# Patient Record
Sex: MALE | Race: WHITE | Employment: FULL TIME | URBAN - METROPOLITAN AREA
[De-identification: names, ages, dates, MRNs, and addresses within clinical notes are randomized per-mention and may not be internally consistent; named-entity substitution may affect disease eponyms.]

---

## 2017-01-03 ENCOUNTER — PATIENT MESSAGE (OUTPATIENT)
Dept: FAMILY MEDICINE CLINIC | Age: 52
End: 2017-01-03

## 2017-01-13 RX ORDER — LISINOPRIL 10 MG/1
TABLET ORAL
Qty: 30 TABLET | Refills: 5 | Status: SHIPPED | OUTPATIENT
Start: 2017-01-13 | End: 2017-02-13

## 2017-01-26 ENCOUNTER — PATIENT MESSAGE (OUTPATIENT)
Dept: FAMILY MEDICINE CLINIC | Age: 52
End: 2017-01-26

## 2017-02-13 ENCOUNTER — OFFICE VISIT (OUTPATIENT)
Dept: FAMILY MEDICINE CLINIC | Age: 52
End: 2017-02-13

## 2017-02-13 VITALS
BODY MASS INDEX: 33.28 KG/M2 | OXYGEN SATURATION: 98 % | RESPIRATION RATE: 16 BRPM | DIASTOLIC BLOOD PRESSURE: 80 MMHG | HEART RATE: 87 BPM | SYSTOLIC BLOOD PRESSURE: 124 MMHG | WEIGHT: 238.6 LBS

## 2017-02-13 DIAGNOSIS — T46.4X5A COUGH DUE TO ACE INHIBITOR: ICD-10-CM

## 2017-02-13 DIAGNOSIS — E11.9 TYPE 2 DIABETES MELLITUS WITHOUT COMPLICATION, WITHOUT LONG-TERM CURRENT USE OF INSULIN (HCC): Primary | ICD-10-CM

## 2017-02-13 DIAGNOSIS — R05.8 COUGH DUE TO ACE INHIBITOR: ICD-10-CM

## 2017-02-13 DIAGNOSIS — E78.1 HYPERTRIGLYCERIDEMIA: ICD-10-CM

## 2017-02-13 DIAGNOSIS — E66.9 OBESITY (BMI 30-39.9): ICD-10-CM

## 2017-02-13 LAB
CREATININE URINE POCT: 50
HBA1C MFR BLD: 9 %
MICROALBUMIN/CREAT 24H UR: 10 MG/G{CREAT}

## 2017-02-13 PROCEDURE — 82044 UR ALBUMIN SEMIQUANTITATIVE: CPT | Performed by: FAMILY MEDICINE

## 2017-02-13 PROCEDURE — 99214 OFFICE O/P EST MOD 30 MIN: CPT | Performed by: FAMILY MEDICINE

## 2017-02-13 PROCEDURE — 83036 HEMOGLOBIN GLYCOSYLATED A1C: CPT | Performed by: FAMILY MEDICINE

## 2017-02-13 RX ORDER — LOSARTAN POTASSIUM 25 MG/1
25 TABLET ORAL DAILY
Qty: 30 TABLET | Refills: 5 | Status: SHIPPED | OUTPATIENT
Start: 2017-02-13 | End: 2017-08-26 | Stop reason: SDUPTHER

## 2017-03-07 ENCOUNTER — TELEPHONE (OUTPATIENT)
Dept: FAMILY MEDICINE CLINIC | Age: 52
End: 2017-03-07

## 2017-04-21 ENCOUNTER — PATIENT MESSAGE (OUTPATIENT)
Dept: FAMILY MEDICINE CLINIC | Age: 52
End: 2017-04-21

## 2017-05-12 ENCOUNTER — OFFICE VISIT (OUTPATIENT)
Dept: FAMILY MEDICINE CLINIC | Age: 52
End: 2017-05-12

## 2017-05-12 VITALS
RESPIRATION RATE: 15 BRPM | WEIGHT: 240.6 LBS | BODY MASS INDEX: 33.68 KG/M2 | HEIGHT: 71 IN | HEART RATE: 71 BPM | DIASTOLIC BLOOD PRESSURE: 82 MMHG | SYSTOLIC BLOOD PRESSURE: 128 MMHG | OXYGEN SATURATION: 96 %

## 2017-05-12 DIAGNOSIS — E11.9 TYPE 2 DIABETES MELLITUS WITHOUT COMPLICATION, WITHOUT LONG-TERM CURRENT USE OF INSULIN (HCC): Primary | ICD-10-CM

## 2017-05-12 DIAGNOSIS — E66.9 OBESITY (BMI 30-39.9): ICD-10-CM

## 2017-05-12 DIAGNOSIS — E78.1 HYPERTRIGLYCERIDEMIA: ICD-10-CM

## 2017-05-12 DIAGNOSIS — I15.2 HYPERTENSION DUE TO ENDOCRINE DISORDER: ICD-10-CM

## 2017-05-12 LAB — HBA1C MFR BLD: 7.3 %

## 2017-05-12 PROCEDURE — 83036 HEMOGLOBIN GLYCOSYLATED A1C: CPT | Performed by: FAMILY MEDICINE

## 2017-05-12 PROCEDURE — 99214 OFFICE O/P EST MOD 30 MIN: CPT | Performed by: FAMILY MEDICINE

## 2017-05-12 RX ORDER — GLIPIZIDE 5 MG/1
5 TABLET, FILM COATED, EXTENDED RELEASE ORAL DAILY
Qty: 30 TABLET | Refills: 5 | Status: SHIPPED | OUTPATIENT
Start: 2017-05-12 | End: 2017-10-19 | Stop reason: SDUPTHER

## 2017-07-07 ENCOUNTER — PATIENT MESSAGE (OUTPATIENT)
Dept: FAMILY MEDICINE CLINIC | Age: 52
End: 2017-07-07

## 2017-07-07 DIAGNOSIS — E11.9 TYPE 2 DIABETES MELLITUS WITHOUT COMPLICATION, WITHOUT LONG-TERM CURRENT USE OF INSULIN (HCC): ICD-10-CM

## 2017-08-26 DIAGNOSIS — E11.9 TYPE 2 DIABETES MELLITUS WITHOUT COMPLICATION, WITHOUT LONG-TERM CURRENT USE OF INSULIN (HCC): ICD-10-CM

## 2017-08-26 RX ORDER — LOSARTAN POTASSIUM 25 MG/1
TABLET ORAL
Qty: 30 TABLET | Refills: 4 | Status: SHIPPED | OUTPATIENT
Start: 2017-08-26 | End: 2018-02-01 | Stop reason: SDUPTHER

## 2017-10-19 ENCOUNTER — OFFICE VISIT (OUTPATIENT)
Dept: FAMILY MEDICINE CLINIC | Age: 52
End: 2017-10-19

## 2017-10-19 ENCOUNTER — HOSPITAL ENCOUNTER (OUTPATIENT)
Dept: OTHER | Age: 52
Discharge: HOME | End: 2017-10-19
Attending: FAMILY MEDICINE | Admitting: FAMILY MEDICINE

## 2017-10-19 VITALS
RESPIRATION RATE: 14 BRPM | BODY MASS INDEX: 33.64 KG/M2 | OXYGEN SATURATION: 97 % | WEIGHT: 241.2 LBS | DIASTOLIC BLOOD PRESSURE: 78 MMHG | HEART RATE: 95 BPM | SYSTOLIC BLOOD PRESSURE: 128 MMHG

## 2017-10-19 DIAGNOSIS — E11.9 TYPE 2 DIABETES MELLITUS WITHOUT COMPLICATION, WITHOUT LONG-TERM CURRENT USE OF INSULIN (HCC): Primary | ICD-10-CM

## 2017-10-19 DIAGNOSIS — E11.9 TYPE 2 DIABETES MELLITUS WITHOUT COMPLICATION, WITHOUT LONG-TERM CURRENT USE OF INSULIN (HCC): ICD-10-CM

## 2017-10-19 DIAGNOSIS — I15.2 HYPERTENSION DUE TO ENDOCRINE DISORDER: ICD-10-CM

## 2017-10-19 DIAGNOSIS — E78.1 HYPERTRIGLYCERIDEMIA: ICD-10-CM

## 2017-10-19 DIAGNOSIS — E66.9 OBESITY (BMI 30-39.9): ICD-10-CM

## 2017-10-19 LAB
A/G RATIO: 1.7 (ref 1.1–2.2)
ALBUMIN SERPL-MCNC: 4.4 G/DL (ref 3.4–5)
ALP BLD-CCNC: 86 U/L (ref 40–129)
ALT SERPL-CCNC: 46 U/L (ref 10–40)
ANION GAP SERPL CALCULATED.3IONS-SCNC: 14 MMOL/L (ref 3–16)
AST SERPL-CCNC: 26 U/L (ref 15–37)
BASOPHILS ABSOLUTE: 0.1 K/UL (ref 0–0.2)
BASOPHILS RELATIVE PERCENT: 1.1 %
BILIRUB SERPL-MCNC: 0.6 MG/DL (ref 0–1)
BUN BLDV-MCNC: 12 MG/DL (ref 7–20)
CALCIUM SERPL-MCNC: 9.1 MG/DL (ref 8.3–10.6)
CHLORIDE BLD-SCNC: 100 MMOL/L (ref 99–110)
CHOLESTEROL, TOTAL: 187 MG/DL (ref 0–199)
CO2: 22 MMOL/L (ref 21–32)
CREAT SERPL-MCNC: 1 MG/DL (ref 0.9–1.3)
EOSINOPHILS ABSOLUTE: 0.2 K/UL (ref 0–0.6)
EOSINOPHILS RELATIVE PERCENT: 2.9 %
ESTIMATED AVERAGE GLUCOSE: 185.8 MG/DL
GFR AFRICAN AMERICAN: >60
GFR NON-AFRICAN AMERICAN: >60
GLOBULIN: 2.6 G/DL
GLUCOSE BLD-MCNC: 163 MG/DL (ref 70–99)
HBA1C MFR BLD: 8.1 %
HCT VFR BLD CALC: 43.3 % (ref 40.5–52.5)
HDLC SERPL-MCNC: 27 MG/DL (ref 40–60)
HEMOGLOBIN: 14.7 G/DL (ref 13.5–17.5)
LDL CHOLESTEROL CALCULATED: 114 MG/DL
LYMPHOCYTES ABSOLUTE: 1.4 K/UL (ref 1–5.1)
LYMPHOCYTES RELATIVE PERCENT: 24.4 %
MCH RBC QN AUTO: 29.3 PG (ref 26–34)
MCHC RBC AUTO-ENTMCNC: 34 G/DL (ref 31–36)
MCV RBC AUTO: 86.3 FL (ref 80–100)
MONOCYTES ABSOLUTE: 0.6 K/UL (ref 0–1.3)
MONOCYTES RELATIVE PERCENT: 11 %
NEUTROPHILS ABSOLUTE: 3.4 K/UL (ref 1.7–7.7)
NEUTROPHILS RELATIVE PERCENT: 60.6 %
PDW BLD-RTO: 13.4 % (ref 12.4–15.4)
PLATELET # BLD: 156 K/UL (ref 135–450)
PMV BLD AUTO: 10.1 FL (ref 5–10.5)
POTASSIUM SERPL-SCNC: 3.8 MMOL/L (ref 3.5–5.1)
RBC # BLD: 5.02 M/UL (ref 4.2–5.9)
SODIUM BLD-SCNC: 136 MMOL/L (ref 136–145)
TOTAL PROTEIN: 7 G/DL (ref 6.4–8.2)
TRIGL SERPL-MCNC: 228 MG/DL (ref 0–150)
VLDLC SERPL CALC-MCNC: 46 MG/DL
WBC # BLD: 5.6 K/UL (ref 4–11)

## 2017-10-19 PROCEDURE — 99214 OFFICE O/P EST MOD 30 MIN: CPT | Performed by: FAMILY MEDICINE

## 2017-10-19 RX ORDER — GLIPIZIDE 10 MG/1
10 TABLET, FILM COATED, EXTENDED RELEASE ORAL DAILY
Qty: 30 TABLET | Refills: 11 | Status: SHIPPED | OUTPATIENT
Start: 2017-10-19 | End: 2018-10-31 | Stop reason: SDUPTHER

## 2017-10-19 RX ORDER — SYRING-NEEDL,DISP,INSUL,0.3 ML 30 GX5/16"
1 SYRINGE, EMPTY DISPOSABLE MISCELLANEOUS DAILY
Qty: 1 DEVICE | Refills: 0 | Status: CANCELLED | OUTPATIENT
Start: 2017-10-19

## 2017-10-19 RX ORDER — SYRING-NEEDL,DISP,INSUL,0.3 ML 30 GX5/16"
1 SYRINGE, EMPTY DISPOSABLE MISCELLANEOUS ONCE
Qty: 100 DEVICE | Refills: 0 | Status: SHIPPED | OUTPATIENT
Start: 2017-10-19 | End: 2019-07-22 | Stop reason: ALTCHOICE

## 2017-10-19 ASSESSMENT — PATIENT HEALTH QUESTIONNAIRE - PHQ9
SUM OF ALL RESPONSES TO PHQ QUESTIONS 1-9: 0
1. LITTLE INTEREST OR PLEASURE IN DOING THINGS: 0
SUM OF ALL RESPONSES TO PHQ9 QUESTIONS 1 & 2: 0
2. FEELING DOWN, DEPRESSED OR HOPELESS: 0

## 2017-10-19 NOTE — PROGRESS NOTES
SILVER PO) Take  by mouth.  vitamin D (CHOLECALCIFEROL) 1000 UNIT TABS tablet Take 1,000 Units by mouth daily.  aspirin 81 MG tablet Take 81 mg by mouth daily. No current facility-administered medications for this visit. Health Maintenance   Topic Date Due    Diabetic retinal exam  12/08/2015    Flu vaccine (1) 02/14/2018 (Originally 9/1/2017)    Pneumococcal med risk (1 of 1 - PPSV23) 02/14/2018 (Originally [birth year]6/1983)    Colon cancer screen colonoscopy  01/23/2018    Diabetic microalbuminuria test  02/13/2018    Diabetic foot exam  05/12/2018    Diabetic hemoglobin A1C test  10/19/2018    Lipid screen  10/19/2018    DTaP/Tdap/Td vaccine (2 - Td) 12/17/2023    Hepatitis C screen  Completed    HIV screen  Excluded       I have reviewed the patient's medical history in detail and updated the computerized patient record as appropriate. ROS:  Gen:  Denies fever, chills, headaches. HEENT:  Denies cold symptoms, sore throat. CV:  Denies chest pain or tightness, palpitations. Pulm:  Denies shortness of breath, cough. Abd:  Denies abdominal pain, change in bowel habits. I have reviewed the patient's medical history in detail and updated the computerized patient record as appropriate. OBJECTIVE:  /78 (Site: Left Arm, Position: Sitting, Cuff Size: Large Adult)   Pulse 95   Resp 14   Wt 241 lb 3.2 oz (109.4 kg)   SpO2 97%   BMI 33.64 kg/m²   GEN:  WDWN, NAD, obese  HEENT:  NCAT, TM/OP nl, PERRL, EOMI. NECK:  Supple without adenopathy. CV:  Regular rate and rhythm, S1 and S2 normal, no murmurs, clicks, gallops or rubs. No edema. PULM:  Chest is clear, no wheezing or rales. Normal symmetric air entry throughout both lung fields. ABD: soft, Non-tender, non-distended, normal bowel sounds. No organomegaly     ASSESSMENT:  1. Type 2 diabetes mellitus without complication, without long-term current use of insulin ([facility] [address]    2. Hypertension due to endocrine disorder    3. Hypertriglyceridemia    4. Obesity (BMI 30-39. 9)        PLAN:  1.  DM control worse. Increase glipizide to 10mg daily. Continue janumet at current dose. Discussed diet/exercise/wt loss goals. Goal to lose 50lbs in next year. Pt handouts given  2. BP stable. On ARB. Renal fxn stable  3. Continue statin daily  4. Continue ASA 81mg daily  Health maintenance reviewed and updated today.

## 2017-10-30 DIAGNOSIS — E78.2 ELEVATED TRIGLYCERIDES WITH HIGH CHOLESTEROL: ICD-10-CM

## 2017-10-30 RX ORDER — FENOFIBRATE 160 MG/1
TABLET ORAL
Qty: 30 TABLET | Refills: 11 | Status: SHIPPED | OUTPATIENT
Start: 2017-10-30 | End: 2018-10-31 | Stop reason: SDUPTHER

## 2017-12-28 DIAGNOSIS — E11.9 TYPE 2 DIABETES MELLITUS WITHOUT COMPLICATION, WITHOUT LONG-TERM CURRENT USE OF INSULIN (HCC): ICD-10-CM

## 2017-12-28 NOTE — TELEPHONE ENCOUNTER
Last OV  10/19/2017 type 2 DM   Test strips  Last Refill 10/19/2017 #100 3 refills     One touch lancets misc  Last refill 10/19/2017 #100 3 refills

## 2017-12-28 NOTE — TELEPHONE ENCOUNTER
From: Steffanie Jacques  Sent: 12/28/2017 7:50 AM EST  Subject: Medication Renewal Request    Steffanie Georgie would like a refill of the following medications:  ONE TOUCH LANCETS MISC [Alicia Hein MD]  glucose blood VI test strips (ONE TOUCH ULTRA TEST) strip [Alicia Hein MD]    Preferred pharmacy: [address], [address] [place] [address] [phone] [facility] [phone]    Comment:

## 2018-02-01 DIAGNOSIS — E11.9 TYPE 2 DIABETES MELLITUS WITHOUT COMPLICATION, WITHOUT LONG-TERM CURRENT USE OF INSULIN (HCC): ICD-10-CM

## 2018-02-01 RX ORDER — LOSARTAN POTASSIUM 25 MG/1
TABLET ORAL
Qty: 30 TABLET | Refills: 8 | Status: SHIPPED | OUTPATIENT
Start: 2018-02-01 | End: 2018-12-06 | Stop reason: SDUPTHER

## 2018-02-01 NOTE — TELEPHONE ENCOUNTER
Medication and Quantity requested:    SITagliptin-MetFORMIN HCl ER (JANUMET XR) 100-1000 MG TB24 - qty 30      Last Visit  10/19/17 - Dr Garrett Brothers and phone number updated in EPIC:  yes

## 2018-02-01 NOTE — TELEPHONE ENCOUNTER
Last OV  10/19/2017 type 2 DM   Last Refill 08/26/2017 # 30 4 refills   Lab Results   Component Value Date     10/19/2017    K 3.8 10/19/2017     10/19/2017    CO2 22 10/19/2017    BUN 12 10/19/2017    CREATININE 1.0 10/19/2017    GLUCOSE 163 (H) 10/19/2017    CALCIUM 9.1 10/19/2017    PROT 7.0 10/19/2017    LABALBU 4.4 10/19/2017    BILITOT 0.6 10/19/2017    ALKPHOS 86 10/19/2017    AST 26 10/19/2017    ALT 46 (H) 10/19/2017    LABGLOM >60 10/19/2017    GFRAA >60 10/19/2017    AGRATIO 1.7 10/19/2017    GLOB 2.6 10/19/2017

## 2018-02-11 DIAGNOSIS — E11.9 CONTROLLED TYPE 2 DIABETES MELLITUS WITHOUT COMPLICATION, WITHOUT LONG-TERM CURRENT USE OF INSULIN (HCC): ICD-10-CM

## 2018-02-11 DIAGNOSIS — E78.1 HYPERTRIGLYCERIDEMIA: ICD-10-CM

## 2018-02-12 ENCOUNTER — OFFICE VISIT (OUTPATIENT)
Dept: FAMILY MEDICINE CLINIC | Age: 53
End: 2018-02-12

## 2018-02-12 VITALS
SYSTOLIC BLOOD PRESSURE: 130 MMHG | OXYGEN SATURATION: 98 % | DIASTOLIC BLOOD PRESSURE: 82 MMHG | RESPIRATION RATE: 16 BRPM | HEART RATE: 79 BPM | BODY MASS INDEX: 33.74 KG/M2 | HEIGHT: 71 IN | WEIGHT: 241 LBS

## 2018-02-12 DIAGNOSIS — E78.1 HYPERTRIGLYCERIDEMIA: ICD-10-CM

## 2018-02-12 DIAGNOSIS — Z12.11 SCREENING FOR COLON CANCER: ICD-10-CM

## 2018-02-12 DIAGNOSIS — E11.9 TYPE 2 DIABETES MELLITUS WITHOUT COMPLICATION, WITHOUT LONG-TERM CURRENT USE OF INSULIN (HCC): Primary | ICD-10-CM

## 2018-02-12 DIAGNOSIS — I15.2 HYPERTENSION DUE TO ENDOCRINE DISORDER: ICD-10-CM

## 2018-02-12 LAB — HBA1C MFR BLD: 7.6 %

## 2018-02-12 PROCEDURE — 99214 OFFICE O/P EST MOD 30 MIN: CPT | Performed by: FAMILY MEDICINE

## 2018-02-12 PROCEDURE — 83036 HEMOGLOBIN GLYCOSYLATED A1C: CPT | Performed by: FAMILY MEDICINE

## 2018-02-12 RX ORDER — ATORVASTATIN CALCIUM 20 MG/1
TABLET, FILM COATED ORAL
Qty: 30 TABLET | Refills: 9 | Status: SHIPPED | OUTPATIENT
Start: 2018-02-12 | End: 2019-01-11 | Stop reason: SDUPTHER

## 2018-02-12 NOTE — PROGRESS NOTES
Monitoring Suppl (ONE TOUCH ULTRA MINI) W/DEVICE KIT To test tid for uncontrolled type 2 diabetes 1 kit 0    Multiple Vitamins-Minerals (CENTRUM SILVER PO) Take  by mouth.  vitamin D (CHOLECALCIFEROL) 1000 UNIT TABS tablet Take 1,000 Units by mouth daily.  aspirin 81 MG tablet Take 81 mg by mouth daily.  Lancet Device MISC 1 Device by Does not apply route once for 1 dose 100 Device 0     No current facility-administered medications for this visit. Health Maintenance   Topic Date Due    Colon cancer screen colonoscopy  01/23/2018    Diabetic microalbuminuria test  02/13/2018    Flu vaccine (1) 02/14/2018 (Originally 9/1/2017)    Pneumococcal med risk (1 of 1 - PPSV23) 02/14/2018 (Originally 6/6[birth year]1983)    Diabetic foot exam  05/12/2018    Diabetic retinal exam  10/18/2018    A1C test (Diabetic or Prediabetic)  10/19/2018    Lipid screen  10/19/2018    Potassium monitoring  10/19/2018    Creatinine monitoring  10/19/2018    DTaP/Tdap/Td vaccine (2 - Td) 12/17/2023    Hepatitis C screen  Completed    HIV screen  Excluded       I have reviewed the patient's medical history in detail and updated the computerized patient record as appropriate. ROS:  Gen:  Denies fever, chills, headaches. HEENT:  Denies cold symptoms, sore throat. CV:  Denies chest pain or tightness, palpitations. Pulm:  Denies shortness of breath, cough. Abd:  Denies abdominal pain, change in bowel habits. I have reviewed the patient's medical history in detail and updated the computerized patient record as appropriate. OBJECTIVE:  /82 (Site: Left Arm, Position: Sitting, Cuff Size: Large Adult)   Pulse 79   Resp 16   Ht 5' 11\" (1.803 m)   Wt 241 lb (109.3 kg)   SpO2 98%   BMI 33.61 kg/m²   GEN:  WDWN, NAD, obese  HEENT:  NCAT, TM/OP nl, PERRL, EOMI. NECK:  Supple without adenopathy. CV:  Regular rate and rhythm, S1 and S2 normal, no murmurs, clicks, gallops or rubs. No edema.   PULM:  Chest is

## 2018-04-04 ENCOUNTER — TELEPHONE (OUTPATIENT)
Dept: FAMILY MEDICINE CLINIC | Age: 53
End: 2018-04-04

## 2018-04-04 DIAGNOSIS — E11.9 TYPE 2 DIABETES MELLITUS WITHOUT COMPLICATION, WITHOUT LONG-TERM CURRENT USE OF INSULIN (HCC): Primary | ICD-10-CM

## 2018-05-14 ENCOUNTER — OFFICE VISIT (OUTPATIENT)
Dept: FAMILY MEDICINE CLINIC | Age: 53
End: 2018-05-14

## 2018-05-14 VITALS
WEIGHT: 240 LBS | HEART RATE: 85 BPM | SYSTOLIC BLOOD PRESSURE: 130 MMHG | RESPIRATION RATE: 16 BRPM | DIASTOLIC BLOOD PRESSURE: 84 MMHG | BODY MASS INDEX: 33.47 KG/M2 | OXYGEN SATURATION: 97 %

## 2018-05-14 DIAGNOSIS — E78.1 HYPERTRIGLYCERIDEMIA: ICD-10-CM

## 2018-05-14 DIAGNOSIS — E11.9 TYPE 2 DIABETES MELLITUS WITHOUT COMPLICATION, WITHOUT LONG-TERM CURRENT USE OF INSULIN (HCC): Primary | ICD-10-CM

## 2018-05-14 DIAGNOSIS — Z82.49 FAM HX-ISCHEM HEART DISEASE: ICD-10-CM

## 2018-05-14 DIAGNOSIS — I15.2 HYPERTENSION DUE TO ENDOCRINE DISORDER: ICD-10-CM

## 2018-05-14 DIAGNOSIS — E66.9 OBESITY (BMI 30-39.9): ICD-10-CM

## 2018-05-14 LAB — HBA1C MFR BLD: 7 %

## 2018-05-14 PROCEDURE — 99214 OFFICE O/P EST MOD 30 MIN: CPT | Performed by: FAMILY MEDICINE

## 2018-05-14 PROCEDURE — 83036 HEMOGLOBIN GLYCOSYLATED A1C: CPT | Performed by: FAMILY MEDICINE

## 2018-08-20 RX ORDER — SITAGLIPTIN AND METFORMIN HYDROCHLORIDE 100; 1000 MG/1; MG/1
TABLET, FILM COATED, EXTENDED RELEASE ORAL
Qty: 30 TABLET | Refills: 8 | Status: SHIPPED | OUTPATIENT
Start: 2018-08-20 | End: 2019-07-08 | Stop reason: SDUPTHER

## 2018-10-31 DIAGNOSIS — E78.2 ELEVATED TRIGLYCERIDES WITH HIGH CHOLESTEROL: ICD-10-CM

## 2018-10-31 DIAGNOSIS — E11.9 TYPE 2 DIABETES MELLITUS WITHOUT COMPLICATION, WITHOUT LONG-TERM CURRENT USE OF INSULIN (HCC): ICD-10-CM

## 2018-10-31 RX ORDER — GLIPIZIDE 10 MG/1
TABLET, FILM COATED, EXTENDED RELEASE ORAL
Qty: 30 TABLET | Refills: 6 | Status: SHIPPED | OUTPATIENT
Start: 2018-10-31 | End: 2019-07-08 | Stop reason: SDUPTHER

## 2018-10-31 RX ORDER — FENOFIBRATE 160 MG/1
TABLET ORAL
Qty: 30 TABLET | Refills: 6 | Status: SHIPPED | OUTPATIENT
Start: 2018-10-31 | End: 2019-07-08 | Stop reason: SDUPTHER

## 2018-12-06 DIAGNOSIS — E11.9 TYPE 2 DIABETES MELLITUS WITHOUT COMPLICATION, WITHOUT LONG-TERM CURRENT USE OF INSULIN (HCC): ICD-10-CM

## 2018-12-06 RX ORDER — LOSARTAN POTASSIUM 25 MG/1
TABLET ORAL
Qty: 30 TABLET | Refills: 4 | Status: SHIPPED | OUTPATIENT
Start: 2018-12-06 | End: 2019-06-05 | Stop reason: SDUPTHER

## 2018-12-19 ENCOUNTER — OFFICE VISIT (OUTPATIENT)
Dept: FAMILY MEDICINE CLINIC | Age: 53
End: 2018-12-19
Payer: COMMERCIAL

## 2018-12-19 VITALS
SYSTOLIC BLOOD PRESSURE: 114 MMHG | OXYGEN SATURATION: 95 % | WEIGHT: 237 LBS | RESPIRATION RATE: 16 BRPM | HEART RATE: 96 BPM | DIASTOLIC BLOOD PRESSURE: 72 MMHG | BODY MASS INDEX: 33.05 KG/M2

## 2018-12-19 DIAGNOSIS — Z00.00 ANNUAL PHYSICAL EXAM: Primary | ICD-10-CM

## 2018-12-19 DIAGNOSIS — E55.9 VITAMIN D DEFICIENCY: ICD-10-CM

## 2018-12-19 DIAGNOSIS — Z12.5 SCREENING FOR PROSTATE CANCER: ICD-10-CM

## 2018-12-19 DIAGNOSIS — I15.2 HYPERTENSION DUE TO ENDOCRINE DISORDER: ICD-10-CM

## 2018-12-19 DIAGNOSIS — E78.1 HYPERTRIGLYCERIDEMIA: ICD-10-CM

## 2018-12-19 DIAGNOSIS — E11.9 TYPE 2 DIABETES MELLITUS WITHOUT COMPLICATION, WITHOUT LONG-TERM CURRENT USE OF INSULIN (HCC): ICD-10-CM

## 2018-12-19 LAB
CREATININE URINE POCT: 50
HBA1C MFR BLD: 6 %
MICROALBUMIN/CREAT 24H UR: 10 MG/G{CREAT}
MICROALBUMIN/CREAT UR-RTO: <30

## 2018-12-19 PROCEDURE — 99396 PREV VISIT EST AGE 40-64: CPT | Performed by: FAMILY MEDICINE

## 2018-12-19 PROCEDURE — 83036 HEMOGLOBIN GLYCOSYLATED A1C: CPT | Performed by: FAMILY MEDICINE

## 2018-12-19 PROCEDURE — 82044 UR ALBUMIN SEMIQUANTITATIVE: CPT | Performed by: FAMILY MEDICINE

## 2018-12-19 ASSESSMENT — PATIENT HEALTH QUESTIONNAIRE - PHQ9
SUM OF ALL RESPONSES TO PHQ QUESTIONS 1-9: 0
SUM OF ALL RESPONSES TO PHQ9 QUESTIONS 1 & 2: 0
2. FEELING DOWN, DEPRESSED OR HOPELESS: 0
SUM OF ALL RESPONSES TO PHQ QUESTIONS 1-9: 0
1. LITTLE INTEREST OR PLEASURE IN DOING THINGS: 0

## 2019-01-11 DIAGNOSIS — E78.1 HYPERTRIGLYCERIDEMIA: ICD-10-CM

## 2019-01-11 DIAGNOSIS — E11.9 CONTROLLED TYPE 2 DIABETES MELLITUS WITHOUT COMPLICATION, WITHOUT LONG-TERM CURRENT USE OF INSULIN (HCC): ICD-10-CM

## 2019-01-11 RX ORDER — ATORVASTATIN CALCIUM 20 MG/1
TABLET, FILM COATED ORAL
Qty: 30 TABLET | Refills: 8 | Status: SHIPPED | OUTPATIENT
Start: 2019-01-11 | End: 2020-01-03

## 2019-02-07 ENCOUNTER — HOSPITAL ENCOUNTER (OUTPATIENT)
Age: 54
Discharge: HOME | End: 2019-02-07
Payer: COMMERCIAL

## 2019-02-07 DIAGNOSIS — I15.2 HYPERTENSION DUE TO ENDOCRINE DISORDER: ICD-10-CM

## 2019-02-07 DIAGNOSIS — E11.9 TYPE 2 DIABETES MELLITUS WITHOUT COMPLICATION, WITHOUT LONG-TERM CURRENT USE OF INSULIN (HCC): ICD-10-CM

## 2019-02-07 DIAGNOSIS — E55.9 VITAMIN D DEFICIENCY: ICD-10-CM

## 2019-02-07 DIAGNOSIS — Z12.5 SCREENING FOR PROSTATE CANCER: ICD-10-CM

## 2019-02-07 DIAGNOSIS — E78.1 HYPERTRIGLYCERIDEMIA: ICD-10-CM

## 2019-02-07 LAB
A/G RATIO: 1.9 (ref 1.1–2.2)
ALBUMIN SERPL-MCNC: 4.7 G/DL (ref 3.4–5)
ALP BLD-CCNC: 74 U/L (ref 40–129)
ALT SERPL-CCNC: 36 U/L (ref 10–40)
ANION GAP SERPL CALCULATED.3IONS-SCNC: 12 MMOL/L (ref 3–16)
AST SERPL-CCNC: 21 U/L (ref 15–37)
BASOPHILS ABSOLUTE: 0.1 K/UL (ref 0–0.2)
BASOPHILS RELATIVE PERCENT: 1 %
BILIRUB SERPL-MCNC: 0.5 MG/DL (ref 0–1)
BUN BLDV-MCNC: 17 MG/DL (ref 7–20)
CALCIUM SERPL-MCNC: 10.3 MG/DL (ref 8.3–10.6)
CHLORIDE BLD-SCNC: 105 MMOL/L (ref 99–110)
CHOLESTEROL, TOTAL: 140 MG/DL (ref 0–199)
CO2: 24 MMOL/L (ref 21–32)
CREAT SERPL-MCNC: 1.2 MG/DL (ref 0.9–1.3)
EOSINOPHILS ABSOLUTE: 0.1 K/UL (ref 0–0.6)
EOSINOPHILS RELATIVE PERCENT: 1.6 %
GFR AFRICAN AMERICAN: >60
GFR NON-AFRICAN AMERICAN: >60
GLOBULIN: 2.5 G/DL
GLUCOSE BLD-MCNC: 135 MG/DL (ref 70–99)
HCT VFR BLD CALC: 45.8 % (ref 40.5–52.5)
HDLC SERPL-MCNC: 36 MG/DL (ref 40–60)
HEMOGLOBIN: 15.4 G/DL (ref 13.5–17.5)
LDL CHOLESTEROL CALCULATED: 52 MG/DL
LYMPHOCYTES ABSOLUTE: 1.2 K/UL (ref 1–5.1)
LYMPHOCYTES RELATIVE PERCENT: 22.9 %
MCH RBC QN AUTO: 28.9 PG (ref 26–34)
MCHC RBC AUTO-ENTMCNC: 33.7 G/DL (ref 31–36)
MCV RBC AUTO: 85.8 FL (ref 80–100)
MONOCYTES ABSOLUTE: 0.5 K/UL (ref 0–1.3)
MONOCYTES RELATIVE PERCENT: 10.5 %
NEUTROPHILS ABSOLUTE: 3.2 K/UL (ref 1.7–7.7)
NEUTROPHILS RELATIVE PERCENT: 64 %
PDW BLD-RTO: 13.5 % (ref 12.4–15.4)
PLATELET # BLD: 191 K/UL (ref 135–450)
PMV BLD AUTO: 9.6 FL (ref 5–10.5)
POTASSIUM SERPL-SCNC: 4.6 MMOL/L (ref 3.5–5.1)
PROSTATE SPECIFIC ANTIGEN: 0.61 NG/ML (ref 0–4)
RBC # BLD: 5.34 M/UL (ref 4.2–5.9)
SODIUM BLD-SCNC: 141 MMOL/L (ref 136–145)
TOTAL PROTEIN: 7.2 G/DL (ref 6.4–8.2)
TRIGL SERPL-MCNC: 258 MG/DL (ref 0–150)
VITAMIN D 25-HYDROXY: 42.5 NG/ML
VLDLC SERPL CALC-MCNC: 52 MG/DL
WBC # BLD: 5.1 K/UL (ref 4–11)

## 2019-02-07 PROCEDURE — 80053 COMPREHEN METABOLIC PANEL: CPT

## 2019-02-07 PROCEDURE — 36415 COLL VENOUS BLD VENIPUNCTURE: CPT

## 2019-02-07 PROCEDURE — 84153 ASSAY OF PSA TOTAL: CPT

## 2019-02-07 PROCEDURE — 85025 COMPLETE CBC W/AUTO DIFF WBC: CPT

## 2019-02-07 PROCEDURE — 82306 VITAMIN D 25 HYDROXY: CPT

## 2019-02-07 PROCEDURE — 80061 LIPID PANEL: CPT

## 2019-02-16 DIAGNOSIS — E11.9 TYPE 2 DIABETES MELLITUS WITHOUT COMPLICATION, WITHOUT LONG-TERM CURRENT USE OF INSULIN (HCC): ICD-10-CM

## 2019-02-18 RX ORDER — DAPAGLIFLOZIN 10 MG/1
TABLET, FILM COATED ORAL
Qty: 30 TABLET | Refills: 10 | Status: SHIPPED | OUTPATIENT
Start: 2019-02-18 | End: 2020-03-10

## 2019-04-16 ENCOUNTER — TELEPHONE (OUTPATIENT)
Dept: FAMILY MEDICINE CLINIC | Age: 54
End: 2019-04-16

## 2019-04-16 NOTE — TELEPHONE ENCOUNTER
Received by mail from [employer] regarding recall of Losartan.     Scanned into media to Dr. Sinan Bravo

## 2019-04-17 NOTE — TELEPHONE ENCOUNTER
Called and lvm for pt to inform him that his losartan has been recalled. He should go to return bottle to pharmacy to switch for unaffected lot number. This was stated on patients vm in detail. If he has any questions or concerns to give the office a call.

## 2019-05-21 NOTE — TELEPHONE ENCOUNTER
Called pt to advise him to call pharmacy to see if his lot # was affected. No answer. Left VM informing him the recall has been extended to other lot #s and to call his pharmacy. Advised pt to call back if he needs a med change.

## 2019-06-05 DIAGNOSIS — E11.9 TYPE 2 DIABETES MELLITUS WITHOUT COMPLICATION, WITHOUT LONG-TERM CURRENT USE OF INSULIN (HCC): ICD-10-CM

## 2019-06-05 RX ORDER — LOSARTAN POTASSIUM 25 MG/1
TABLET ORAL
Qty: 30 TABLET | Refills: 3 | Status: SHIPPED | OUTPATIENT
Start: 2019-06-05 | End: 2019-11-29 | Stop reason: SDUPTHER

## 2019-07-08 DIAGNOSIS — E11.9 TYPE 2 DIABETES MELLITUS WITHOUT COMPLICATION, WITHOUT LONG-TERM CURRENT USE OF INSULIN (HCC): ICD-10-CM

## 2019-07-08 DIAGNOSIS — E78.2 ELEVATED TRIGLYCERIDES WITH HIGH CHOLESTEROL: ICD-10-CM

## 2019-07-08 RX ORDER — FENOFIBRATE 160 MG/1
TABLET ORAL
Qty: 30 TABLET | Refills: 5 | Status: SHIPPED | OUTPATIENT
Start: 2019-07-08 | End: 2020-03-10

## 2019-07-08 RX ORDER — GLIPIZIDE 10 MG/1
TABLET, FILM COATED, EXTENDED RELEASE ORAL
Qty: 30 TABLET | Refills: 5 | Status: SHIPPED | OUTPATIENT
Start: 2019-07-08 | End: 2020-03-10

## 2019-07-08 RX ORDER — SITAGLIPTIN AND METFORMIN HYDROCHLORIDE 100; 1000 MG/1; MG/1
TABLET, FILM COATED, EXTENDED RELEASE ORAL
Qty: 30 TABLET | Refills: 5 | Status: SHIPPED | OUTPATIENT
Start: 2019-07-08 | End: 2020-03-10

## 2019-07-22 ENCOUNTER — OFFICE VISIT (OUTPATIENT)
Dept: FAMILY MEDICINE CLINIC | Age: 54
End: 2019-07-22
Payer: COMMERCIAL

## 2019-07-22 VITALS
RESPIRATION RATE: 15 BRPM | DIASTOLIC BLOOD PRESSURE: 82 MMHG | BODY MASS INDEX: 32.42 KG/M2 | OXYGEN SATURATION: 96 % | SYSTOLIC BLOOD PRESSURE: 134 MMHG | HEIGHT: 71 IN | WEIGHT: 231.6 LBS | HEART RATE: 78 BPM

## 2019-07-22 DIAGNOSIS — E11.9 TYPE 2 DIABETES MELLITUS WITHOUT COMPLICATION, WITHOUT LONG-TERM CURRENT USE OF INSULIN (HCC): Primary | ICD-10-CM

## 2019-07-22 DIAGNOSIS — I15.2 HYPERTENSION DUE TO ENDOCRINE DISORDER: ICD-10-CM

## 2019-07-22 DIAGNOSIS — Z12.11 COLON CANCER SCREENING: ICD-10-CM

## 2019-07-22 DIAGNOSIS — E78.1 HYPERTRIGLYCERIDEMIA: ICD-10-CM

## 2019-07-22 LAB — HBA1C MFR BLD: 5.8 %

## 2019-07-22 PROCEDURE — 83036 HEMOGLOBIN GLYCOSYLATED A1C: CPT | Performed by: FAMILY MEDICINE

## 2019-07-22 PROCEDURE — 99214 OFFICE O/P EST MOD 30 MIN: CPT | Performed by: FAMILY MEDICINE

## 2019-07-22 RX ORDER — LANCETS
1 EACH MISCELLANEOUS 3 TIMES DAILY
Qty: 100 EACH | Refills: 3 | Status: SHIPPED | OUTPATIENT
Start: 2019-07-22

## 2019-07-22 NOTE — PROGRESS NOTES
Yogesh Esteban is a 53 y.o. male. HPI:  Diabetes Mellitus Type II, Follow-up--   Lab Results   Component Value Date    LABA1C 5.8 07/22/2019      Checks sugars at home:Yes. postprandial range: . Last Eye Exam was over a year ago. Pt is on ACEI or ARB. Pt denies hypoglycemia , paresthesia of the feet, polydipsia, polyuria and visual disturbances. pt does adhere to a low carb diet. HTN--Pt seen here for follow up regarding HTN. BP checks at home:No    Pt denies blurred vision, chest pain, palpitations and peripheral edema. Pt complains of blurred vision, chest pain, palpitations and peripheral edema. Tolerating medications: Yes. Pt does not exercise regularly and does adhere to a low salt diet. Hyperlipidemia:    Lab Results   Component Value Date    LDLCALC 52 02/07/2019   . He does not have myalgias from medication. Pt is  following Lifestyle modification including Low fat/low cholesterol diet, low carbohydrate diet, and does not exercise regularly. Has been trying to walk more often. Current Outpatient Medications   Medication Sig Dispense Refill    JANUMET -1000 MG TB24 TAKE ONE TABLET BY MOUTH DAILY WITH BREAKFAST 30 tablet 5    glipiZIDE (GLUCOTROL XL) 10 MG extended release tablet TAKE ONE TABLET BY MOUTH DAILY 30 tablet 5    fenofibrate 160 MG tablet TAKE ONE TABLET BY MOUTH DAILY 30 tablet 5    losartan (COZAAR) 25 MG tablet TAKE ONE TABLET BY MOUTH DAILY 30 tablet 3    FARXIGA 10 MG tablet TAKE ONE TABLET BY MOUTH EVERY MORNING 30 tablet 10    atorvastatin (LIPITOR) 20 MG tablet TAKE ONE TABLET BY MOUTH ONCE NIGHTLY 30 tablet 8    Multiple Vitamins-Minerals (CENTRUM SILVER PO) Take  by mouth.  vitamin D (CHOLECALCIFEROL) 1000 UNIT TABS tablet Take 1,000 Units by mouth daily.  aspirin 81 MG tablet Take 81 mg by mouth daily. No current facility-administered medications for this visit.         Health Maintenance   Topic Date Due    Pneumococcal 0-64

## 2019-07-22 NOTE — PROGRESS NOTES
Visual inspection:  Deformity/amputation: absent  Skin lesions/pre-ulcerative calluses: absent  Edema: right- negative, left- negative    Sensory exam:  Monofilament sensation: normal  (minimum of 5 random plantar locations tested, avoiding callused areas - > 1 area with absence of sensation is + for neuropathy)    Pulses: normal,

## 2019-09-27 ENCOUNTER — TELEPHONE (OUTPATIENT)
Dept: FAMILY MEDICINE CLINIC | Age: 54
End: 2019-09-27

## 2019-11-29 DIAGNOSIS — E11.9 TYPE 2 DIABETES MELLITUS WITHOUT COMPLICATION, WITHOUT LONG-TERM CURRENT USE OF INSULIN (HCC): ICD-10-CM

## 2019-11-29 RX ORDER — LOSARTAN POTASSIUM 25 MG/1
TABLET ORAL
Qty: 30 TABLET | Refills: 2 | Status: SHIPPED | OUTPATIENT
Start: 2019-11-29 | End: 2020-03-10

## 2020-01-03 RX ORDER — ATORVASTATIN CALCIUM 20 MG/1
TABLET, FILM COATED ORAL
Qty: 90 TABLET | Refills: 1 | Status: SHIPPED | OUTPATIENT
Start: 2020-01-03 | End: 2020-08-18

## 2020-01-03 NOTE — TELEPHONE ENCOUNTER
Medication:   Requested Prescriptions     Pending Prescriptions Disp Refills    atorvastatin (LIPITOR) 20 MG tablet [Pharmacy Med Name: ATORVASTATIN 20 MG TABLET] 30 tablet 7     Sig: TAKE ONE TABLET BY MOUTH ONCE NIGHTLY       Last Filled:  1/11/19 #30, 8 RF     Patient Phone Number: [phone] (home) [phone] (work)    Last appt: 7/22/2019 DM   Next appt: 2/20/2020    Last Lipid:   Lab Results   Component Value Date    CHOL 140 02/07/2019    TRIG 258 02/07/2019    HDL 36 02/07/2019    HDL 38 02/21/2011    LDLCALC 52 02/07/2019

## 2020-02-17 NOTE — TELEPHONE ENCOUNTER
Patient is requesting an Rx for tamiflu be sent to the below pharmacy because his wife expose him to the flu:  [address] [place], [address]

## 2020-02-17 NOTE — TELEPHONE ENCOUNTER
Medication:   Requested Prescriptions     Pending Prescriptions Disp Refills    oseltamivir (TAMIFLU) 75 MG capsule 10 capsule 0     Sig: Take 1 capsule by mouth 2 times daily for 5 days            Patient Phone Number: [phone] (home) [phone] (work)    Last appt: 7/22/2019   Next appt: 2/20/2020    Last OARRS: No flowsheet data found.

## 2020-02-18 ENCOUNTER — TELEPHONE (OUTPATIENT)
Dept: FAMILY MEDICINE CLINIC | Age: 55
End: 2020-02-18

## 2020-02-18 RX ORDER — OSELTAMIVIR PHOSPHATE 75 MG/1
75 CAPSULE ORAL DAILY
Qty: 14 CAPSULE | Refills: 0 | Status: SHIPPED | OUTPATIENT
Start: 2020-02-18 | End: 2020-02-20 | Stop reason: ALTCHOICE

## 2020-02-18 NOTE — TELEPHONE ENCOUNTER
[address] [facility] [address] [facility] [address] They will fill as #14. Unable to confirm if patient has had the flu shot.

## 2020-02-18 NOTE — TELEPHONE ENCOUNTER
[address] [facility] They will fill as #14. Unable to confirm if patient has had the flu shot.

## 2020-02-18 NOTE — TELEPHONE ENCOUNTER
[address] [facility] called and stated that they are questioning the Tamiflu that was sent in today. Pharmacist stated that the Tamiflu is usually only a 10 day regimen.      Please call the pharmacy back once this message is addressed,  [phone]

## 2020-02-20 ENCOUNTER — OFFICE VISIT (OUTPATIENT)
Dept: FAMILY MEDICINE CLINIC | Age: 55
End: 2020-02-20
Payer: COMMERCIAL

## 2020-02-20 VITALS
OXYGEN SATURATION: 99 % | SYSTOLIC BLOOD PRESSURE: 130 MMHG | WEIGHT: 228.8 LBS | HEART RATE: 96 BPM | BODY MASS INDEX: 31.91 KG/M2 | DIASTOLIC BLOOD PRESSURE: 80 MMHG

## 2020-02-20 LAB
CREATININE URINE POCT: 50
HBA1C MFR BLD: 5.9 %
MICROALBUMIN/CREAT 24H UR: 10 MG/G{CREAT}
MICROALBUMIN/CREAT UR-RTO: 30

## 2020-02-20 PROCEDURE — 82044 UR ALBUMIN SEMIQUANTITATIVE: CPT | Performed by: FAMILY MEDICINE

## 2020-02-20 PROCEDURE — 83036 HEMOGLOBIN GLYCOSYLATED A1C: CPT | Performed by: FAMILY MEDICINE

## 2020-02-20 PROCEDURE — 99214 OFFICE O/P EST MOD 30 MIN: CPT | Performed by: FAMILY MEDICINE

## 2020-02-20 ASSESSMENT — PATIENT HEALTH QUESTIONNAIRE - PHQ9
SUM OF ALL RESPONSES TO PHQ QUESTIONS 1-9: 0
SUM OF ALL RESPONSES TO PHQ9 QUESTIONS 1 & 2: 0
1. LITTLE INTEREST OR PLEASURE IN DOING THINGS: 0
2. FEELING DOWN, DEPRESSED OR HOPELESS: 0
SUM OF ALL RESPONSES TO PHQ QUESTIONS 1-9: 0

## 2020-02-20 NOTE — PROGRESS NOTES
Angela Tenorio is a 53 y.o. male. HPI:  Diabetes Mellitus Type II, Follow-up--   Lab Results   Component Value Date    LABA1C 5.9 02/20/2020      Checks sugars at home:Yes. fasting range: . Last Eye Exam was over a year ago. Pt is on ACEI or ARB. Pt denies hypoglycemia , paresthesia of the feet, polydipsia, polyuria and visual disturbances. pt does adhere to a low carb diet. HTN--Pt seen here for follow up regarding HTN. BP checks at home:No   Pt denies blurred vision, chest pain, palpitations and peripheral edema. Pt complains of none. Tolerating medications: Yes. Pt does not exercise regularly and does adhere to a low salt diet. Hyperlipidemia:    Lab Results   Component Value Date    LDLCALC 52 02/07/2019   . He does not have myalgias from medication. Pt is  following Lifestyle modification including Low fat/low cholesterol diet, low carbohydrate diet, and does not exercise regularly. Current Outpatient Medications   Medication Sig Dispense Refill    atorvastatin (LIPITOR) 20 MG tablet TAKE ONE TABLET BY MOUTH ONCE NIGHTLY 90 tablet 1    losartan (COZAAR) 25 MG tablet TAKE ONE TABLET BY MOUTH DAILY 30 tablet 2    KROGER LANCETS MISC 1 each by Does not apply route 3 times daily 100 each 3    blood glucose test strips (KR[employer]ER BLOOD GLUCOSE TEST) strip 1 each by In Vitro route 3 times daily As needed. 100 each 3    JANUMET -1000 MG TB24 TAKE ONE TABLET BY MOUTH DAILY WITH BREAKFAST 30 tablet 5    glipiZIDE (GLUCOTROL XL) 10 MG extended release tablet TAKE ONE TABLET BY MOUTH DAILY 30 tablet 5    fenofibrate 160 MG tablet TAKE ONE TABLET BY MOUTH DAILY 30 tablet 5    FARXIGA 10 MG tablet TAKE ONE TABLET BY MOUTH EVERY MORNING 30 tablet 10    Multiple Vitamins-Minerals (CENTRUM SILVER PO) Take  by mouth.  vitamin D (CHOLECALCIFEROL) 1000 UNIT TABS tablet Take 1,000 Units by mouth daily.  aspirin 81 MG tablet Take 81 mg by mouth daily.        No current facility-administered medications for this visit. Health Maintenance   Topic Date Due    Diabetic retinal exam  10/18/2018    Diabetic microalbuminuria test  12/19/2019    Potassium monitoring  02/07/2020    Creatinine monitoring  02/07/2020    Lipid screen  02/07/2020    Flu vaccine (1) 06/30/2020 (Originally 9/1/2019)    Hepatitis B vaccine (1 of 3 - Risk 3-dose series) 07/22/2020 (Originally 6/6/1983)    Shingles Vaccine (1 of 2) 07/22/2020 (Originally 6/6/2014)    Pneumococcal 0-64 years Vaccine (1 of 1 - PPSV23) 07/24/2020 (Originally [birth year]/6[birth year]1970)    Diabetic foot exam  07/22/2020    A1C test (Diabetic or Prediabetic)  07/22/2020    Colon cancer screen colonoscopy  01/29/2023    DTaP/Tdap/Td vaccine (2 - Td) 12/17/2023    Hepatitis C screen  Completed    Hepatitis A vaccine  Aged Out    Hib vaccine  Aged Out    Meningococcal (ACWY) vaccine  Aged Out    HIV screen  Discontinued       I have reviewed the patient's medical/surgical/family/social in detail and updated the computerized patient record as appropriate.     Past Medical History:   Diagnosis Date    HTN (hypertension)     Lumbar disc herniation 2004, 2007    open worker's comp claim    Other and unspecified hyperlipidemia     Type II or unspecified type diabetes mellitus without mention of complication, not stated as uncontrolled 11/13     Past Surgical History:   Procedure Laterality Date    LUMBAR [place] [address] [place] SURGERY  08/2004, 02/2007    L4-L5 (twice), [place], hardware in place     Family History   Problem Relation Age of Onset    Heart Attack Father 54    Diabetes Brother      Social History     Socioeconomic History    Marital status:      Spouse name: Not on file    Number of children: Not on file    Years of education: Not on file    Highest education level: Not on file   Occupational History    Not on file   Social Needs    Financial resource strain: Not on file    Food insecurity:     Worry: Not on file Inability: Not on file    Transportation needs:     Medical: Not on file     Non-medical: Not on file   Tobacco Use    Smoking status: Never Smoker    Smokeless tobacco: Never Used   Substance and Sexual Activity    Alcohol use: Yes     Alcohol/week: 6.0 standard drinks     Types: 6 Standard drinks or equivalent per week    Drug use: No    Sexual activity: Yes     Partners: Female   Lifestyle    Physical activity:     Days per week: Not on file     Minutes per session: Not on file    Stress: Not on file   Relationships    Social connections:     Talks on phone: Not on file     Gets together: Not on file     Attends [employer] service: Not on file     Active member of club or organization: Not on file     Attends meetings of clubs or organizations: Not on file     Relationship status: Not on file    Intimate partner violence:     Fear of current or ex partner: Not on file     Emotionally abused: Not on file     Physically abused: Not on file     Forced sexual activity: Not on file   Other Topics Concern    Not on file   Social History Narrative    Not on file         ROS:  Gen:  Denies fever, chills, headaches. HEENT:  Denies cold symptoms, sore throat. CV:  Denies chest pain or tightness, palpitations. Pulm:  Denies shortness of breath, cough. Abd:  Denies abdominal pain, change in bowel habits. I have reviewed the patient's medical history in detail and updated the computerized patient record as appropriate. OBJECTIVE:  /80 (Site: Left Upper Arm, Position: Sitting, Cuff Size: Large Adult)   Pulse 96   Wt 228 lb 12.8 oz (103.8 kg)   SpO2 99%   BMI 31.91 kg/m²   GEN:  WDWN, NAD  HEENT:  NCAT, TM/OP nl, PERRL, EOMI. NECK:  Supple without adenopathy. CV:  Regular rate and rhythm, S1 and S2 normal, no murmurs, clicks, gallops or rubs. No edema. PULM:  Chest is clear, no wheezing or rales. Normal symmetric air entry throughout both lung fields.   ABD: soft, Non-tender, non-distended, normal bowel sounds. No organomegaly     ASSESSMENT/PLAN:  1. Type 2 diabetes mellitus without complication, without long-term current use of insulin (HCC)  Stable  Will d/c either [place] [facility] [place] or [place] if remains under control since not likely getting much benefit from being on both. - POCT glycosylated hemoglobin (Hb A1C)  - POCT microalbumin    2. Hypertension due to endocrine disorder  stable  - CBC Auto Differential; Future  - Comprehensive Metabolic Panel; Future  - TSH with Reflex; Future    3. Hypertriglyceridemia  Check lipids. Continue statin and ASA  - Lipid Panel; Future    4. Prostate cancer screening  - PSA, Prostatic Specific Antigen; Future      Discussed the importance of a low carb diet with regular cardiovascular exercise.

## 2020-03-10 RX ORDER — SITAGLIPTIN AND METFORMIN HYDROCHLORIDE 100; 1000 MG/1; MG/1
TABLET, FILM COATED, EXTENDED RELEASE ORAL
Qty: 30 TABLET | Refills: 5 | Status: SHIPPED | OUTPATIENT
Start: 2020-03-10 | End: 2020-11-16

## 2020-03-10 RX ORDER — LOSARTAN POTASSIUM 25 MG/1
TABLET ORAL
Qty: 30 TABLET | Refills: 5 | Status: SHIPPED | OUTPATIENT
Start: 2020-03-10 | End: 2020-11-16

## 2020-03-10 RX ORDER — DAPAGLIFLOZIN 10 MG/1
TABLET, FILM COATED ORAL
Qty: 30 TABLET | Refills: 5 | Status: SHIPPED | OUTPATIENT
Start: 2020-03-10 | End: 2020-11-16

## 2020-03-10 RX ORDER — GLIPIZIDE 10 MG/1
TABLET, FILM COATED, EXTENDED RELEASE ORAL
Qty: 30 TABLET | Refills: 5 | Status: SHIPPED | OUTPATIENT
Start: 2020-03-10 | End: 2020-11-16

## 2020-03-10 RX ORDER — FENOFIBRATE 160 MG/1
TABLET ORAL
Qty: 30 TABLET | Refills: 5 | Status: SHIPPED | OUTPATIENT
Start: 2020-03-10 | End: 2020-11-16

## 2020-03-10 NOTE — TELEPHONE ENCOUNTER
Medication:   Requested Prescriptions     Pending Prescriptions Disp Refills    losartan (COZAAR) 25 MG tablet [Pharmacy Med Name: LOSARTAN POTASSIUM 25 MG TAB] 30 tablet 1     Sig: TAKE ONE TABLET BY MOUTH DAILY    FARXIGA 10 MG tablet [Pharmacy Med Name: Param Nordmann 10 MG TABLET] 30 tablet 9     Sig: TAKE ONE TABLET BY MOUTH EVERY MORNING    fenofibrate (TRIGLIDE) 160 MG tablet [Pharmacy Med Name: FENOFIBRATE 160 MG TABLET] 30 tablet 4     Sig: TAKE ONE TABLET BY MOUTH DAILY    glipiZIDE (GLUCOTROL XL) 10 MG extended release tablet [Pharmacy Med Name: glipiZIDE XL 10 MG TABLET] 30 tablet 4     Sig: TAKE ONE TABLET BY MOUTH DAILY    JANUMET -1000 MG TB24 [Pharmacy Med Name: JANUMET -1,000 MG TABLET] 30 tablet 4     Sig: TAKE ONE TABLET BY MOUTH DAILY WITH BREAKFAST     Patient Phone Number: [phone] (home) [phone] (work)    Last appt: 2/20/2020   Next appt: 9/3/2020    Last Labs DM:   Lab Results   Component Value Date    LABA1C 5.9 02/20/2020

## 2020-08-18 RX ORDER — ATORVASTATIN CALCIUM 20 MG/1
TABLET, FILM COATED ORAL
Qty: 30 TABLET | Refills: 0 | Status: SHIPPED | OUTPATIENT
Start: 2020-08-18 | End: 2020-10-13

## 2020-10-13 RX ORDER — ATORVASTATIN CALCIUM 20 MG/1
TABLET, FILM COATED ORAL
Qty: 30 TABLET | Refills: 0 | Status: SHIPPED | OUTPATIENT
Start: 2020-10-13 | End: 2020-11-16

## 2020-11-16 RX ORDER — ATORVASTATIN CALCIUM 20 MG/1
TABLET, FILM COATED ORAL
Qty: 30 TABLET | Refills: 0 | Status: SHIPPED | OUTPATIENT
Start: 2020-11-16 | End: 2020-12-28

## 2020-11-16 RX ORDER — SITAGLIPTIN AND METFORMIN HYDROCHLORIDE 100; 1000 MG/1; MG/1
TABLET, FILM COATED, EXTENDED RELEASE ORAL
Qty: 30 TABLET | Refills: 4 | Status: SHIPPED | OUTPATIENT
Start: 2020-11-16 | End: 2021-06-09

## 2020-11-16 RX ORDER — FENOFIBRATE 160 MG/1
TABLET ORAL
Qty: 30 TABLET | Refills: 4 | Status: SHIPPED | OUTPATIENT
Start: 2020-11-16 | End: 2021-06-09

## 2020-11-16 RX ORDER — GLIPIZIDE 10 MG/1
TABLET, FILM COATED, EXTENDED RELEASE ORAL
Qty: 30 TABLET | Refills: 4 | Status: SHIPPED | OUTPATIENT
Start: 2020-11-16 | End: 2021-06-09

## 2020-11-16 RX ORDER — DAPAGLIFLOZIN 10 MG/1
TABLET, FILM COATED ORAL
Qty: 30 TABLET | Refills: 4 | Status: SHIPPED | OUTPATIENT
Start: 2020-11-16 | End: 2021-06-09

## 2020-11-16 RX ORDER — LOSARTAN POTASSIUM 25 MG/1
TABLET ORAL
Qty: 30 TABLET | Refills: 4 | Status: SHIPPED | OUTPATIENT
Start: 2020-11-16 | End: 2021-06-09

## 2020-12-07 ENCOUNTER — OFFICE VISIT (OUTPATIENT)
Dept: FAMILY MEDICINE CLINIC | Age: 55
End: 2020-12-07
Payer: COMMERCIAL

## 2020-12-07 VITALS
HEIGHT: 71 IN | DIASTOLIC BLOOD PRESSURE: 80 MMHG | HEART RATE: 97 BPM | OXYGEN SATURATION: 99 % | SYSTOLIC BLOOD PRESSURE: 126 MMHG | BODY MASS INDEX: 32.06 KG/M2 | WEIGHT: 229 LBS

## 2020-12-07 PROCEDURE — 99214 OFFICE O/P EST MOD 30 MIN: CPT | Performed by: FAMILY MEDICINE

## 2020-12-07 NOTE — PROGRESS NOTES
Hipolito Tidwell is a 63 y.o. male. HPI:  Diabetes Mellitus Type II, Follow-up--   Lab Results   Component Value Date    LABA1C 5.9 02/20/2020      Checks sugars at home:Yes. fasting range: . Last Eye Exam was over a year ago. Pt is on ACEI or ARB. Pt denies foot ulcerations, paresthesia of the feet and visual disturbances. pt does not adhere to a low carb diet. HTN--Pt seen here for follow up regarding HTN. BP checks at home:No    Pt denies blurred vision, chest pain, palpitations and peripheral edema. Pt complains of none. Tolerating medications: Yes. Pt does not exercise regularly and does not adhere to a low salt diet. Hyperlipidemia:    Lab Results   Component Value Date    LDLCALC 52 02/07/2019   . He does not have myalgias from medication. Pt is not following Lifestyle modification including Low fat/low cholesterol diet, low carbohydrate diet, and does not exercise regularly. Current Outpatient Medications   Medication Sig Dispense Refill    FARXIGA 10 MG tablet TAKE ONE TABLET BY MOUTH EVERY MORNING 30 tablet 4    losartan (COZAAR) 25 MG tablet TAKE ONE TABLET BY MOUTH DAILY 30 tablet 4    fenofibrate (TRIGLIDE) 160 MG tablet TAKE ONE TABLET BY MOUTH DAILY 30 tablet 4    glipiZIDE (GLUCOTROL XL) 10 MG extended release tablet TAKE ONE TABLET BY MOUTH DAILY 30 tablet 4    atorvastatin (LIPITOR) 20 MG tablet TAKE ONE TABLET BY MOUTH ONCE NIGHTLY 30 tablet 0    JANUMET -1000 MG TB24 TAKE ONE TABLET BY MOUTH DAILY WITH BREAKFAST 30 tablet 4    KROGER LANCETS MISC 1 each by Does not apply route 3 times daily 100 each 3    blood glucose test strips (KROGER BLOOD GLUCOSE TEST) strip 1 each by In Vitro route 3 times daily As needed. 100 each 3    Multiple Vitamins-Minerals (CENTRUM SILVER PO) Take  by mouth.  vitamin D (CHOLECALCIFEROL) 1000 UNIT TABS tablet Take 1,000 Units by mouth daily.  aspirin 81 MG tablet Take 81 mg by mouth daily.        No current without complication, without long-term current use of insulin (HCC)  - Hemoglobin A1C; Future  -  DIABETES FOOT EXAM    2. Hypertension due to endocrine disorder  Stable  - CBC Auto Differential; Future  - Comprehensive Metabolic Panel; Future    3. Hypertriglyceridemia  Check lipids  Continue statin  - Lipid Panel; Future    4. Prostate cancer screening  - PSA, Prostatic Specific Antigen; Future      Discussed the importance of a low carb diet with regular cardiovascular exercise.

## 2020-12-28 RX ORDER — ATORVASTATIN CALCIUM 20 MG/1
TABLET, FILM COATED ORAL
Qty: 30 TABLET | Refills: 0 | Status: SHIPPED | OUTPATIENT
Start: 2020-12-28 | End: 2021-01-25

## 2020-12-28 NOTE — TELEPHONE ENCOUNTER
Medication:   Requested Prescriptions     Pending Prescriptions Disp Refills    atorvastatin (LIPITOR) 20 MG tablet [Pharmacy Med Name: ATORVASTATIN 20 MG TABLET] 30 tablet 0     Sig: TAKE ONE TABLET BY MOUTH ONCE NIGHTLY        Last Filled:  11/16/2020 #30 Refills 0    Patient Phone Number: [phone] (home) [phone] (work)    Last appt: 12/7/2020    Return in about 6 months (around 6/7/2021) for DM 30 min. Next appt: 6/7/2021    Last OARRS: No flowsheet data found.

## 2021-01-25 DIAGNOSIS — E11.9 CONTROLLED TYPE 2 DIABETES MELLITUS WITHOUT COMPLICATION, WITHOUT LONG-TERM CURRENT USE OF INSULIN (HCC): ICD-10-CM

## 2021-01-25 DIAGNOSIS — E78.1 HYPERTRIGLYCERIDEMIA: ICD-10-CM

## 2021-01-25 RX ORDER — ATORVASTATIN CALCIUM 20 MG/1
TABLET, FILM COATED ORAL
Qty: 30 TABLET | Refills: 0 | Status: SHIPPED | OUTPATIENT
Start: 2021-01-25 | End: 2021-03-18

## 2021-01-25 NOTE — TELEPHONE ENCOUNTER
Medication:   Requested Prescriptions     Pending Prescriptions Disp Refills    atorvastatin (LIPITOR) 20 MG tablet [Pharmacy Med Name: ATORVASTATIN 20 MG TABLET] 30 tablet 0     Sig: TAKE ONE TABLET BY MOUTH ONCE NIGHTLY       Last Filled:      Patient Phone Number: [phone] (home) [phone] (work)    Last appt: 12/7/2020   Next appt: 6/7/2021    Last Lipid:   Lab Results   Component Value Date    CHOL 140 02/07/2019    TRIG 258 02/07/2019    HDL 36 02/07/2019    HDL 38 02/21/2011    LDLCALC 52 02/07/2019

## 2021-02-23 ENCOUNTER — PATIENT MESSAGE (OUTPATIENT)
Dept: FAMILY MEDICINE CLINIC | Age: 56
End: 2021-02-23

## 2021-02-23 DIAGNOSIS — Z12.11 COLON CANCER SCREENING: Primary | ICD-10-CM

## 2021-02-24 NOTE — TELEPHONE ENCOUNTER
From: Yessica Trinidad  To: Luana Corrales MD  Sent: 2/23/2021 4:58 PM EST  Subject: Non-Urgent Medical Question    I had a colonoscopy on 1/29/20 and received a bill from [address] [place] [address], [address] [facility]. The bill said that I needed a referral and I don't have one. Can you email me a referral so I can sent it to them?     Thanks    Yessica Trinidad

## 2021-03-18 DIAGNOSIS — E11.9 CONTROLLED TYPE 2 DIABETES MELLITUS WITHOUT COMPLICATION, WITHOUT LONG-TERM CURRENT USE OF INSULIN (HCC): ICD-10-CM

## 2021-03-18 DIAGNOSIS — E78.1 HYPERTRIGLYCERIDEMIA: ICD-10-CM

## 2021-03-18 RX ORDER — ATORVASTATIN CALCIUM 20 MG/1
TABLET, FILM COATED ORAL
Qty: 30 TABLET | Refills: 0 | Status: SHIPPED | OUTPATIENT
Start: 2021-03-18 | End: 2021-07-13

## 2021-03-18 NOTE — TELEPHONE ENCOUNTER
Medication:   Requested Prescriptions     Pending Prescriptions Disp Refills    atorvastatin (LIPITOR) 20 MG tablet [Pharmacy Med Name: ATORVASTATIN 20 MG TABLET] 30 tablet 0     Sig: TAKE ONE TABLET BY MOUTH ONCE NIGHTLY        Last Filled:  1/25/2021 30 tabs 0 refills     Patient Phone Number: [phone] (home) [phone] (work)    Last appt: 12/7/2020   Next appt: 6/7/2021    Last OARRS: No flowsheet data found.

## 2021-06-09 DIAGNOSIS — E11.9 TYPE 2 DIABETES MELLITUS WITHOUT COMPLICATION, WITHOUT LONG-TERM CURRENT USE OF INSULIN (HCC): ICD-10-CM

## 2021-06-09 DIAGNOSIS — E78.2 ELEVATED TRIGLYCERIDES WITH HIGH CHOLESTEROL: ICD-10-CM

## 2021-06-09 RX ORDER — FENOFIBRATE 160 MG/1
TABLET ORAL
Qty: 30 TABLET | Refills: 3 | Status: SHIPPED | OUTPATIENT
Start: 2021-06-09 | End: 2021-11-08

## 2021-06-09 RX ORDER — SITAGLIPTIN AND METFORMIN HYDROCHLORIDE 100; 1000 MG/1; MG/1
TABLET, FILM COATED, EXTENDED RELEASE ORAL
Qty: 30 TABLET | Refills: 3 | Status: SHIPPED | OUTPATIENT
Start: 2021-06-09 | End: 2021-11-08

## 2021-06-09 RX ORDER — LOSARTAN POTASSIUM 25 MG/1
TABLET ORAL
Qty: 30 TABLET | Refills: 3 | Status: SHIPPED | OUTPATIENT
Start: 2021-06-09 | End: 2021-11-08

## 2021-06-09 RX ORDER — GLIPIZIDE 10 MG/1
TABLET, FILM COATED, EXTENDED RELEASE ORAL
Qty: 30 TABLET | Refills: 3 | Status: SHIPPED | OUTPATIENT
Start: 2021-06-09 | End: 2021-11-08

## 2021-06-09 RX ORDER — DAPAGLIFLOZIN 10 MG/1
TABLET, FILM COATED ORAL
Qty: 30 TABLET | Refills: 3 | Status: SHIPPED | OUTPATIENT
Start: 2021-06-09 | End: 2021-11-08

## 2021-07-13 DIAGNOSIS — E78.1 HYPERTRIGLYCERIDEMIA: ICD-10-CM

## 2021-07-13 DIAGNOSIS — E11.9 CONTROLLED TYPE 2 DIABETES MELLITUS WITHOUT COMPLICATION, WITHOUT LONG-TERM CURRENT USE OF INSULIN (HCC): ICD-10-CM

## 2021-07-13 RX ORDER — ATORVASTATIN CALCIUM 20 MG/1
TABLET, FILM COATED ORAL
Qty: 30 TABLET | Refills: 0 | Status: SHIPPED | OUTPATIENT
Start: 2021-07-13 | End: 2021-08-12

## 2021-07-13 NOTE — TELEPHONE ENCOUNTER
Medication:   Requested Prescriptions     Pending Prescriptions Disp Refills    atorvastatin (LIPITOR) 20 MG tablet [Pharmacy Med Name: ATORVASTATIN 20 MG TABLET] 30 tablet 0     Sig: TAKE ONE TABLET BY MOUTH ONCE NIGHTLY        Last Filled:  3/18/21 30 tabs 0 rf    Patient Phone Number: [phone] (home) [phone] (work)    Last appt: 12/7/2020   Next appt: 8/11/2021    Last OARRS: No flowsheet data found.

## 2021-08-11 ENCOUNTER — OFFICE VISIT (OUTPATIENT)
Dept: FAMILY MEDICINE CLINIC | Age: 56
End: 2021-08-11
Payer: COMMERCIAL

## 2021-08-11 VITALS
SYSTOLIC BLOOD PRESSURE: 120 MMHG | HEART RATE: 73 BPM | OXYGEN SATURATION: 98 % | DIASTOLIC BLOOD PRESSURE: 80 MMHG | WEIGHT: 225.8 LBS | BODY MASS INDEX: 31.49 KG/M2

## 2021-08-11 DIAGNOSIS — E11.9 TYPE 2 DIABETES MELLITUS WITHOUT COMPLICATION, WITHOUT LONG-TERM CURRENT USE OF INSULIN (HCC): Primary | ICD-10-CM

## 2021-08-11 DIAGNOSIS — E78.1 HYPERTRIGLYCERIDEMIA: ICD-10-CM

## 2021-08-11 DIAGNOSIS — I15.2 HYPERTENSION DUE TO ENDOCRINE DISORDER: ICD-10-CM

## 2021-08-11 DIAGNOSIS — R51.9 ACUTE NONINTRACTABLE HEADACHE, UNSPECIFIED HEADACHE TYPE: ICD-10-CM

## 2021-08-11 PROCEDURE — 99214 OFFICE O/P EST MOD 30 MIN: CPT | Performed by: FAMILY MEDICINE

## 2021-08-11 SDOH — ECONOMIC STABILITY: FOOD INSECURITY: WITHIN THE PAST 12 MONTHS, THE FOOD YOU BOUGHT JUST DIDN'T LAST AND YOU DIDN'T HAVE MONEY TO GET MORE.: NEVER TRUE

## 2021-08-11 SDOH — ECONOMIC STABILITY: FOOD INSECURITY: WITHIN THE PAST 12 MONTHS, YOU WORRIED THAT YOUR FOOD WOULD RUN OUT BEFORE YOU GOT MONEY TO BUY MORE.: NEVER TRUE

## 2021-08-11 ASSESSMENT — SOCIAL DETERMINANTS OF HEALTH (SDOH): HOW HARD IS IT FOR YOU TO PAY FOR THE VERY BASICS LIKE FOOD, HOUSING, MEDICAL CARE, AND HEATING?: NOT HARD AT ALL

## 2021-08-11 ASSESSMENT — PATIENT HEALTH QUESTIONNAIRE - PHQ9
2. FEELING DOWN, DEPRESSED OR HOPELESS: 0
SUM OF ALL RESPONSES TO PHQ9 QUESTIONS 1 & 2: 0
SUM OF ALL RESPONSES TO PHQ QUESTIONS 1-9: 0
1. LITTLE INTEREST OR PLEASURE IN DOING THINGS: 0
SUM OF ALL RESPONSES TO PHQ QUESTIONS 1-9: 0
SUM OF ALL RESPONSES TO PHQ QUESTIONS 1-9: 0

## 2021-08-11 NOTE — PROGRESS NOTES
Caitlyn Bone is a 61 y.o. male. HPI:  Diabetes Mellitus Type II, Follow-up--   Lab Results   Component Value Date    LABA1C 6.4 08/11/2021      Checks sugars at home:Yes. trend: stable. Last Eye Exam was in the past year. Pt is on ACEI or ARB. Pt denies paresthesia of the feet and visual disturbances. pt does adhere to a low carb diet. HTN--Pt seen here for follow up regarding HTN. Pt denies blurred vision, chest pain, palpitations and peripheral edema. Pt complains of none. Tolerating medications: Yes. Pt does not exercise regularly and does adhere to a low salt diet. Hyperlipidemia:    Lab Results   Component Value Date    LDLCALC 104 (H) 08/11/2021   . He does not have myalgias from medication. Pt is  following Lifestyle modification including Low fat/low cholesterol diet, low carbohydrate diet, and does not exercise regularly.       had severe headache that came on last night across forehead. Doesn't usually get headaches. Is 5/10 since last night. Has taken ibuprofen today, not helping much but is helping some. Has only gotten a few hours of sleep the past few nights. Outside in heat putting up fence the past few days. Trying to drink a lot of water.      Current Outpatient Medications   Medication Sig Dispense Refill    atorvastatin (LIPITOR) 20 MG tablet TAKE ONE TABLET BY MOUTH ONCE NIGHTLY 30 tablet 0    JANUMET -1000 MG TB24 TAKE ONE TABLET BY MOUTH DAILY WITH BREAKFAST 30 tablet 3    losartan (COZAAR) 25 MG tablet TAKE ONE TABLET BY MOUTH DAILY 30 tablet 3    FARXIGA 10 MG tablet TAKE ONE TABLET BY MOUTH EVERY MORNING 30 tablet 3    fenofibrate (TRIGLIDE) 160 MG tablet TAKE ONE TABLET BY MOUTH DAILY 30 tablet 3    glipiZIDE (GLUCOTROL XL) 10 MG extended release tablet TAKE ONE TABLET BY MOUTH DAILY 30 tablet 3    KROGER LANCETS MISC 1 each by Does not apply route 3 times daily 100 each 3    blood glucose test strips (KROGER BLOOD GLUCOSE TEST) strip 1 each by In Vitro route 3 times daily As needed. 100 each 3    Multiple Vitamins-Minerals (CENTRUM SILVER PO) Take  by mouth.  vitamin D (CHOLECALCIFEROL) 1000 UNIT TABS tablet Take 1,000 Units by mouth daily.  aspirin 81 MG tablet Take 81 mg by mouth daily. No current facility-administered medications for this visit. Health Maintenance   Topic Date Due    Pneumococcal 0-64 years Vaccine (1 of 2 - PPSV23) Never done    COVID-19 Vaccine (1) Never done    Diabetic retinal exam  10/18/2018    Diabetic microalbuminuria test  02/20/2021    Hepatitis B vaccine (1 of 3 - Risk 3-dose series) 12/07/2021 (Originally 6/6/1984)    Shingles Vaccine (1 of 2) 12/07/2021 (Originally 6/6/2014)    Flu vaccine (1) 09/01/2021    Diabetic foot exam  12/07/2021    A1C test (Diabetic or Prediabetic)  08/11/2022    Lipid screen  08/11/2022    Potassium monitoring  08/11/2022    Creatinine monitoring  08/11/2022    Colon cancer screen colonoscopy  01/29/2023    DTaP/Tdap/Td vaccine (2 - Td or Tdap) 12/17/2023    Hepatitis C screen  Completed    Hepatitis A vaccine  Aged Out    Hib vaccine  Aged Out    Meningococcal (ACWY) vaccine  Aged Out    HIV screen  Discontinued       I have reviewed the patient's medical/surgical/family/social in detail and updated the computerized patient record as appropriate.     Past Medical History:   Diagnosis Date    HTN (hypertension)     Lumbar disc herniation 2004, 2007    open worker's comp claim    Other and unspecified hyperlipidemia     Type II or unspecified type diabetes mellitus without mention of complication, not stated as uncontrolled 11/13     Past Surgical History:   Procedure Laterality Date    LUMBAR One [address] SURGERY  08/2004, 02/2007    L4-L5 (twice), [place], hardware in place     Family History   Problem Relation Age of Onset    Heart Attack Father 54    Diabetes Brother      Social History     Socioeconomic History    Marital status:      Spouse name: Not on file    Number of children: Not on file    Years of education: Not on file    Highest education level: Not on file   Occupational History    Not on file   Tobacco Use    Smoking status: Never Smoker    Smokeless tobacco: Never Used   Substance and Sexual Activity    Alcohol use: Yes     Alcohol/week: 6.0 standard drinks     Types: 6 Standard drinks or equivalent per week    Drug use: No    Sexual activity: Yes     Partners: Female   Other Topics Concern    Not on file   Social History Narrative    Not on file     Social Determinants of Health     Financial Resource Strain: Low Risk     Difficulty of Paying Living Expenses: Not hard at all   Food Insecurity: No Food Insecurity    Worried About [address] in the Last Year: Never true    [address] [employer] in the Last Year: Never true   Transportation Needs:     Lack of Transportation (Medical):  Lack of Transportation (Non-Medical):    Physical Activity:     Days of Exercise per Week:     Minutes of Exercise per Session:    Stress:     Feeling of Stress :    Social Connections:     Frequency of Communication with Friends and Family:     Frequency of Social Gatherings with Friends and Family:     Attends Spiritism Services:     Active Member of Clubs or Organizations:     Attends Club or Organization Meetings:     Marital Status:    Intimate Partner Violence:     Fear of Current or Ex-Partner:     Emotionally Abused:     Physically Abused:     Sexually Abused:          ROS:  Gen:  Denies fever, chills, headaches. HEENT:  Denies cold symptoms, sore throat. CV:  Denies chest pain or tightness, palpitations. Pulm:  Denies shortness of breath, cough. Abd:  Denies abdominal pain, change in bowel habits. I have reviewed the patient's medical history in detail and updated the computerized patient record as appropriate.      OBJECTIVE:  /80   Pulse 73   Wt 225 lb 12.8 oz (102.4 kg)   SpO2 98%   BMI 31.49 kg/m²   GEN: WDWN, NAD  HEENT:  NCAT, TM/OP nl, PERRL, EOMI. Fundi normal  NECK:  Supple without adenopathy. CV:  Regular rate and rhythm, S1 and S2 normal, no murmurs, clicks, gallops or rubs. No edema. PULM:  Chest is clear, no wheezing or rales. Normal symmetric air entry throughout both lung fields. ABD: soft, Non-tender, non-distended, normal bowel sounds. No organomegaly   NEURO: A&O x 3. Normal speech. Normal gait. MS 5/5 in all extremities    ASSESSMENT/PLAN:  1. Type 2 diabetes mellitus without complication, without long-term current use of insulin (HCC)  Stable  Diet/exercise discussed  F/u in 6m    2. Hypertension due to endocrine disorder  stable    3. Hypertriglyceridemia  stable    4. Acute nonintractable headache, unspecified headache type  Supportive care discussed  Normal neuro exam today  F/u if persists. To ER if worsens acutely      Discussed the importance of a low carb diet with regular cardiovascular exercise. Patient was given educational handouts regarding proper low carb/low calorie diet.

## 2021-08-12 DIAGNOSIS — E78.1 HYPERTRIGLYCERIDEMIA: ICD-10-CM

## 2021-08-12 DIAGNOSIS — E11.9 CONTROLLED TYPE 2 DIABETES MELLITUS WITHOUT COMPLICATION, WITHOUT LONG-TERM CURRENT USE OF INSULIN (HCC): ICD-10-CM

## 2021-08-12 RX ORDER — ATORVASTATIN CALCIUM 20 MG/1
TABLET, FILM COATED ORAL
Qty: 30 TABLET | Refills: 0 | Status: SHIPPED | OUTPATIENT
Start: 2021-08-12 | End: 2022-01-24 | Stop reason: SDUPTHER

## 2021-08-12 NOTE — TELEPHONE ENCOUNTER
Medication:   Requested Prescriptions     Pending Prescriptions Disp Refills    atorvastatin (LIPITOR) 20 MG tablet [Pharmacy Med Name: ATORVASTATIN 20 MG TABLET] 30 tablet 0     Sig: TAKE ONE TABLET BY MOUTH ONCE NIGHTLY        Last Filled:  7/13/21 #30 R0    Patient Phone Number: [phone] (home) [phone] (work)    Last appt: 8/11/2021   Next appt: Visit date not found    Last OARRS: No flowsheet data found.

## 2021-09-22 ENCOUNTER — OFFICE VISIT (OUTPATIENT)
Dept: FAMILY MEDICINE CLINIC | Age: 56
End: 2021-09-22
Payer: COMMERCIAL

## 2021-09-22 VITALS
BODY MASS INDEX: 31.36 KG/M2 | HEART RATE: 80 BPM | OXYGEN SATURATION: 97 % | WEIGHT: 224 LBS | HEIGHT: 71 IN | DIASTOLIC BLOOD PRESSURE: 78 MMHG | SYSTOLIC BLOOD PRESSURE: 122 MMHG

## 2021-09-22 DIAGNOSIS — R10.13 EPIGASTRIC PAIN: ICD-10-CM

## 2021-09-22 DIAGNOSIS — G44.52 NEW DAILY PERSISTENT HEADACHE: Primary | ICD-10-CM

## 2021-09-22 PROCEDURE — 99214 OFFICE O/P EST MOD 30 MIN: CPT | Performed by: FAMILY MEDICINE

## 2021-09-22 RX ORDER — OMEPRAZOLE 40 MG/1
40 CAPSULE, DELAYED RELEASE ORAL
Qty: 30 CAPSULE | Refills: 0 | Status: SHIPPED | OUTPATIENT
Start: 2021-09-22 | End: 2021-10-18

## 2021-09-22 NOTE — PROGRESS NOTES
Wyatt Saavedra is a 61 y.o. male. HPI:  Had severe headache about 5 weeks ago, went away after a few days with aspirin and rest.   Now having constant headache in forehead x 2.5 weeks. Was using  on moldy wood at home. Was wearing 2 masks but still feels he inhaled the mold which caused his headaches. Feels he can still smell the mold everywhere he goes. Pain described as pulsating. No visual changes, weakness, numbness noted. Having intermittent \"aggravating\" abdominal pain. Comes on a few times per day when stomach is empty. Eating helps resolve the pain. Belching resolved pain also. No nausea/vomiting or change in bowels. Appetite has been decreased. ROS:  Gen:  Denies fever, chills, headaches. HEENT:  Denies cold symptoms, sore throat. CV:  Denies chest pain or tightness, palpitations. Pulm:  Denies shortness of breath, cough. I have reviewed the patient's medical/surgical/family/social in detail and updated the computerized patient record as appropriate. Current Outpatient Medications   Medication Sig Dispense Refill    atorvastatin (LIPITOR) 20 MG tablet TAKE ONE TABLET BY MOUTH ONCE NIGHTLY 30 tablet 0    JANUMET -1000 MG TB24 TAKE ONE TABLET BY MOUTH DAILY WITH BREAKFAST 30 tablet 3    losartan (COZAAR) 25 MG tablet TAKE ONE TABLET BY MOUTH DAILY 30 tablet 3    FARXIGA 10 MG tablet TAKE ONE TABLET BY MOUTH EVERY MORNING 30 tablet 3    fenofibrate (TRIGLIDE) 160 MG tablet TAKE ONE TABLET BY MOUTH DAILY 30 tablet 3    glipiZIDE (GLUCOTROL XL) 10 MG extended release tablet TAKE ONE TABLET BY MOUTH DAILY 30 tablet 3    KROGER LANCETS MISC 1 each by Does not apply route 3 times daily 100 each 3    blood glucose test strips (KROGER BLOOD GLUCOSE TEST) strip 1 each by In Vitro route 3 times daily As needed. 100 each 3    Multiple Vitamins-Minerals (CENTRUM SILVER PO) Take  by mouth.       vitamin D (CHOLECALCIFEROL) 1000 UNIT TABS tablet Take 1,000 Units by mouth daily.  aspirin 81 MG tablet Take 81 mg by mouth daily. No current facility-administered medications for this visit. Past Medical History:   Diagnosis Date    HTN (hypertension)     Lumbar disc herniation 2004, 2007    open worker's comp claim    Other and unspecified hyperlipidemia     Type II or unspecified type diabetes mellitus without mention of complication, not stated as uncontrolled 11/13     Past Surgical History:   Procedure Laterality Date    LUMBAR One [address] SURGERY  08/2004, 02/2007    L4-L5 (twice), [place], hardware in place     Family History   Problem Relation Age of Onset    Heart Attack Father 54    Diabetes Brother      Social History     Socioeconomic History    Marital status:      Spouse name: Not on file    Number of children: Not on file    Years of education: Not on file    Highest education level: Not on file   Occupational History    Not on file   Tobacco Use    Smoking status: Never Smoker    Smokeless tobacco: Never Used   Substance and Sexual Activity    Alcohol use: Yes     Alcohol/week: 6.0 standard drinks     Types: 6 Standard drinks or equivalent per week    Drug use: No    Sexual activity: Yes     Partners: Female   Other Topics Concern    Not on file   Social History Narrative    Not on file     Social Determinants of Health     Financial Resource Strain: Low Risk     Difficulty of Paying Living Expenses: Not hard at all   Food Insecurity: No Food Insecurity    Worried About [address] [employer] in the Last Year: Never true    [address] [facility] in the Last Year: Never true   Transportation Needs:     Lack of Transportation (Medical):      Lack of Transportation (Non-Medical):    Physical Activity:     Days of Exercise per Week:     Minutes of Exercise per Session:    Stress:     Feeling of Stress :    Social Connections:     Frequency of Communication with Friends and Family:     Frequency of Social Gatherings with Friends and Family:     Attends [employer] Services:     Active Member of Clubs or Organizations:     Attends Club or Organization Meetings:     Marital Status:    Intimate Partner Violence:     Fear of Current or Ex-Partner:     Emotionally Abused:     Physically Abused:     Sexually Abused:          OBJECTIVE:  /78 (Site: Right Upper Arm, Position: Sitting, Cuff Size: Medium Adult)   Pulse 80   Ht 5' 11\" (1.803 m)   Wt 224 lb (101.6 kg)   SpO2 97%   BMI 31.24 kg/m²   GEN:  WDWN, NAD  HEENT:  NCAT, PERRL, EOMI. CV:  Regular rate and rhythm, S1 and S2 normal, no murmurs, clicks, gallops or rubs. No edema. PULM:  Chest is clear, no wheezing or rales. Normal symmetric air entry throughout both lung fields. ABD: soft, non-tender, non-distended. Normal bowel sounds. No organomegaly   PSYCH: normal mood and affect. Intact judgement and insight  NEURO: A&O x 3. CN II-XII intact. MS 5/5. Normal gait    ASSESSMENT/PLAN:  1. New daily persistent headache  Will get MRI  - MRI BRAIN W WO CONTRAST; Future    2. Epigastric pain  Trial PPI x 14d  F/u if persists, will get EGD  - omeprazole (PRILOSEC) 40 MG delayed release capsule; Take 1 capsule by mouth every morning (before breakfast)  Dispense: 30 capsule;  Refill: 0

## 2021-10-04 ENCOUNTER — PATIENT MESSAGE (OUTPATIENT)
Dept: FAMILY MEDICINE CLINIC | Age: 56
End: 2021-10-04

## 2021-10-04 DIAGNOSIS — R10.13 EPIGASTRIC PAIN: Primary | ICD-10-CM

## 2021-10-04 NOTE — TELEPHONE ENCOUNTER
From: Frankey Charleston Meinking  To: Jean Johansen MD  Sent: 10/4/2021 1:22 PM EDT  Subject: Prescription Question    Dr. Guevara Norman,  The medicine you gave me for my stomach is not working.

## 2021-10-05 ENCOUNTER — HOSPITAL ENCOUNTER (OUTPATIENT)
Dept: MRI IMAGING | Age: 56
Discharge: HOME | End: 2021-10-05
Payer: COMMERCIAL

## 2021-10-05 DIAGNOSIS — G44.52 NEW DAILY PERSISTENT HEADACHE: ICD-10-CM

## 2021-10-05 PROCEDURE — A9579 GAD-BASE MR CONTRAST NOS,1ML: HCPCS | Performed by: FAMILY MEDICINE

## 2021-10-05 PROCEDURE — 6360000004 HC RX CONTRAST MEDICATION: Performed by: FAMILY MEDICINE

## 2021-10-05 PROCEDURE — 70553 MRI BRAIN STEM W/O & W/DYE: CPT

## 2021-10-05 RX ADMIN — GADOTERIDOL 19 ML: 279.3 INJECTION, SOLUTION INTRAVENOUS at 13:35

## 2021-10-06 ENCOUNTER — TELEPHONE (OUTPATIENT)
Dept: FAMILY MEDICINE CLINIC | Age: 56
End: 2021-10-06

## 2021-10-06 DIAGNOSIS — D18.00 CAVERNOMA: Primary | ICD-10-CM

## 2021-10-06 DIAGNOSIS — G44.52 NEW DAILY PERSISTENT HEADACHE: ICD-10-CM

## 2021-10-06 DIAGNOSIS — E23.6 RATHKE'S CLEFT CYST (HCC): ICD-10-CM

## 2021-10-06 NOTE — TELEPHONE ENCOUNTER
Called pt to discuss MRI results   All questions answered, pt to see neurosurgeon for further evaluation

## 2021-10-06 NOTE — TELEPHONE ENCOUNTER
Message read to patient along with referral information    Asked if this is life threatening and then asked if Dr. Raimundo William would call him.

## 2021-10-11 ENCOUNTER — PATIENT MESSAGE (OUTPATIENT)
Dept: FAMILY MEDICINE CLINIC | Age: 56
End: 2021-10-11

## 2021-10-11 DIAGNOSIS — R10.13 EPIGASTRIC PAIN: Primary | ICD-10-CM

## 2021-10-18 DIAGNOSIS — R10.13 EPIGASTRIC PAIN: ICD-10-CM

## 2021-10-18 RX ORDER — OMEPRAZOLE 40 MG/1
CAPSULE, DELAYED RELEASE ORAL
Qty: 30 CAPSULE | Refills: 0 | Status: SHIPPED | OUTPATIENT
Start: 2021-10-18 | End: 2022-02-14

## 2021-10-18 NOTE — TELEPHONE ENCOUNTER
Medication:   Requested Prescriptions     Pending Prescriptions Disp Refills    omeprazole (PRILOSEC) 40 MG delayed release capsule [Pharmacy Med Name: OMEPRAZOLE DR 40 MG CAPSULE] 30 capsule 0     Sig: TAKE ONE CAPSULE BY MOUTH EVERY MORNING BEFORE BREAKFAST       Last appt: 9/22/2021   Next appt: 2/14/2022    Last OARRS: No flowsheet data found.

## 2021-11-05 DIAGNOSIS — E11.9 TYPE 2 DIABETES MELLITUS WITHOUT COMPLICATION, WITHOUT LONG-TERM CURRENT USE OF INSULIN (HCC): ICD-10-CM

## 2021-11-05 DIAGNOSIS — E78.2 ELEVATED TRIGLYCERIDES WITH HIGH CHOLESTEROL: ICD-10-CM

## 2021-11-05 RX ORDER — BUTALBITAL/ACETAMINOPHEN 50MG-325MG
TABLET ORAL
COMMUNITY
Start: 2021-10-15 | End: 2022-02-14

## 2021-11-05 NOTE — TELEPHONE ENCOUNTER
Medication:   Requested Prescriptions     Pending Prescriptions Disp Refills    JANUMET -1000 MG TB24 [Pharmacy Med Name: JANUMET -1,000 MG TABLET] 30 tablet 3     Sig: TAKE ONE TABLET BY MOUTH DAILY WITH BREAKFAST    glipiZIDE (GLUCOTROL XL) 10 MG extended release tablet [Pharmacy Med Name: glipiZIDE XL 10 MG TABLET] 30 tablet 3     Sig: TAKE ONE TABLET BY MOUTH DAILY    FARXIGA 10 MG tablet [Pharmacy Med Name: Martine Craw 10 MG TABLET] 30 tablet 3     Sig: TAKE ONE TABLET BY MOUTH EVERY MORNING    losartan (COZAAR) 25 MG tablet [Pharmacy Med Name: LOSARTAN POTASSIUM 25 MG TAB] 30 tablet 3     Sig: TAKE ONE TABLET BY MOUTH DAILY    fenofibrate (TRIGLIDE) 160 MG tablet [Pharmacy Med Name: FENOFIBRATE 160 MG TABLET] 30 tablet 3     Sig: TAKE ONE TABLET BY MOUTH DAILY        Last Filled:  6/9/2021 30 tabs 3 refills     Patient Phone Number: [phone] (home) [phone] (work)    Last appt: 9/22/2021   Next appt: 2/14/2022    Last OARRS: No flowsheet data found.

## 2021-11-08 DIAGNOSIS — E11.9 TYPE 2 DIABETES MELLITUS WITHOUT COMPLICATION, WITHOUT LONG-TERM CURRENT USE OF INSULIN (HCC): ICD-10-CM

## 2021-11-08 RX ORDER — DAPAGLIFLOZIN 10 MG/1
TABLET, FILM COATED ORAL
Qty: 30 TABLET | Refills: 3 | Status: SHIPPED | OUTPATIENT
Start: 2021-11-08 | End: 2022-07-29

## 2021-11-08 RX ORDER — LOSARTAN POTASSIUM 25 MG/1
TABLET ORAL
Qty: 30 TABLET | Refills: 3 | Status: SHIPPED | OUTPATIENT
Start: 2021-11-08 | End: 2021-11-08

## 2021-11-08 RX ORDER — DAPAGLIFLOZIN 10 MG/1
TABLET, FILM COATED ORAL
Qty: 30 TABLET | Refills: 3 | Status: SHIPPED | OUTPATIENT
Start: 2021-11-08 | End: 2021-11-08

## 2021-11-08 RX ORDER — FENOFIBRATE 160 MG/1
TABLET ORAL
Qty: 30 TABLET | Refills: 3 | Status: SHIPPED | OUTPATIENT
Start: 2021-11-08 | End: 2022-03-18

## 2021-11-08 RX ORDER — GLIPIZIDE 10 MG/1
TABLET, FILM COATED, EXTENDED RELEASE ORAL
Qty: 30 TABLET | Refills: 3 | Status: SHIPPED | OUTPATIENT
Start: 2021-11-08 | End: 2022-07-29

## 2021-11-08 RX ORDER — SITAGLIPTIN AND METFORMIN HYDROCHLORIDE 100; 1000 MG/1; MG/1
TABLET, FILM COATED, EXTENDED RELEASE ORAL
Qty: 30 TABLET | Refills: 3 | Status: SHIPPED | OUTPATIENT
Start: 2021-11-08 | End: 2022-03-18

## 2021-11-08 RX ORDER — LOSARTAN POTASSIUM 25 MG/1
TABLET ORAL
Qty: 30 TABLET | Refills: 3 | Status: SHIPPED | OUTPATIENT
Start: 2021-11-08 | End: 2022-07-29

## 2021-11-08 RX ORDER — GLIPIZIDE 10 MG/1
TABLET, FILM COATED, EXTENDED RELEASE ORAL
Qty: 30 TABLET | Refills: 3 | Status: SHIPPED | OUTPATIENT
Start: 2021-11-08 | End: 2021-11-08

## 2021-11-08 NOTE — TELEPHONE ENCOUNTER
Medication:   Requested Prescriptions     Pending Prescriptions Disp Refills    glipiZIDE (GLUCOTROL XL) 10 MG extended release tablet [Pharmacy Med Name: glipiZIDE XL 10 MG TABLET] 30 tablet 3     Sig: TAKE ONE TABLET BY MOUTH DAILY    FARXIGA 10 MG tablet [Pharmacy Med Name: Guillermo Dom 10 MG TABLET] 30 tablet 3     Sig: TAKE ONE TABLET BY MOUTH EVERY MORNING    losartan (COZAAR) 25 MG tablet [Pharmacy Med Name: LOSARTAN POTASSIUM 25 MG TAB] 30 tablet 3     Sig: TAKE ONE TABLET BY MOUTH DAILY        Last Filled:     Glipizide 11/08/2021 #30 w/3 RF   Farxiga 11/08/2021 #30 w/3 RF   Losartan 11/08/2021 #30 w/3 RF    Patient Phone Number: [phone] (home) [phone] (work)    Last appt: 9/22/2021   Next appt: 2/14/2022    Last OARRS: No flowsheet data found.

## 2021-11-13 ENCOUNTER — PATIENT MESSAGE (OUTPATIENT)
Dept: FAMILY MEDICINE CLINIC | Age: 56
End: 2021-11-13

## 2021-11-13 DIAGNOSIS — E11.9 TYPE 2 DIABETES MELLITUS WITHOUT COMPLICATION, WITHOUT LONG-TERM CURRENT USE OF INSULIN (HCC): Primary | ICD-10-CM

## 2021-11-22 ENCOUNTER — TELEPHONE (OUTPATIENT)
Dept: FAMILY MEDICINE CLINIC | Age: 56
End: 2021-11-22

## 2022-01-24 DIAGNOSIS — E78.1 HYPERTRIGLYCERIDEMIA: ICD-10-CM

## 2022-01-24 DIAGNOSIS — E11.9 CONTROLLED TYPE 2 DIABETES MELLITUS WITHOUT COMPLICATION, WITHOUT LONG-TERM CURRENT USE OF INSULIN (HCC): ICD-10-CM

## 2022-01-24 RX ORDER — ATORVASTATIN CALCIUM 20 MG/1
TABLET, FILM COATED ORAL
Qty: 30 TABLET | Refills: 0 | Status: SHIPPED | OUTPATIENT
Start: 2022-01-24 | End: 2022-03-11

## 2022-01-24 NOTE — TELEPHONE ENCOUNTER
Medication:   Requested Prescriptions     Pending Prescriptions Disp Refills    atorvastatin (LIPITOR) 20 MG tablet 30 tablet 0     Sig: TAKE ONE TABLET BY MOUTH ONCE NIGHTLY        Last Filled:  8/12/2021 #30 Refills 0    Patient Phone Number: [phone] (home) [phone] (work)    Last appt: 9/22/2021   Next appt: 2/14/2022    Last OARRS: No flowsheet data found.

## 2022-02-14 ENCOUNTER — OFFICE VISIT (OUTPATIENT)
Dept: FAMILY MEDICINE CLINIC | Age: 57
End: 2022-02-14
Payer: COMMERCIAL

## 2022-02-14 VITALS
HEIGHT: 71 IN | SYSTOLIC BLOOD PRESSURE: 120 MMHG | OXYGEN SATURATION: 98 % | DIASTOLIC BLOOD PRESSURE: 78 MMHG | HEART RATE: 77 BPM | WEIGHT: 219 LBS | BODY MASS INDEX: 30.66 KG/M2

## 2022-02-14 DIAGNOSIS — E78.1 HYPERTRIGLYCERIDEMIA: ICD-10-CM

## 2022-02-14 DIAGNOSIS — R35.1 NOCTURIA: ICD-10-CM

## 2022-02-14 DIAGNOSIS — G89.29 CHRONIC INTRACTABLE HEADACHE, UNSPECIFIED HEADACHE TYPE: ICD-10-CM

## 2022-02-14 DIAGNOSIS — E23.6 RATHKE'S CLEFT CYST (HCC): ICD-10-CM

## 2022-02-14 DIAGNOSIS — E11.9 CONTROLLED TYPE 2 DIABETES MELLITUS WITHOUT COMPLICATION, WITHOUT LONG-TERM CURRENT USE OF INSULIN (HCC): Primary | ICD-10-CM

## 2022-02-14 DIAGNOSIS — I15.2 HYPERTENSION DUE TO ENDOCRINE DISORDER: ICD-10-CM

## 2022-02-14 DIAGNOSIS — I67.1 ANEURYSM OF INTRACRANIAL PORTION OF RIGHT INTERNAL CAROTID ARTERY: ICD-10-CM

## 2022-02-14 DIAGNOSIS — R51.9 CHRONIC INTRACTABLE HEADACHE, UNSPECIFIED HEADACHE TYPE: ICD-10-CM

## 2022-02-14 DIAGNOSIS — Q28.3 CEREBRAL CAVERNOMA: ICD-10-CM

## 2022-02-14 LAB — HBA1C MFR BLD: 5.2 %

## 2022-02-14 PROCEDURE — 83036 HEMOGLOBIN GLYCOSYLATED A1C: CPT | Performed by: FAMILY MEDICINE

## 2022-02-14 PROCEDURE — 99214 OFFICE O/P EST MOD 30 MIN: CPT | Performed by: FAMILY MEDICINE

## 2022-02-14 RX ORDER — GABAPENTIN 300 MG/1
CAPSULE ORAL
COMMUNITY
Start: 2022-01-07 | End: 2022-02-14

## 2022-02-14 RX ORDER — TOPIRAMATE 50 MG/1
50 TABLET, FILM COATED ORAL 2 TIMES DAILY
COMMUNITY
Start: 2022-01-20

## 2022-02-14 NOTE — PROGRESS NOTES
Valentín Sauer is a 61 y.o. male. HPI:  Diabetes Mellitus Type II, Follow-up--   Lab Results   Component Value Date    LABA1C 5.2 02/14/2022      Checks sugars at home:Yes. fasting range: 100-110s. Last Eye Exam was over a year ago. Pt is on ACEI or ARB. Pt denies hyperglycemia and hypoglycemia . pt does adhere to a low carb diet. HTN--Pt seen here for follow up regarding HTN. BP checks at home:No  Pt denies blurred vision, chest pain and palpitations. Pt complains of none. Tolerating medications: Yes. Pt does not exercise regularly and does adhere to a low salt diet. Hyperlipidemia:    Lab Results   Component Value Date    LDLCALC 104 (H) 08/11/2021   . He does not have myalgias from medication. Pt is  following Lifestyle modification including Low fat/low cholesterol diet, low carbohydrate diet, and does not exercise regularly. 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 Neurosurgery did not feel these were causing his headaches. Seeing neurology at [address] [place] [address] [id number], tried him on gabapentin but was not helping so this was stopped. Is now on topamax 50mg BID, helping some with frequent headaches. Follows up with them in a few weeks. C/o nocturia- drinks a lot of water in evenings before bed, usually has to get up to urinate 2-3x at night. Sleeps 5-6 hrs per night, is tired a lot. Starting new job next week, will be able to sleep in later in morning.      Current Outpatient Medications   Medication Sig Dispense Refill    topiramate (TOPAMAX) 50 MG tablet       atorvastatin (LIPITOR) 20 MG tablet TAKE ONE TABLET BY MOUTH ONCE NIGHTLY 30 tablet 0    JANUMET -1000 MG TB24 TAKE ONE TABLET BY MOUTH DAILY WITH BREAKFAST 30 tablet 3    fenofibrate (TRIGLIDE) 160 MG tablet TAKE ONE TABLET BY MOUTH DAILY 30 tablet 3    glipiZIDE (GLUCOTROL XL) 10 MG extended release tablet TAKE ONE TABLET BY MOUTH DAILY 30 tablet 3    FARXIGA 10 MG tablet TAKE ONE TABLET BY MOUTH EVERY MORNING 30 tablet 3    losartan (COZAAR) 25 MG tablet TAKE ONE TABLET BY MOUTH DAILY 30 tablet 3    Multiple Vitamins-Minerals (CENTRUM SILVER PO) Take  by mouth.  vitamin D (CHOLECALCIFEROL) 1000 UNIT TABS tablet Take 1,000 Units by mouth daily.  aspirin 81 MG tablet Take 81 mg by mouth daily.  gabapentin (NEURONTIN) 300 MG capsule  (Patient not taking: Reported on 2/14/2022)      ACETAMINOPHEN-BUTALBITAL  MG TABS  (Patient not taking: Reported on 2/14/2022)      omeprazole (PRILOSEC) 40 MG delayed release capsule TAKE ONE CAPSULE BY MOUTH EVERY MORNING BEFORE BREAKFAST (Patient not taking: Reported on 2/14/2022) 30 capsule 0    [employer] LANCETS MISC 1 each by Does not apply route 3 times daily 100 each 3    blood glucose test strips ([employer] BLOOD GLUCOSE TEST) strip 1 each by In Vitro route 3 times daily As needed. 100 each 3     No current facility-administered medications for this visit.        Health Maintenance   Topic Date Due    COVID-19 Vaccine (1) Never done    Pneumococcal 0-64 years Vaccine (1 of 2 - PPSV23) Never done    Hepatitis B vaccine (1 of 3 - Risk 3-dose series) Never done    Shingles Vaccine (1 of 2) Never done    Diabetic retinal exam  10/18/2018    Diabetic microalbuminuria test  02/20/2021    Diabetic foot exam  12/07/2021    Flu vaccine (1) 09/22/2022 (Originally 9/1/2021)    Lipid screen  08/11/2022    Depression Screen  08/11/2022    Potassium monitoring  08/11/2022    Creatinine monitoring  08/11/2022    Colon cancer screen colonoscopy  01/29/2023    A1C test (Diabetic or Prediabetic)  02/14/2023    DTaP/Tdap/Td vaccine (2 - Td or Tdap) 12/17/2023    Hepatitis C screen  Completed    Hepatitis A vaccine  Aged Out    Hib vaccine  Aged Out    Meningococcal (ACWY) vaccine  Aged Out    HIV screen  Discontinued       I have reviewed the patient's medical/surgical/family/social in detail and updated the computerized patient record as appropriate. Past Medical History:   Diagnosis Date    HTN (hypertension)     Lumbar disc herniation 2004, 2007    open worker's comp claim    Other and unspecified hyperlipidemia     Type II or unspecified type diabetes mellitus without mention of complication, not stated as uncontrolled 11/13     Past Surgical History:   Procedure Laterality Date    LUMBAR One [address] SURGERY  08/2004, 02/2007    L4-L5 (twice), [place], hardware in place     Family History   Problem Relation Age of Onset    Heart Attack Father 54    Diabetes Brother      Social History     Socioeconomic History    Marital status:      Spouse name: Not on file    Number of children: Not on file    Years of education: Not on file    Highest education level: Not on file   Occupational History    Not on file   Tobacco Use    Smoking status: Never Smoker    Smokeless tobacco: Never Used   Substance and Sexual Activity    Alcohol use: Yes     Alcohol/week: 6.0 standard drinks     Types: 6 Standard drinks or equivalent per week    Drug use: No    Sexual activity: Yes     Partners: Female   Other Topics Concern    Not on file   Social History Narrative    Not on file     Social Determinants of Health     Financial Resource Strain: Low Risk     Difficulty of Paying Living Expenses: Not hard at all   Food Insecurity: No Food Insecurity    Worried About [facility] in the Last Year: Never true    [facility] in the Last Year: Never true   Transportation Needs:     Lack of Transportation (Medical): Not on file    Lack of Transportation (Non-Medical):  Not on file   Physical Activity:     Days of Exercise per Week: Not on file    Minutes of Exercise per Session: Not on file   Stress:     Feeling of Stress : Not on file   Social Connections:     Frequency of Communication with Friends and Family: Not on file    Frequency of Social Gatherings with Friends and Family: Not on file   Tiara Mcclain Attends Yazidism Services: Not on file    Active Member of Clubs or Organizations: Not on file    Attends Club or Organization Meetings: Not on file    Marital Status: Not on file   Intimate Partner Violence:     Fear of Current or Ex-Partner: Not on file    Emotionally Abused: Not on file    Physically Abused: Not on file    Sexually Abused: Not on file   Housing Stability:     Unable to Pay for Housing in the Last Year: Not on file    Number of [place] in the Last Year: Not on file    Unstable Housing in the Last Year: Not on file         ROS:  Gen:  Denies fever, chills, headaches. HEENT:  Denies cold symptoms, sore throat. CV:  Denies chest pain or tightness, palpitations. Pulm:  Denies shortness of breath, cough. Abd:  Denies abdominal pain, change in bowel habits. I have reviewed the patient's medical history in detail and updated the computerized patient record as appropriate. OBJECTIVE:  /78   Pulse 77   Ht 5' 11\" (1.803 m)   Wt 219 lb (99.3 kg)   SpO2 98%   BMI 30.54 kg/m²   GEN:  WDWN, NAD  HEENT:  NCAT, PERRL, EOMI. NECK:  Supple without adenopathy. CV:  Regular rate and rhythm, S1 and S2 normal, no murmurs, clicks, gallops or rubs. No edema. PULM:  Chest is clear, no wheezing or rales. Normal symmetric air entry throughout both lung fields. ABD: soft, Non-tender, non-distended, normal bowel sounds. No organomegaly     ASSESSMENT/PLAN:  1. Controlled type 2 diabetes mellitus without complication, without long-term current use of insulin ([address]  Well controlled  Continue current medications. Follow up if hypoglycemic events occur  Follow up in 6m  - POCT glycosylated hemoglobin (Hb A1C)  - Hemoglobin A1C; Future  - MICROALBUMIN / CREATININE URINE RATIO; Future    2. Hypertension due to endocrine disorder  Stable. Continue current medications. Labs in 6m  - CBC Auto Differential; Future  - Comprehensive Metabolic Panel; Future  - TSH with Reflex; Future    3. Hypertriglyceridemia  Continue current medications. Check lipids   - Lipid Panel; Future    4. Chronic intractable headache, unspecified headache type  Continue topamax, follow up with neurology as scheduled    5. Cerebral cavernoma  Follow up with neurosurgery as scheduled    6. Rathke's cleft cyst ([address]  Follow up with neurosurgery as scheduled    7. 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 Nocturia  Decrease water intake at night  Check PSA  Follow up if persists   - PSA, Prostatic Specific Antigen; Future      Discussed the importance of a low carb diet with regular cardiovascular exercise. Patient was given educational handouts regarding proper low carb/low calorie diet.

## 2022-02-15 ENCOUNTER — OFFICE VISIT (OUTPATIENT)
Dept: ENDOCRINOLOGY | Age: 57
End: 2022-02-15
Payer: COMMERCIAL

## 2022-02-15 VITALS
DIASTOLIC BLOOD PRESSURE: 67 MMHG | HEART RATE: 88 BPM | WEIGHT: 220.8 LBS | BODY MASS INDEX: 30.91 KG/M2 | SYSTOLIC BLOOD PRESSURE: 104 MMHG | HEIGHT: 71 IN | OXYGEN SATURATION: 99 %

## 2022-02-15 DIAGNOSIS — N46.9 INFERTILITY MALE: ICD-10-CM

## 2022-02-15 DIAGNOSIS — R79.89 LOW TESTOSTERONE IN MALE: Primary | ICD-10-CM

## 2022-02-15 DIAGNOSIS — E23.6 RATHKE'S CLEFT CYST (HCC): Primary | ICD-10-CM

## 2022-02-15 DIAGNOSIS — E29.1 HYPOGONADISM MALE: ICD-10-CM

## 2022-02-15 PROCEDURE — 99204 OFFICE O/P NEW MOD 45 MIN: CPT | Performed by: INTERNAL MEDICINE

## 2022-02-15 NOTE — PROGRESS NOTES
Patient ID:   Marquez Sherwood is a 61 y.o. male    Chief Complaint:   Marquez Sherwood is seen for initial evaluation of hypogonadism. Referred by Dr. Bea Funes MD   Subjective:   Marquez Sherwood had MRI brain showing pituitary tumor in Oct 2021. There is a nonenhancing cystic lesion within the sella extending into the suprasellar cistern measuring approximately 11 x 9 x 14 mm (AP x TRANS x CC). This appears to contact the optic chiasm without definitive elevation. 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     MRI also showed somewhat lobulated T2 hyperintense lesion within the left parietal lobe with associated susceptibility. This is most compatible with a cavernoma. There is no surrounding edema. He saw Dr. Ashlee Tovar. He is been told to have small aneurysm. VF normal - Dec 2021     TT [facility], [facility] low normal - Oct 2021   IGF slightly low at 44, ACTH, prolactin, Am cortisol, TSH, T4, Free T3 - Oct 2021     He has headaches started Sep 2021.   Libido 3/10, perfomance is low due to ED, Energy levels are low   Denies morning erections  No issues in concentration    Sleeping well   Shaves once or twice a week   Denies muscle weakness   Denies depression or anxiety   Denies nipple discharge or gynecomastia     Denies head traumas   Fathered two children, doing well   Does not smoke     Denies drug abuse, opioids, steroids     DM, HTN and metabolic diseases as per pcp     The following portions of the patient's history were reviewed and updated as appropriate:        Family History   Problem Relation Age of Onset    Heart Attack Father 54    Diabetes Brother     Cancer Paternal Grandmother          Social History     Socioeconomic History    Marital status:      Spouse name: Not on file    Number of children: Not on file    Years of education: Not on file    Highest education level: Not on file   Occupational History    Not on file   Tobacco Use    Smoking status: Never Smoker    Smokeless tobacco: Never Used   Vaping Use    Vaping Use: Never used   Substance and Sexual Activity    Alcohol use: Not Currently     Alcohol/week: 6.0 standard drinks     Types: 6 Standard drinks or equivalent per week    Drug use: No    Sexual activity: Yes     Partners: Female   Other Topics Concern    Not on file   Social History Narrative    Not on file     Social Determinants of Health     Financial Resource Strain: Low Risk     Difficulty of Paying Living Expenses: Not hard at all   Food Insecurity: No Food Insecurity    Worried About Running Out of Food in the Last Year: Never true    Joseph of Food in the Last Year: Never true   Transportation Needs:     Lack of Transportation (Medical): Not on file    Lack of Transportation (Non-Medical):  Not on file   Physical Activity:     Days of Exercise per Week: Not on file    Minutes of Exercise per Session: Not on file   Stress:     Feeling of Stress : Not on file   Social Connections:     Frequency of Communication with Friends and Family: Not on file    Frequency of Social Gatherings with Friends and Family: Not on file    Attends Zoroastrianism Services: Not on file    Active Member of [address] [place] [address] or Organizations: Not on file    Attends Club or Organization Meetings: Not on file    Marital Status: Not on file   Intimate Partner Violence:     Fear of Current or Ex-Partner: Not on file    Emotionally Abused: Not on file    Physically Abused: Not on file    Sexually Abused: Not on file   Housing Stability:     Unable to Pay for Housing in the Last Year: Not on file    Number of [place] in the Last Year: Not on file    Unstable Housing in the Last Year: Not on file         Past Medical History:   Diagnosis Date    HTN (hypertension)     Lumbar disc herniation 2004, 2007    open worker's comp claim    Other and unspecified hyperlipidemia     Type II or unspecified type diabetes mellitus without mention of complication, not stated as uncontrolled 11/13         Past Surgical History:   Procedure Laterality Date    LUMBAR One [address] SURGERY  08/2004, 02/2007    L4-L5 (twice), [place], hardware in place         Allergies   Allergen Reactions    Lisinopril      Dry cough         Current Outpatient Medications:     clomiPHENE (CLOMID) 50 MG tablet, 25 mg three days (Monday, Wednesday and Friday), Disp: 12 tablet, Rfl: 3    topiramate (TOPAMAX) 50 MG tablet, Take 50 mg by mouth 2 times daily , Disp: , Rfl:     atorvastatin (LIPITOR) 20 MG tablet, TAKE ONE TABLET BY MOUTH ONCE NIGHTLY, Disp: 30 tablet, Rfl: 0    JANUMET -1000 MG TB24, TAKE ONE TABLET BY MOUTH DAILY WITH BREAKFAST, Disp: 30 tablet, Rfl: 3    fenofibrate (TRIGLIDE) 160 MG tablet, TAKE ONE TABLET BY MOUTH DAILY, Disp: 30 tablet, Rfl: 3    glipiZIDE (GLUCOTROL XL) 10 MG extended release tablet, TAKE ONE TABLET BY MOUTH DAILY, Disp: 30 tablet, Rfl: 3    FARXIGA 10 MG tablet, TAKE ONE TABLET BY MOUTH EVERY MORNING, Disp: 30 tablet, Rfl: 3    losartan (COZAAR) 25 MG tablet, TAKE ONE TABLET BY MOUTH DAILY, Disp: 30 tablet, Rfl: 3    KROGER LANCETS MISC, 1 each by Does not apply route 3 times daily, Disp: 100 each, Rfl: 3    blood glucose test strips ([employer]ER BLOOD GLUCOSE TEST) strip, 1 each by In Vitro route 3 times daily As needed. , Disp: 100 each, Rfl: 3    Multiple Vitamins-Minerals (CENTRUM SILVER PO), Take  by mouth., Disp: , Rfl:     vitamin D (CHOLECALCIFEROL) 1000 UNIT TABS tablet, Take 1,000 Units by mouth daily. , Disp: , Rfl:     aspirin 81 MG tablet, Take 81 mg by mouth daily. , Disp: , Rfl:       Review of Systems:    Constitutional: Negative for fever, chills, and unexpected weight change. HENT: Negative for congestion, ear pain, rhinorrhea,  sore throat and trouble swallowing. Eyes: Negative for photophobia, redness, itching. Respiratory: Negative for cough, shortness of breath and sputum.    Cardiovascular: Negative for chest pain, palpitations and leg swelling. Gastrointestinal: Negative for nausea, vomiting, abdominal pain, diarrhea, constipation. Endocrine: Negative for cold intolerance, heat intolerance, polydipsia, polyphagia and polyuria. Genitourinary: Negative for dysuria, urgency, frequency, hematuria and flank pain. Musculoskeletal: Negative for myalgias, back pain, arthralgias and neck pain. Skin/Nail: Negative for rash, itching. Normal nails. Neurological: Negative for seizures, weakness, light-headedness, numbness and headaches. Hematological/ Lymph nodes: Negative for adenopathy. Does not bruise/bleed easily. Psychiatric/Behavioral: Negative for suicidal ideas. Objective:   Physical Exam:    /67 (Site: Left Upper Arm, Position: Sitting, Cuff Size: Large Adult)   Pulse 88   Ht 5' 11\" (1.803 m)   Wt 220 lb 12.8 oz (100.2 kg)   SpO2 99%   BMI 30.80 kg/m²       Constitutional: Patient is oriented to person, place, and time. Patient appears well-developed and well-nourished. HENT:    Head: Normocephalic and atraumatic. Eyes: Conjunctivae and EOM are normal.     Neck: Normal range of motion. Thyroid normal.   Cardiovascular: Normal rate, regular rhythm and normal heart sounds. Pulmonary/Chest: Effort normal and breath sounds normal.   Musculoskeletal: Normal range of motion. Neurological: Patient is alert and oriented to person, place, and time. Patient has normal reflexes. Skin: Skin is warm and dry. Psychiatric: Patient has a normal mood and affect.  Patient behavior is normal.     Lab Review:      Office Visit on 02/14/2022   Component Date Value Ref Range Status    Hemoglobin A1C 02/14/2022 5.2  % Final   Orders Only on 10/19/2021   Component Date Value Ref Range Status    Growth Hormone 10/19/2021 0.16  0.05 - 3.00 ng/mL Final   Orders Only on 10/19/2021   Component Date Value Ref Range Status    IGF-1 (INSULIN-LIKE GROWTH I) 10/19/2021 44* 58 - 204 ng/mL Final    Insulin-Like GF-1 Z-Score 10/19/2021 -3.2   Final   Orders Only on 10/19/2021   Component Date Value Ref Range Status    ACTH 10/19/2021 16  7 - 69 pg/mL Final   Orders Only on 10/19/2021   Component Date Value Ref Range Status    Testosterone 10/19/2021 21* 220 - 1,000 ng/dL Final   Orders Only on 10/19/2021   Component Date Value Ref Range Status    Prolactin 10/19/2021 9.7  ng/mL Final   Orders Only on 10/19/2021   Component Date Value Ref Range Status    LH 10/19/2021 1.2  mIU/mL Final   Orders Only on 10/19/2021   Component Date Value Ref Range Status    [facility] 10/19/2021 3.3  mIU/mL Final   Orders Only on 10/19/2021   Component Date Value Ref Range Status    Cortisol - AM 10/19/2021 13.5  4.3 - 22.4 ug/dL Final   Orders Only on 10/19/2021   Component Date Value Ref Range Status    TSH 10/19/2021 1.44  0.27 - 4.20 uIU/mL Final   There may be more visits with results that are not included. Controlled substances monitoring: possible medication side effects, risk of tolerance and/or dependence, and alternative treatments discussed and no signs of potential drug abuse or diversion identified. Assessment and Plan       Pawan Rodriguez was seen today for consultation and hypogonadism. Diagnoses and all orders for this visit:    Rathke's cleft cyst ([facility]  -     clomiPHENE (CLOMID) 50 MG tablet; 25 mg three days (Monday, Wednesday and Friday)  -     Testosterone, free, total; Future  -     Miscellaneous Sendout 1; Future  -     Prolactin; Future    Hypogonadism male  -     clomiPHENE (CLOMID) 50 MG tablet; 25 mg three days (Monday, Wednesday and Friday)  -     Testosterone, free, total; Future  -     Miscellaneous Sendout 1; Future  -     Prolactin; Future    Infertility male  -     clomiPHENE (CLOMID) 50 MG tablet; 25 mg three days (Monday, Wednesday and Friday)  -     Testosterone, free, total; Future  -     Miscellaneous Sendout 1; Future  -     Prolactin;  Future            1: 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 Rx sent   Patient is aware, clomid may not work     The FDA is updating the labels for all prescription testosterone products to warn of their potential for abuse and dependence. Adverse effects linked to testosterone abuse include myocardial infarction, heart failure, stroke, male infertility, depression,  Aggression,  blood clots, prostate hyperplasia/cancer, sleep apnea, acne, and breast enlargement. Men who use a testosterone gel should wash their hands thoroughly after applying a dose and make sure that no one else touches the spots where they medicate. If a woman or child comes into contact with testosterone gels, it can cause side effects in them, including hair growth and premature puberty. Testosterone abuse often occurs with other anabolic androgenic steroids and at higher doses than what is prescribed. Withdrawal symptoms have included fatigue, irritability, loss of appetite, reduced libido, and insomnia.     4: ED   If it continues after nomral T levels then he will need a Urology evaluation     RTC in 3 months       Electronically signed by Holly Marquez MD on 2/15/2022 at 2:30 PM

## 2022-03-11 DIAGNOSIS — E78.1 HYPERTRIGLYCERIDEMIA: ICD-10-CM

## 2022-03-11 DIAGNOSIS — E11.9 CONTROLLED TYPE 2 DIABETES MELLITUS WITHOUT COMPLICATION, WITHOUT LONG-TERM CURRENT USE OF INSULIN (HCC): ICD-10-CM

## 2022-03-11 RX ORDER — ATORVASTATIN CALCIUM 20 MG/1
TABLET, FILM COATED ORAL
Qty: 90 TABLET | Refills: 3 | Status: SHIPPED | OUTPATIENT
Start: 2022-03-11

## 2022-03-11 NOTE — TELEPHONE ENCOUNTER
Medication:   Requested Prescriptions     Pending Prescriptions Disp Refills    atorvastatin (LIPITOR) 20 MG tablet [Pharmacy Med Name: ATORVASTATIN 20 MG TABLET] 30 tablet 0     Sig: TAKE ONE TABLET BY MOUTH ONCE NIGHTLY        Last Filled:  1/24/2022 30 tabs 0 refills     Patient Phone Number: [phone] (home) [phone] (work)    Last appt: 2/14/2022   Next appt: 8/15/2022    Last OARRS: No flowsheet data found.

## 2022-03-18 DIAGNOSIS — E78.2 ELEVATED TRIGLYCERIDES WITH HIGH CHOLESTEROL: ICD-10-CM

## 2022-03-18 RX ORDER — FENOFIBRATE 160 MG/1
TABLET ORAL
Qty: 90 TABLET | Refills: 3 | Status: SHIPPED | OUTPATIENT
Start: 2022-03-18

## 2022-03-18 RX ORDER — SITAGLIPTIN AND METFORMIN HYDROCHLORIDE 100; 1000 MG/1; MG/1
TABLET, FILM COATED, EXTENDED RELEASE ORAL
Qty: 90 TABLET | Refills: 3 | Status: SHIPPED
Start: 2022-03-18 | End: 2022-08-15 | Stop reason: SINTOL

## 2022-03-18 NOTE — TELEPHONE ENCOUNTER
Last OV 2/14/2022   Next OV 8/15/2022    Requested Prescriptions     Pending Prescriptions Disp Refills    JANUMET -1000 MG TB24 [Pharmacy Med Name: JANUMET -1,000 MG TABLET] 30 tablet 3     Sig: TAKE ONE TABLET BY MOUTH DAILY WITH BREAKFAST    fenofibrate (TRIGLIDE) 160 MG tablet [Pharmacy Med Name: FENOFIBRATE 160 MG TABLET] 30 tablet 3     Sig: TAKE ONE TABLET BY MOUTH DAILY    last fill 11/8/21  pended

## 2022-05-02 ENCOUNTER — HOSPITAL ENCOUNTER (OUTPATIENT)
Age: 57
Discharge: HOME | End: 2022-05-02
Payer: COMMERCIAL

## 2022-05-02 LAB
BUN BLDV-MCNC: 20 MG/DL (ref 7–20)
CREAT SERPL-MCNC: 1.3 MG/DL (ref 0.9–1.3)
GFR AFRICAN AMERICAN: >60
GFR NON-AFRICAN AMERICAN: 57

## 2022-05-02 PROCEDURE — 82565 ASSAY OF CREATININE: CPT

## 2022-05-02 PROCEDURE — 84520 ASSAY OF UREA NITROGEN: CPT

## 2022-05-02 PROCEDURE — 36415 COLL VENOUS BLD VENIPUNCTURE: CPT

## 2022-05-19 ENCOUNTER — OFFICE VISIT (OUTPATIENT)
Dept: ENDOCRINOLOGY | Age: 57
End: 2022-05-19
Payer: COMMERCIAL

## 2022-05-19 VITALS
OXYGEN SATURATION: 98 % | DIASTOLIC BLOOD PRESSURE: 79 MMHG | HEIGHT: 71 IN | SYSTOLIC BLOOD PRESSURE: 120 MMHG | BODY MASS INDEX: 30.57 KG/M2 | HEART RATE: 76 BPM | WEIGHT: 218.4 LBS

## 2022-05-19 DIAGNOSIS — E29.1 HYPOGONADISM MALE: Primary | ICD-10-CM

## 2022-05-19 DIAGNOSIS — N46.9 INFERTILITY MALE: ICD-10-CM

## 2022-05-19 DIAGNOSIS — E23.6 RATHKE'S CLEFT CYST (HCC): ICD-10-CM

## 2022-05-19 PROCEDURE — 99214 OFFICE O/P EST MOD 30 MIN: CPT | Performed by: INTERNAL MEDICINE

## 2022-05-19 NOTE — PROGRESS NOTES
Patient ID:   Brooklynn Galaviz is a 61 y.o. male    Chief Complaint:   Brooklynn Galaviz is seen for an evaluation of  hypogonadism. Subjective:   Janntsowmya Dubonlow Anabelleted had MRI brain showing pituitary tumor in Oct 2021. There is a nonenhancing cystic lesion within the sella extending into the suprasellar cistern measuring approximately 11 x 9 x 14 mm (AP x TRANS x CC).  This appears to contact the optic chiasm without definitive elevation. 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      MRI also showed somewhat lobulated T2 hyperintense lesion within the left parietal lobe with associated susceptibility.  This is most compatible with a cavernoma.  There is no surrounding edema.      He saw Dr. Serenity Hurd. He is been told to have small aneurysm. VF normal - Dec 2021      TT 21, LH and FSH low normal - Oct 2021   IGF slightly low at 44, ACTH, prolactin, Am cortisol, TSH, T4, Free T3 - Oct 2021     Denies head traumas   Fathered two children, doing well   Does not smoke     Denies drug abuse, opioids, steroids      DM, HTN and metabolic diseases as per pcp     Interval history:      Clomid 25 mg three days (Monday, Wednesday and Friday). Ran out 2-3 weeks ago    He has headaches started Sep 2021. They are slightly better   Libido 3/10, perfomance is low due to ED. He thinks erections are okay. Wife thinks he has issues.    Energy levels are low   Denies morning erections  No issues in concentration    Sleeping well   Shaves once or twice a week   Denies muscle weakness   Denies depression or anxiety   Denies nipple discharge or gynecomastia      The following portions of the patient's history were reviewed and updated as appropriate:      Family History   Problem Relation Age of Onset    Heart Attack Father 54    Diabetes Brother     Cancer Paternal Grandmother          Social History     Socioeconomic History    Marital status:      Spouse name: Not on file    Number of children: Not on file    Years of education: Not on file    Highest education level: Not on file   Occupational History    Not on file   Tobacco Use    Smoking status: Never Smoker    Smokeless tobacco: Never Used   Vaping Use    Vaping Use: Never used   Substance and Sexual Activity    Alcohol use: Not Currently     Alcohol/week: 6.0 standard drinks     Types: 6 Standard drinks or equivalent per week    Drug use: No    Sexual activity: Yes     Partners: Female   Other Topics Concern    Not on file   Social History Narrative    Not on file     Social Determinants of Health     Financial Resource Strain: Low Risk     Difficulty of Paying Living Expenses: Not hard at all   Food Insecurity: No Food Insecurity    Worried About Running Out of Food in the Last Year: Never true    Joseph of Food in the Last Year: Never true   Transportation Needs:     Lack of Transportation (Medical): Not on file    Lack of Transportation (Non-Medical):  Not on file   Physical Activity:     Days of Exercise per Week: Not on file    Minutes of Exercise per Session: Not on file   Stress:     Feeling of Stress : Not on file   Social Connections:     Frequency of Communication with Friends and Family: Not on file    Frequency of Social Gatherings with Friends and Family: Not on file    Attends [employer] Services: Not on file    Active Member of [address] [place] [address] [employer] or Organizations: Not on file    Attends Club or Organization Meetings: Not on file    Marital Status: Not on file   Intimate Partner Violence:     Fear of Current or Ex-Partner: Not on file    Emotionally Abused: Not on file    Physically Abused: Not on file    Sexually Abused: Not on file   Housing Stability:     Unable to Pay for Housing in the Last Year: Not on file    Number of [place] in the Last Year: Not on file    Unstable Housing in the Last Year: Not on file         Past Medical History:   Diagnosis Date    HTN (hypertension)     Lumbar disc herniation 2004, 2007    open worker's comp claim    Other and unspecified hyperlipidemia     Type II or unspecified type diabetes mellitus without mention of complication, not stated as uncontrolled 11/13         Past Surgical History:   Procedure Laterality Date    LUMBAR [address] SURGERY  08/2004, 02/2007    L4-L5 (twice), [place], hardware in place         Allergies   Allergen Reactions    Lisinopril      Dry cough         Current Outpatient Medications:     clomiPHENE (CLOMID) 50 MG tablet, 50 mg three days a week (Monday, Wednesday and Friday), Disp: 12 tablet, Rfl: 1    JANUMET -1000 MG TB24, TAKE ONE TABLET BY MOUTH DAILY WITH BREAKFAST, Disp: 90 tablet, Rfl: 3    fenofibrate (TRIGLIDE) 160 MG tablet, TAKE ONE TABLET BY MOUTH DAILY, Disp: 90 tablet, Rfl: 3    atorvastatin (LIPITOR) 20 MG tablet, TAKE ONE TABLET BY MOUTH ONCE NIGHTLY, Disp: 90 tablet, Rfl: 3    topiramate (TOPAMAX) 50 MG tablet, Take 50 mg by mouth 2 times daily , Disp: , Rfl:     glipiZIDE (GLUCOTROL XL) 10 MG extended release tablet, TAKE ONE TABLET BY MOUTH DAILY, Disp: 30 tablet, Rfl: 3    FARXIGA 10 MG tablet, TAKE ONE TABLET BY MOUTH EVERY MORNING, Disp: 30 tablet, Rfl: 3    losartan (COZAAR) 25 MG tablet, TAKE ONE TABLET BY MOUTH DAILY, Disp: 30 tablet, Rfl: 3    KROGER LANCETS MISC, 1 each by Does not apply route 3 times daily, Disp: 100 each, Rfl: 3    blood glucose test strips (KROGER BLOOD GLUCOSE TEST) strip, 1 each by In Vitro route 3 times daily As needed. , Disp: 100 each, Rfl: 3    Multiple Vitamins-Minerals (CENTRUM SILVER PO), Take  by mouth., Disp: , Rfl:     vitamin D (CHOLECALCIFEROL) 1000 UNIT TABS tablet, Take 1,000 Units by mouth daily. , Disp: , Rfl:     aspirin 81 MG tablet, Take 81 mg by mouth daily. , Disp: , Rfl:     Review of Systems:    Constitutional: Negative for fever, chills, and unexpected weight change. HENT: Negative for congestion, ear pain, rhinorrhea,  sore throat and trouble swallowing.    Eyes: Negative for photophobia, redness, itching. Respiratory: Negative for cough, shortness of breath and sputum. Cardiovascular: Negative for chest pain, palpitations and leg swelling. Gastrointestinal: Negative for nausea, vomiting, abdominal pain, diarrhea, constipation. Endocrine: Negative for cold intolerance, heat intolerance, polydipsia, polyphagia and polyuria. Genitourinary: Negative for dysuria, urgency, frequency, hematuria and flank pain. Musculoskeletal: Negative for myalgias, back pain, arthralgias and neck pain. Skin/Nail: Negative for rash, itching. Normal nails. Neurological: Negative for seizures, weakness, light-headedness, numbness and headaches. Hematological/ Lymph nodes: Negative for adenopathy. Does not bruise/bleed easily. Psychiatric/Behavioral: Negative for suicidal ideas, depression, anxiety, sleep disturbance and decreased concentration. Objective:   Physical Exam:    /79 (Site: Right Upper Arm, Position: Sitting, Cuff Size: Large Adult)   Pulse 76   Ht 5' 11\" (1.803 m)   Wt 218 lb 6.4 oz (99.1 kg)   SpO2 98%   BMI 30.46 kg/m²       Constitutional: Patient is oriented to person, place, and time. Patient appears well-developed and well-nourished. HENT:    Head: Normocephalic and atraumatic. Eyes: Conjunctivae and EOM are normal. Pupils are equal, round, and reactive to light. Neck: Normal range of motion. Cardiovascular: Normal rate, regular rhythm and normal heart sounds. Pulmonary/Chest: Effort normal and breath sounds normal.   Abdominal: Soft. Bowel sounds are normal.   Musculoskeletal: Normal range of motion. Neurological: Patient is alert and oriented to person, place, and time. Patient has normal reflexes. Skin: Skin is warm and dry. Psychiatric: Patient has a normal mood and affect.  Patient behavior is normal.     Lab Review:      Hospital Outpatient Visit on 05/02/2022   Component Date Value Ref Range Status    BUN 05/02/2022 20  7 - 20 mg/dL Final    CREATININE 05/02/2022 1.3  0.9 - 1.3 mg/dL Final    GFR Non- 05/02/2022 57* >60 Final    GFR  05/02/2022 >60  >60 Final   Office Visit on 02/14/2022   Component Date Value Ref Range Status    Hemoglobin A1C 02/14/2022 5.2  % Final   Orders Only on 10/19/2021   Component Date Value Ref Range Status    Growth Hormone 10/19/2021 0.16  0.05 - 3.00 ng/mL Final   Orders Only on 10/19/2021   Component Date Value Ref Range Status    IGF-1 (INSULIN-LIKE GROWTH I) 10/19/2021 44* 58 - 204 ng/mL Final    Insulin-Like GF-1 Z-Score 10/19/2021 -3.2   Final   Orders Only on 10/19/2021   Component Date Value Ref Range Status    ACTH 10/19/2021 16  7 - 69 pg/mL Final   Orders Only on 10/19/2021   Component Date Value Ref Range Status    Testosterone 10/19/2021 21* 220 - 1,000 ng/dL Final   Orders Only on 10/19/2021   Component Date Value Ref Range Status    Prolactin 10/19/2021 9.7  ng/mL Final   Orders Only on 10/19/2021   Component Date Value Ref Range Status    LH 10/19/2021 1.2  mIU/mL Final   Orders Only on 10/19/2021   Component Date Value Ref Range Status    [address] 10/19/2021 3.3  mIU/mL Final   Orders Only on 10/19/2021   Component Date Value Ref Range Status    Cortisol - AM 10/19/2021 13.5  4.3 - 22.4 ug/dL Final   There may be more visits with results that are not included. Assessment and Plan       Irais Knight was seen today for hypogonadism.     Diagnoses and all orders for this visit:    Hypogonadism male  -     clomiPHENE (CLOMID) 50 MG tablet; 50 mg three days a week (Monday, Wednesday and Friday)  -     CBC with Auto Differential; Standing  -     Comprehensive Metabolic Panel; Standing  -     Testosterone, free, total; Standing    Infertility male  -     clomiPHENE (CLOMID) 50 MG tablet; 50 mg three days a week (Monday, Wednesday and Friday)  -     CBC with Auto Differential; Standing  -     Comprehensive Metabolic Panel; Standing  -     Testosterone, free, total; Standing    Rathke's cleft cyst (HCC)  -     clomiPHENE (CLOMID) 50 MG tablet; 50 mg three days a week (Monday, Wednesday and Friday)  -     CBC with Auto Differential; Standing  -     Comprehensive Metabolic Panel; Standing  -     Testosterone, free, total; Standing        1: 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 Johnathan Narayan     He had MRI done two days ago , it will be read on June 6th 2022     2: Male hypogonadism      LH and FSH are low normal, clomid may work Or other option is to do Testosterone replacement      He was suppose to do blood work in Feb 2022     Due for blood work      Change Clomid to 50 mg three days (Monday, Wednesday and Friday). Rx sent   Patient is aware, clomid may not work      The FDA is updating the labels for all prescription testosterone products to warn of their potential for abuse and dependence. Adverse effects linked to testosterone abuse include myocardial infarction, heart failure, stroke, male infertility, depression,  Aggression,  blood clots, prostate hyperplasia/cancer, sleep apnea, acne, and breast enlargement. Men who use a testosterone gel should wash their hands thoroughly after applying a dose and make sure that no one else touches the spots where they medicate. If a woman or child comes into contact with testosterone gels, it can cause side effects in them, including hair growth and premature puberty.     Testosterone abuse often occurs with other anabolic androgenic steroids and at higher doses than what is prescribed.  Withdrawal symptoms have included fatigue, irritability, loss of appetite, reduced libido, and insomnia.     4: ED   If it continues after nomral T levels then he will need a Urology evaluation      CBC, CMP, TT tomorrow (off clomid for 2 weeks)     RTC in 2 months with CBC, CMP, TT       Electronically signed by Gregg Becerril MD on 5/19/2022 at 4:56 PM

## 2022-05-23 ENCOUNTER — HOSPITAL ENCOUNTER (OUTPATIENT)
Age: 57
Discharge: HOME | End: 2022-05-23
Payer: COMMERCIAL

## 2022-05-23 DIAGNOSIS — N46.9 INFERTILITY MALE: ICD-10-CM

## 2022-05-23 DIAGNOSIS — E23.6 RATHKE'S CLEFT CYST (HCC): ICD-10-CM

## 2022-05-23 DIAGNOSIS — E29.1 HYPOGONADISM MALE: ICD-10-CM

## 2022-05-23 LAB
A/G RATIO: 2.3 (ref 1.1–2.2)
ALBUMIN SERPL-MCNC: 4.8 G/DL (ref 3.4–5)
ALP BLD-CCNC: 55 U/L (ref 40–129)
ALT SERPL-CCNC: 30 U/L (ref 10–40)
ANION GAP SERPL CALCULATED.3IONS-SCNC: 14 MMOL/L (ref 3–16)
AST SERPL-CCNC: 29 U/L (ref 15–37)
BASOPHILS ABSOLUTE: 0 K/UL (ref 0–0.2)
BASOPHILS RELATIVE PERCENT: 1.1 %
BILIRUB SERPL-MCNC: 0.5 MG/DL (ref 0–1)
BUN BLDV-MCNC: 16 MG/DL (ref 7–20)
CALCIUM SERPL-MCNC: 9.6 MG/DL (ref 8.3–10.6)
CHLORIDE BLD-SCNC: 109 MMOL/L (ref 99–110)
CO2: 18 MMOL/L (ref 21–32)
CREAT SERPL-MCNC: 1.1 MG/DL (ref 0.9–1.3)
EOSINOPHILS ABSOLUTE: 0.2 K/UL (ref 0–0.6)
EOSINOPHILS RELATIVE PERCENT: 4.2 %
GFR AFRICAN AMERICAN: >60
GFR NON-AFRICAN AMERICAN: >60
GLUCOSE BLD-MCNC: 94 MG/DL (ref 70–99)
HCT VFR BLD CALC: 40.4 % (ref 40.5–52.5)
HEMOGLOBIN: 13.6 G/DL (ref 13.5–17.5)
LYMPHOCYTES ABSOLUTE: 1.3 K/UL (ref 1–5.1)
LYMPHOCYTES RELATIVE PERCENT: 28.3 %
MCH RBC QN AUTO: 30.1 PG (ref 26–34)
MCHC RBC AUTO-ENTMCNC: 33.7 G/DL (ref 31–36)
MCV RBC AUTO: 89.3 FL (ref 80–100)
MONOCYTES ABSOLUTE: 0.5 K/UL (ref 0–1.3)
MONOCYTES RELATIVE PERCENT: 10.1 %
NEUTROPHILS ABSOLUTE: 2.6 K/UL (ref 1.7–7.7)
NEUTROPHILS RELATIVE PERCENT: 56.3 %
PDW BLD-RTO: 14.2 % (ref 12.4–15.4)
PLATELET # BLD: 147 K/UL (ref 135–450)
PMV BLD AUTO: 8.9 FL (ref 5–10.5)
POTASSIUM SERPL-SCNC: 3.8 MMOL/L (ref 3.5–5.1)
RBC # BLD: 4.52 M/UL (ref 4.2–5.9)
SODIUM BLD-SCNC: 141 MMOL/L (ref 136–145)
TOTAL PROTEIN: 6.9 G/DL (ref 6.4–8.2)
WBC # BLD: 4.6 K/UL (ref 4–11)

## 2022-05-23 PROCEDURE — 84270 ASSAY OF SEX HORMONE GLOBUL: CPT

## 2022-05-23 PROCEDURE — 84403 ASSAY OF TOTAL TESTOSTERONE: CPT

## 2022-05-23 PROCEDURE — 80053 COMPREHEN METABOLIC PANEL: CPT

## 2022-05-23 PROCEDURE — 85025 COMPLETE CBC W/AUTO DIFF WBC: CPT

## 2022-05-23 PROCEDURE — 36415 COLL VENOUS BLD VENIPUNCTURE: CPT

## 2022-05-26 LAB
SEX HORMONE BINDING GLOBULIN: 168 NMOL/L (ref 11–80)
TESTOSTERONE FREE-NONMALE: 13.8 PG/ML (ref 47–244)
TESTOSTERONE TOTAL: 254 NG/DL (ref 220–1000)

## 2022-07-26 ENCOUNTER — HOSPITAL ENCOUNTER (OUTPATIENT)
Age: 57
Discharge: HOME | End: 2022-07-26
Payer: COMMERCIAL

## 2022-07-26 DIAGNOSIS — N46.9 INFERTILITY MALE: ICD-10-CM

## 2022-07-26 DIAGNOSIS — E29.1 HYPOGONADISM MALE: ICD-10-CM

## 2022-07-26 DIAGNOSIS — E23.6 RATHKE'S CLEFT CYST (HCC): ICD-10-CM

## 2022-07-26 LAB
A/G RATIO: 2 (ref 1.1–2.2)
ALBUMIN SERPL-MCNC: 4.2 G/DL (ref 3.4–5)
ALP BLD-CCNC: 54 U/L (ref 40–129)
ALT SERPL-CCNC: 22 U/L (ref 10–40)
ANION GAP SERPL CALCULATED.3IONS-SCNC: 11 MMOL/L (ref 3–16)
AST SERPL-CCNC: 24 U/L (ref 15–37)
BASOPHILS ABSOLUTE: 0 K/UL (ref 0–0.2)
BASOPHILS RELATIVE PERCENT: 0.7 %
BILIRUB SERPL-MCNC: 0.4 MG/DL (ref 0–1)
BUN BLDV-MCNC: 19 MG/DL (ref 7–20)
CALCIUM SERPL-MCNC: 9.4 MG/DL (ref 8.3–10.6)
CHLORIDE BLD-SCNC: 111 MMOL/L (ref 99–110)
CO2: 20 MMOL/L (ref 21–32)
CREAT SERPL-MCNC: 1.3 MG/DL (ref 0.9–1.3)
EOSINOPHILS ABSOLUTE: 0.2 K/UL (ref 0–0.6)
EOSINOPHILS RELATIVE PERCENT: 3.5 %
GFR AFRICAN AMERICAN: >60
GFR NON-AFRICAN AMERICAN: 57
GLUCOSE BLD-MCNC: 110 MG/DL (ref 70–99)
HCT VFR BLD CALC: 40.1 % (ref 40.5–52.5)
HEMOGLOBIN: 13.6 G/DL (ref 13.5–17.5)
LYMPHOCYTES ABSOLUTE: 1.5 K/UL (ref 1–5.1)
LYMPHOCYTES RELATIVE PERCENT: 30.4 %
MCH RBC QN AUTO: 30.4 PG (ref 26–34)
MCHC RBC AUTO-ENTMCNC: 33.9 G/DL (ref 31–36)
MCV RBC AUTO: 89.7 FL (ref 80–100)
MONOCYTES ABSOLUTE: 0.4 K/UL (ref 0–1.3)
MONOCYTES RELATIVE PERCENT: 8.9 %
NEUTROPHILS ABSOLUTE: 2.8 K/UL (ref 1.7–7.7)
NEUTROPHILS RELATIVE PERCENT: 56.5 %
PDW BLD-RTO: 13.3 % (ref 12.4–15.4)
PLATELET # BLD: 126 K/UL (ref 135–450)
PMV BLD AUTO: 9.4 FL (ref 5–10.5)
POTASSIUM SERPL-SCNC: 4.8 MMOL/L (ref 3.5–5.1)
RBC # BLD: 4.47 M/UL (ref 4.2–5.9)
SODIUM BLD-SCNC: 142 MMOL/L (ref 136–145)
TOTAL PROTEIN: 6.3 G/DL (ref 6.4–8.2)
WBC # BLD: 4.9 K/UL (ref 4–11)

## 2022-07-26 PROCEDURE — 84270 ASSAY OF SEX HORMONE GLOBUL: CPT

## 2022-07-26 PROCEDURE — 85025 COMPLETE CBC W/AUTO DIFF WBC: CPT

## 2022-07-26 PROCEDURE — 84403 ASSAY OF TOTAL TESTOSTERONE: CPT

## 2022-07-26 PROCEDURE — 36415 COLL VENOUS BLD VENIPUNCTURE: CPT

## 2022-07-26 PROCEDURE — 80053 COMPREHEN METABOLIC PANEL: CPT

## 2022-07-27 DIAGNOSIS — N46.9 INFERTILITY MALE: ICD-10-CM

## 2022-07-27 DIAGNOSIS — E29.1 HYPOGONADISM MALE: ICD-10-CM

## 2022-07-27 DIAGNOSIS — E23.6 RATHKE'S CLEFT CYST (HCC): ICD-10-CM

## 2022-07-27 PROBLEM — D33.4 BENIGN NEOPLASM OF SPINAL CORD (HCC): Status: ACTIVE | Noted: 2021-10-15

## 2022-07-27 PROBLEM — E66.3 OVERWEIGHT: Status: ACTIVE | Noted: 2021-10-15

## 2022-07-27 PROBLEM — D35.2 PITUITARY ADENOMA (HCC): Status: ACTIVE | Noted: 2021-10-15

## 2022-07-28 ENCOUNTER — OFFICE VISIT (OUTPATIENT)
Dept: ENDOCRINOLOGY | Age: 57
End: 2022-07-28
Payer: COMMERCIAL

## 2022-07-28 VITALS
SYSTOLIC BLOOD PRESSURE: 122 MMHG | HEIGHT: 71 IN | HEART RATE: 76 BPM | OXYGEN SATURATION: 98 % | BODY MASS INDEX: 30.35 KG/M2 | WEIGHT: 216.8 LBS | DIASTOLIC BLOOD PRESSURE: 74 MMHG

## 2022-07-28 DIAGNOSIS — E29.1 HYPOGONADISM MALE: Primary | ICD-10-CM

## 2022-07-28 DIAGNOSIS — N46.9 INFERTILITY MALE: ICD-10-CM

## 2022-07-28 DIAGNOSIS — E23.6 RATHKE'S CLEFT CYST (HCC): ICD-10-CM

## 2022-07-28 LAB
SEX HORMONE BINDING GLOBULIN: 202 NMOL/L (ref 11–80)
TESTOSTERONE FREE-NONMALE: ABNORMAL PG/ML (ref 47–244)
TESTOSTERONE TOTAL: 245 NG/DL (ref 220–1000)

## 2022-07-28 PROCEDURE — 99214 OFFICE O/P EST MOD 30 MIN: CPT | Performed by: INTERNAL MEDICINE

## 2022-07-28 RX ORDER — TESTOSTERONE 16.2 MG/G
2 GEL TRANSDERMAL DAILY
Qty: 75 G | Refills: 2 | Status: SHIPPED | OUTPATIENT
Start: 2022-07-28 | End: 2022-11-03 | Stop reason: SDUPTHER

## 2022-07-28 NOTE — PROGRESS NOTES
Patient ID:   Leslie Quiñonez is a 61 y.o. male    Chief Complaint:   Leslie Quiñonez is seen for an evaluation of  hypogonadism. Subjective:   Ward Valdes had MRI brain showing pituitary tumor in Oct 2021. There is a nonenhancing cystic lesion within the sella extending into the suprasellar cistern measuring approximately 11 x 9 x 14 mm (AP x TRANS x CC). This appears to contact the optic chiasm without definitive elevation. 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      MRI also showed somewhat lobulated T2 hyperintense lesion within the left parietal lobe with associated susceptibility. This is most compatible with a cavernoma. There is no surrounding edema. He saw Dr. Fara Mayorga. He is been told to have small aneurysm. VF normal - Dec 2021      TT 21, [facility] and [address] [place] [address] low normal - Oct 2021   IGF slightly low at 44, ACTH, prolactin, Am cortisol, TSH, T4, Free T3 - Oct 2021     Denies head traumas   Fathered two children, doing well   Does not smoke     Denies drug abuse, opioids, steroids      DM, HTN and metabolic diseases as per pcp     Interval history:      Clomid 50 mg three days (Monday, Wednesday and Friday). He has headaches started Sep 2021. They are slightly better   Libido 3/10, perfomance is low due to ED. He thinks erections are okay. Wife thinks he has issues.    Energy levels are low   Denies morning erections  No issues in concentration    Sleeping well   Shaves once or twice a week   Denies muscle weakness   Denies depression or anxiety   Denies nipple discharge or gynecomastia      The following portions of the patient's history were reviewed and updated as appropriate:      Family History   Problem Relation Age of Onset    Heart Attack Father 54    Diabetes Brother     Cancer Paternal Grandmother          Social History     Socioeconomic History    Marital status:      Spouse name: Not on file    Number of children: Not on file    Years of education: Not on file Highest education level: Not on file   Occupational History    Not on file   Tobacco Use    Smoking status: Never Smoker    Smokeless tobacco: Never Used   Vaping Use    Vaping Use: Never used   Substance and Sexual Activity    Alcohol use: Not Currently     Alcohol/week: 6.0 standard drinks     Types: 6 Standard drinks or equivalent per week    Drug use: No    Sexual activity: Yes     Partners: Female   Other Topics Concern    Not on file   Social History Narrative    Not on file     Social Determinants of Health     Financial Resource Strain: Low Risk     Difficulty of Paying Living Expenses: Not hard at all   Food Insecurity: No Food Insecurity    Worried About [address] in the Last Year: Never true    [address] in the Last Year: Never true   Transportation Needs:     Lack of Transportation (Medical): Not on file    Lack of Transportation (Non-Medical):  Not on file   Physical Activity:     Days of Exercise per Week: Not on file    Minutes of Exercise per Session: Not on file   Stress:     Feeling of Stress : Not on file   Social Connections:     Frequency of Communication with Friends and Family: Not on file    Frequency of Social Gatherings with Friends and Family: Not on file    Attends [employer] Services: Not on file    Active Member of Clubs or Organizations: Not on file    Attends Club or Organization Meetings: Not on file    Marital Status: Not on file   Intimate Partner Violence:     Fear of Current or Ex-Partner: Not on file    Emotionally Abused: Not on file    Physically Abused: Not on file    Sexually Abused: Not on file   Housing Stability:     Unable to Pay for Housing in the Last Year: Not on file    Number of [place] in the Last Year: Not on file    Unstable Housing in the Last Year: Not on file         Past Medical History:   Diagnosis Date    HTN (hypertension)     Lumbar disc herniation 2004, 2007    open worker's comp claim    Other and unspecified hyperlipidemia     Type II or unspecified type diabetes mellitus without mention of complication, not stated as uncontrolled 11/13         Past Surgical History:   Procedure Laterality Date    LUMBAR DISC SURGERY  08/2004, 02/2007    L4-L5 (twice), [place], hardware in place         Allergies   Allergen Reactions    Lisinopril      Dry cough         Current Outpatient Medications:     clomiPHENE (CLOMID) 50 MG tablet, 50 mg three days a week (Monday, Wednesday and Friday), Disp: 12 tablet, Rfl: 1    JANUMET -1000 MG TB24, TAKE ONE TABLET BY MOUTH DAILY WITH BREAKFAST, Disp: 90 tablet, Rfl: 3    fenofibrate (TRIGLIDE) 160 MG tablet, TAKE ONE TABLET BY MOUTH DAILY, Disp: 90 tablet, Rfl: 3    atorvastatin (LIPITOR) 20 MG tablet, TAKE ONE TABLET BY MOUTH ONCE NIGHTLY, Disp: 90 tablet, Rfl: 3    topiramate (TOPAMAX) 50 MG tablet, Take 50 mg by mouth 2 times daily , Disp: , Rfl:     glipiZIDE (GLUCOTROL XL) 10 MG extended release tablet, TAKE ONE TABLET BY MOUTH DAILY, Disp: 30 tablet, Rfl: 3    FARXIGA 10 MG tablet, TAKE ONE TABLET BY MOUTH EVERY MORNING, Disp: 30 tablet, Rfl: 3    losartan (COZAAR) 25 MG tablet, TAKE ONE TABLET BY MOUTH DAILY, Disp: 30 tablet, Rfl: 3    [employer]ER LANCETS MISC, 1 each by Does not apply route 3 times daily, Disp: 100 each, Rfl: 3    blood glucose test strips ([employer] BLOOD GLUCOSE TEST) strip, 1 each by In Vitro route 3 times daily As needed. , Disp: 100 each, Rfl: 3    Multiple Vitamins-Minerals (CENTRUM SILVER PO), Take  by mouth., Disp: , Rfl:     vitamin D (CHOLECALCIFEROL) 1000 UNIT TABS tablet, Take 1,000 Units by mouth daily. , Disp: , Rfl:     aspirin 81 MG tablet, Take 81 mg by mouth daily. , Disp: , Rfl:     Review of Systems:    Constitutional: Negative for fever, chills, and unexpected weight change. HENT: Negative for congestion, ear pain, rhinorrhea,  sore throat and trouble swallowing. Eyes: Negative for photophobia, redness, itching.    Respiratory: Negative for cough, shortness of breath and sputum. Cardiovascular: Negative for chest pain, palpitations and leg swelling. Gastrointestinal: Negative for nausea, vomiting, abdominal pain, diarrhea, constipation. Endocrine: Negative for cold intolerance, heat intolerance, polydipsia, polyphagia and polyuria. Genitourinary: Negative for dysuria, urgency, frequency, hematuria and flank pain. Musculoskeletal: Negative for myalgias, back pain, arthralgias and neck pain. Skin/Nail: Negative for rash, itching. Normal nails. Neurological: Negative for seizures, weakness, light-headedness, numbness and headaches. Hematological/ Lymph nodes: Negative for adenopathy. Does not bruise/bleed easily. Psychiatric/Behavioral: Negative for suicidal ideas, depression, anxiety, sleep disturbance and decreased concentration. Objective:   Physical Exam:    /79 (Site: Right Upper Arm, Position: Sitting, Cuff Size: Large Adult)   Pulse 76   Ht 5' 11\" (1.803 m)   Wt 218 lb 6.4 oz (99.1 kg)   SpO2 98%   BMI 30.46 kg/m²       Constitutional: Patient is oriented to person, place, and time. Patient appears well-developed and well-nourished. HENT:    Head: Normocephalic and atraumatic. Eyes: Conjunctivae and EOM are normal.      Neck: Normal range of motion. Cardiovascular: Normal rate, regular rhythm and normal heart sounds. Pulmonary/Chest: Effort normal and breath sounds normal.    Musculoskeletal: Normal range of motion. Neurological: Patient is alert and oriented to person, place, and time. Patient has normal reflexes. Skin: Skin is warm and dry. Psychiatric: Patient has a normal mood and affect.  Patient behavior is normal.     Lab Review:      Hospital Outpatient Visit on 05/02/2022   Component Date Value Ref Range Status    BUN 05/02/2022 20  7 - 20 mg/dL Final    CREATININE 05/02/2022 1.3  0.9 - 1.3 mg/dL Final    GFR Non- 05/02/2022 57* >60 Final    GFR  05/02/2022 >60  >60 Final   Office Visit on 02/14/2022   Component Date Value Ref Range Status    Hemoglobin A1C 02/14/2022 5.2  % Final   Orders Only on 10/19/2021   Component Date Value Ref Range Status    Growth Hormone 10/19/2021 0.16  0.05 - 3.00 ng/mL Final   Orders Only on 10/19/2021   Component Date Value Ref Range Status    IGF-1 (INSULIN-LIKE GROWTH I) 10/19/2021 44* 58 - 204 ng/mL Final    Insulin-Like GF-1 Z-Score 10/19/2021 -3.2   Final   Orders Only on 10/19/2021   Component Date Value Ref Range Status    ACTH 10/19/2021 16  7 - 69 pg/mL Final   Orders Only on 10/19/2021   Component Date Value Ref Range Status    Testosterone 10/19/2021 21* 220 - 1,000 ng/dL Final   Orders Only on 10/19/2021   Component Date Value Ref Range Status    Prolactin 10/19/2021 9.7  ng/mL Final   Orders Only on 10/19/2021   Component Date Value Ref Range Status    LH 10/19/2021 1.2  mIU/mL Final   Orders Only on 10/19/2021   Component Date Value Ref Range Status    FSH 10/19/2021 3.3  mIU/mL Final   Orders Only on 10/19/2021   Component Date Value Ref Range Status    Cortisol - AM 10/19/2021 13.5  4.3 - 22.4 ug/dL Final   There may be more visits with results that are not included. Assessment and Plan       Chase Beasley was seen today for hypogonadism.     Diagnoses and all orders for this visit:    Hypogonadism male  -     clomiPHENE (CLOMID) 50 MG tablet; 50 mg three days a week (Monday, Wednesday and Friday)  -     CBC with Auto Differential; Standing  -     Comprehensive Metabolic Panel; Standing  -     Testosterone, free, total; Standing    Infertility male  -     clomiPHENE (CLOMID) 50 MG tablet; 50 mg three days a week (Monday, Wednesday and Friday)  -     CBC with Auto Differential; Standing  -     Comprehensive Metabolic Panel; Standing  -     Testosterone, free, total; Standing    Rathke's cleft cyst (HCC)  -     clomiPHENE (CLOMID) 50 MG tablet; 50 mg three days a week (Monday, Wednesday and Friday)  -     CBC with Auto Differential; Standing  - Comprehensive Metabolic Panel; Standing  -     Testosterone, free, total; Standing        1: 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 Osmel Bernal     MRI June 2022: cyst collaped. Next RI in June 2023        2: Male hypogonadism       - July 2022     Clomid failed     Start Androgel 1.6% , 2 actuations daily      The FDA is updating the labels for all prescription testosterone products to warn of their potential for abuse and dependence. Adverse effects linked to testosterone abuse include myocardial infarction, heart failure, stroke, male infertility, depression,  Aggression,  blood clots, prostate hyperplasia/cancer, sleep apnea, acne, and breast enlargement. Men who use a testosterone gel should wash their hands thoroughly after applying a dose and make sure that no one else touches the spots where they medicate. If a woman or child comes into contact with testosterone gels, it can cause side effects in them, including hair growth and premature puberty. Testosterone abuse often occurs with other anabolic androgenic steroids and at higher doses than what is prescribed. Withdrawal symptoms have included fatigue, irritability, loss of appetite, reduced libido, and insomnia.      4: ED   If it continues after nomral T levels then he will need a Urology evaluation          RTC in 3 months with CBC, CMP, TT ,PSA       Electronically signed by New Brody MD on 5/19/2022 at 4:56 PM

## 2022-07-29 DIAGNOSIS — E11.9 TYPE 2 DIABETES MELLITUS WITHOUT COMPLICATION, WITHOUT LONG-TERM CURRENT USE OF INSULIN (HCC): ICD-10-CM

## 2022-07-29 RX ORDER — GLIPIZIDE 10 MG/1
TABLET, FILM COATED, EXTENDED RELEASE ORAL
Qty: 30 TABLET | Refills: 3 | Status: SHIPPED | OUTPATIENT
Start: 2022-07-29

## 2022-07-29 RX ORDER — LOSARTAN POTASSIUM 25 MG/1
TABLET ORAL
Qty: 30 TABLET | Refills: 3 | Status: SHIPPED | OUTPATIENT
Start: 2022-07-29

## 2022-07-29 RX ORDER — DAPAGLIFLOZIN 10 MG/1
TABLET, FILM COATED ORAL
Qty: 30 TABLET | Refills: 3 | Status: SHIPPED | OUTPATIENT
Start: 2022-07-29

## 2022-07-29 NOTE — TELEPHONE ENCOUNTER
Medication:   Requested Prescriptions     Pending Prescriptions Disp Refills    FARXIGA 10 MG tablet [Pharmacy Med Name: Duyen Garland 10 MG TABLET] 30 tablet 3     Sig: TAKE ONE TABLET BY MOUTH EVERY MORNING    losartan (COZAAR) 25 MG tablet [Pharmacy Med Name: Carl  POTASSIUM 25 MG TAB] 30 tablet 3     Sig: TAKE ONE TABLET BY MOUTH DAILY    glipiZIDE (GLUCOTROL XL) 10 MG extended release tablet [Pharmacy Med Name: glipiZIDE XL 10 MG TABLET] 30 tablet 3     Sig: TAKE ONE TABLET BY MOUTH DAILY     Last Filled:  11.8.21 #30 refills 3                        11.8.21 #30 refills 3                         11.8.21 #30 refills 3    Last appt: 2/14/2022   Next appt: 8/15/2022    Last [place]:   RX Monitoring 7/28/2022   Periodic Controlled Substance Monitoring No signs of potential drug abuse or diversion identified.

## 2022-08-15 ENCOUNTER — OFFICE VISIT (OUTPATIENT)
Dept: FAMILY MEDICINE CLINIC | Age: 57
End: 2022-08-15
Payer: COMMERCIAL

## 2022-08-15 VITALS
SYSTOLIC BLOOD PRESSURE: 126 MMHG | HEIGHT: 71 IN | DIASTOLIC BLOOD PRESSURE: 78 MMHG | WEIGHT: 217 LBS | BODY MASS INDEX: 30.38 KG/M2 | HEART RATE: 84 BPM

## 2022-08-15 DIAGNOSIS — E78.1 HYPERTRIGLYCERIDEMIA: ICD-10-CM

## 2022-08-15 DIAGNOSIS — E11.9 TYPE 2 DIABETES MELLITUS WITHOUT COMPLICATION, WITHOUT LONG-TERM CURRENT USE OF INSULIN (HCC): ICD-10-CM

## 2022-08-15 DIAGNOSIS — E29.1 HYPOGONADISM MALE: ICD-10-CM

## 2022-08-15 DIAGNOSIS — I15.2 HYPERTENSION DUE TO ENDOCRINE DISORDER: ICD-10-CM

## 2022-08-15 DIAGNOSIS — M72.0 DUPUYTREN'S CONTRACTURE OF LEFT HAND: ICD-10-CM

## 2022-08-15 DIAGNOSIS — Z00.00 ANNUAL PHYSICAL EXAM: Primary | ICD-10-CM

## 2022-08-15 LAB — HBA1C MFR BLD: 5.1 %

## 2022-08-15 PROCEDURE — 99396 PREV VISIT EST AGE 40-64: CPT | Performed by: FAMILY MEDICINE

## 2022-08-15 PROCEDURE — 99214 OFFICE O/P EST MOD 30 MIN: CPT | Performed by: FAMILY MEDICINE

## 2022-08-15 PROCEDURE — 83036 HEMOGLOBIN GLYCOSYLATED A1C: CPT | Performed by: FAMILY MEDICINE

## 2022-08-15 PROCEDURE — 82044 UR ALBUMIN SEMIQUANTITATIVE: CPT | Performed by: FAMILY MEDICINE

## 2022-08-15 RX ORDER — METFORMIN HYDROCHLORIDE 500 MG/1
1000 TABLET, EXTENDED RELEASE ORAL
Qty: 60 TABLET | Refills: 5 | Status: SHIPPED | OUTPATIENT
Start: 2022-08-15

## 2022-08-15 SDOH — ECONOMIC STABILITY: FOOD INSECURITY: WITHIN THE PAST 12 MONTHS, YOU WORRIED THAT YOUR FOOD WOULD RUN OUT BEFORE YOU GOT MONEY TO BUY MORE.: NEVER TRUE

## 2022-08-15 SDOH — ECONOMIC STABILITY: FOOD INSECURITY: WITHIN THE PAST 12 MONTHS, THE FOOD YOU BOUGHT JUST DIDN'T LAST AND YOU DIDN'T HAVE MONEY TO GET MORE.: NEVER TRUE

## 2022-08-15 ASSESSMENT — PATIENT HEALTH QUESTIONNAIRE - PHQ9
1. LITTLE INTEREST OR PLEASURE IN DOING THINGS: 0
SUM OF ALL RESPONSES TO PHQ QUESTIONS 1-9: 0
2. FEELING DOWN, DEPRESSED OR HOPELESS: 0
SUM OF ALL RESPONSES TO PHQ9 QUESTIONS 1 & 2: 0
SUM OF ALL RESPONSES TO PHQ QUESTIONS 1-9: 0

## 2022-08-15 ASSESSMENT — SOCIAL DETERMINANTS OF HEALTH (SDOH): HOW HARD IS IT FOR YOU TO PAY FOR THE VERY BASICS LIKE FOOD, HOUSING, MEDICAL CARE, AND HEATING?: NOT HARD AT ALL

## 2022-08-15 NOTE — PROGRESS NOTES
SUBJECTIVE:   Juanita Preston is a 61 y.o. male presenting for his annual checkup. HPI: Diabetes Mellitus Type II, Follow-up--   Lab Results   Component Value Date    LABA1C 5.1 8/15/2022       Lab Results   Component Value Date    .0 08/11/2021     Checks sugars at home:Yes. postprandial range:  . Last Eye Exam was in the past year. Pt is on ACEI or ARB. Pt denies foot ulcerations, hyperglycemia, and paresthesia of the feet. Insulin Treated? No.    HTN--Pt seen here for follow up regarding HTN. BP checks at home:No  Pt denies chest pain, palpitations, and peripheral edema. Pt complains of blurred vision, chest pain, palpitations, and peripheral edema. Tolerating medications: Yes. Pt does not exercise regularly and does adhere to a low salt and low fat diet. Hyperlipidemia:    Lab Results   Component Value Date    LDLCALC 104 (H) 08/11/2021   . He does not have myalgias from medication. Pt is  following Lifestyle modification including Low fat/low cholesterol diet, low carbohydrate diet, and  does not exercise regularly. C/o nodule in palm of left hand- not painful. Is able to move hand/fingers normally. Seeing endo for hypogonadism. Patient Active Problem List   Diagnosis    Lumbar disc herniation    Umbilical hernia    Vitamin D deficiency    Obesity (BMI 30-39. 9)    Hypertriglyceridemia    Type 2 diabetes mellitus without complication, without long-term current use of insulin (HCC)    HTN (hypertension)    Fam hx-ischem heart disease    Cerebral cavernoma    Chronic intractable headache    Rathke's cleft cyst ([facility] [address]    Aneurysm of intracranial portion of right internal carotid artery    Hypogonadism male    Infertility male    Benign neoplasm of spinal cord (HCC)    Overweight    Pituitary adenoma ([address]       Past Medical History:   Diagnosis Date    HTN (hypertension)     Lumbar disc herniation 2004, 2007    open worker's comp claim    Other and unspecified hyperlipidemia     Type II or unspecified type diabetes mellitus without mention of complication, not stated as uncontrolled 11/13       Past Surgical History:   Procedure Laterality Date    Ami 60 SURGERY  08/2004, 02/2007    L4-L5 (twice), Jania, hardware in place       Social History     Socioeconomic History    Marital status:    Tobacco Use    Smoking status: Never    Smokeless tobacco: Never   Vaping Use    Vaping Use: Never used   Substance and Sexual Activity    Alcohol use: Not Currently     Alcohol/week: 6.0 standard drinks     Types: 6 Standard drinks or equivalent per week    Drug use: No    Sexual activity: Yes     Partners: Female     Social Determinants of Health     Financial Resource Strain: Low Risk     Difficulty of Paying Living Expenses: Not hard at all   Food Insecurity: No Food Insecurity    Worried About Running Out of Food in the Last Year: Never true    Ran Out of Food in the Last Year: Never true       Family History   Problem Relation Age of Onset    Heart Attack Father 54    Diabetes Brother     Cancer Paternal Grandmother        Current Outpatient Medications   Medication Sig Dispense Refill    FARXIGA 10 MG tablet TAKE ONE TABLET BY MOUTH EVERY MORNING 30 tablet 3    losartan (COZAAR) 25 MG tablet TAKE ONE TABLET BY MOUTH DAILY 30 tablet 3    glipiZIDE (GLUCOTROL XL) 10 MG extended release tablet TAKE ONE TABLET BY MOUTH DAILY 30 tablet 3    Testosterone (ANDROGEL) 20.25 MG/ACT (1.62%) GEL gel Place 2 actuation onto the skin in the morning for 74 days.  75 g 2    JANUMET -1000 MG TB24 TAKE ONE TABLET BY MOUTH DAILY WITH BREAKFAST 90 tablet 3    fenofibrate (TRIGLIDE) 160 MG tablet TAKE ONE TABLET BY MOUTH DAILY 90 tablet 3    atorvastatin (LIPITOR) 20 MG tablet TAKE ONE TABLET BY MOUTH ONCE NIGHTLY 90 tablet 3    topiramate (TOPAMAX) 50 MG tablet Take 50 mg by mouth 2 times daily       [employer] LANCETS MISC 1 each by Does not apply route 3 times daily 100 each 3 blood glucose test strips ([employer] BLOOD GLUCOSE TEST) strip 1 each by In Vitro route 3 times daily As needed. 100 each 3    Multiple Vitamins-Minerals (CENTRUM SILVER PO) Take  by mouth.      vitamin D (CHOLECALCIFEROL) 1000 UNIT TABS tablet Take 1,000 Units by mouth daily. aspirin 81 MG tablet Take 81 mg by mouth daily. No current facility-administered medications for this visit. Allergies: Lisinopril     Health Maintenance   Topic Date Due    COVID-19 Vaccine (1) Never done    Pneumococcal 0-64 years Vaccine (1 - PCV) Never done    Diabetic retinal exam  10/18/2018    Diabetic microalbuminuria test  02/20/2021    Diabetic foot exam  12/07/2021    Lipids  08/11/2022    Depression Screen  08/11/2022    Hepatitis B vaccine (1 of 3 - Risk 3-dose series) 02/14/2023 (Originally 6/6/1984)    Shingles vaccine (1 of 2) 02/14/2023 (Originally 6/6/2014)    Flu vaccine (1) 09/01/2022    Colorectal Cancer Screen  01/29/2023    A1C test (Diabetic or Prediabetic)  02/14/2023    DTaP/Tdap/Td vaccine (2 - Td or Tdap) 12/17/2023    Hepatitis C screen  Completed    Hepatitis A vaccine  Aged Out    Hib vaccine  Aged Out    Meningococcal (ACWY) vaccine  Aged Out    HIV screen  Discontinued         ROS:    Skin: no changing moles, abnormal pigmentation, rash, scaling, itching, masses, hair or nail changes  Eyes: no change in vision  Ears/Nose/Throat: no hearing loss, tinnitus, vertigo, nosebleed, nasal congestion, rhinorrhea, sore throat  Respiratory: no cough or shortness of breath  Cardiovascular: no chest pain, BO, orthopnea, PND, palpitations, or claudication  Gastrointestinal: no nausea, vomiting, abdominal pain or change in stools. No blood in stools. Genitourinary: no urinary symptoms or incontinence. No erectile dysfunction.   Musculoskeletal: no arthritis, arthralgia, myalgia or weakness  Neurologic: no weakness/numbness, seizures, or headaches  Hematologic/Lymphatic/Immunologic: no abnormal bleeding/bruising, fever, chills, night sweats, swollen glands, or unexplained weight loss  Endocrine: no heat or cold intolerance and no polyphagia, polydipsia, or polyuria    OBJECTIVE:   /78 (Site: Right Upper Arm, Position: Sitting, Cuff Size: Medium Adult)   Pulse 84   Ht 5' 11\" (1.803 m)   Wt 217 lb (98.4 kg)   BMI 30.27 kg/m²   General appearance: healthy, alert, no distress  Skin: Skin color, texture, turgor normal. No rashes or suspicious lesions. No induration or tightening palpated. Head: Normocephalic. No masses, lesions, tenderness or abnormalities  Eyes: Conjunctivae/corneas clear. PERRL, EOM's intact. Ears: External ears normal. Canals clear. TM's clear bilaterally. Hearing grossly normal.  Nose/Sinuses: Nares normal.   Oropharynx: Lips, mucosa, and tongue normal. Teeth and gums normal. Oropharynx clear with no exudate seen. Neck: Neck supple, and symmetric. No adenopathy. Thyroid symmetric, normal size, without nodule. Trachea is midline. Back: Back symmetric, no curvature. ROM normal. No CVA tenderness. Lungs: Good diaphragmatic excursion. Lungs clear to auscultation bilaterally. No retractions or use of accessory muscles. Heart: PMI is not displaced, and no thrill noted. Regular rate and rhythm, with no rub, murmur or gallop noted. Abdomen: Abdomen soft, non-tender. BS normal. No masses, organomegaly. No hernia noted. Extremities: Extremities normal. No deformities, edema, or skin discoloration. No cyanosis or clubbing noted to the nails. Hands and feet were warm and well-perfused with palpable dorsalis pedis pulses bilaterally. Lymph: No lymphadenopathy of the neck or supraclavicular regions. Musculoskeletal: Spine ROM normal. Muscular strength intact. Neuro: Cranial nerves intact, gait normal. No focal weakness. ASSESSMENT/PLAN:  1. Annual physical exam  - PSA, Prostatic Specific Antigen; Future    2.  Type 2 diabetes mellitus without complication, without long-term current use of insulin ([facility] [address]  Has had some hypoglycemic events  Will d/c januvia  Continue farxiga, metformin and MARTINO  Follow up if persists, will decrease or stop MARTINO  - POCT glycosylated hemoglobin (Hb A1C)  - metFORMIN (GLUCOPHAGE-XR) 500 MG extended release tablet; Take 2 tablets by mouth daily (with breakfast)  Dispense: 60 tablet; Refill: 5  - HM DIABETES FOOT EXAM  - POCT microalbumin    3. Hypertension due to endocrine disorder  Stable. Continue current medications     4. Hypertriglyceridemia  Check lipids. Continue statin/triglide  - Lipid Panel; Future    5. Hypogonadism male  Stable. Follow up with endo as scheduled     6. Dupuytren's contracture of left hand  Acute issue  Supportive care discussed  To hand surgeon if symptoms worsen         All health maintenance issues were updated.   Recommend begin progressive daily aerobic exercise program and follow a low fat, low cholesterol diet

## 2022-10-13 NOTE — TELEPHONE ENCOUNTER
Dominick Bishop  1993  10/17/22    Chief Complaint   Patient presents with    Diabetes     Patient here for next weeks follow-up regarding his diabetes and anxiety and depression, still complaining of cough and rash abdominal wall           Patient here for 6 weeks follow-up regarding his diabetes, anxiety and depression we did start him on Trulicity last visit he took it for 1 month. And it did help his blood sugar. He is already taking insulin and he is taking glimepiride. We did increase his Zoloft and we added the Cymbalta which is also did help his anxiety and depression he is doing much better  Patient also complaining of cough nasal congestion, going on for 2 weeks, associated with some wheezing, no fever no chest pain.   And also complaining of a rash on his abdominal wall going on for months and would like to see a dermatologist.      Past Medical History:   Diagnosis Date    Depression 5/31/2016    Essential hypertension 5/31/2016    Hypertriglyceridemia 4/25/2017    New onset type 2 diabetes mellitus ([facility] [address] 2/6/2021     Past Surgical History:   Procedure Laterality Date    CHOLECYSTECTOMY, LAPAROSCOPIC  06/12/2018     Family History   Problem Relation Age of Onset    Diabetes Maternal Uncle     Stroke Maternal Uncle     Cancer Maternal Grandmother     Heart Disease Maternal Grandmother     Stroke Maternal Grandmother     Stroke Maternal Grandfather      Social History     Socioeconomic History    Marital status:      Spouse name: Not on file    Number of children: Not on file    Years of education: Not on file    Highest education level: Not on file   Occupational History    Not on file   Tobacco Use    Smoking status: Every Day     Packs/day: 0.25     Years: 9.00     Pack years: 2.25     Types: E-Cigarettes, Cigarettes    Smokeless tobacco: Never    Tobacco comments:     smokes vape   Vaping Use    Vaping Use: Every day    Substances: Always   Substance and Sexual Activity    Alcohol Medication:   Requested Prescriptions     Pending Prescriptions Disp Refills    atorvastatin (LIPITOR) 20 MG tablet [Pharmacy Med Name: ATORVASTATIN 20 MG TABLET] 30 tablet 0     Sig: TAKE ONE TABLET BY MOUTH ONCE NIGHTLY       Last Filled:  1/3/2020 #90 w/1 refills     Patient Phone Number: [phone] (home) [phone] (work)    Last appt: 2/20/2020   Next appt: 9/3/2020    Last Lipid:   Lab Results   Component Value Date    CHOL 140 02/07/2019    TRIG 258 02/07/2019    HDL 36 02/07/2019    HDL 38 02/21/2011    LDLCALC 52 02/07/2019 use: Yes     Comment: socially    Drug use: No    Sexual activity: Yes     Partners: Female   Other Topics Concern    Not on file   Social History Narrative    Not on file     Social Determinants of Health     Financial Resource Strain: Not on file   Food Insecurity: Not on file   Transportation Needs: Not on file   Physical Activity: Not on file   Stress: Not on file   Social Connections: Not on file   Intimate Partner Violence: Not on file   Housing Stability: Not on file       Allergies   Allergen Reactions    Tape Frances Marcela Tape] Rash     And tegaderms     Current Outpatient Medications   Medication Sig Dispense Refill    Dulaglutide 1.5 MG/0.5ML SOPN Inject 1.5 mg into the skin once a week 12 Adjustable Dose Pre-filled Pen Syringe 3    sertraline (ZOLOFT) 50 MG tablet TAKE 1 TABLET BY MOUTH EVERY DAY 90 tablet 3    DULoxetine (CYMBALTA) 30 MG extended release capsule Take 1 capsule by mouth daily 90 capsule 3    pantoprazole (PROTONIX) 40 MG tablet TAKE 1 TABLET BY MOUTH TWO TIMES A  tablet 3    glimepiride (AMARYL) 1 MG tablet TAKE 1 TABLET BY MOUTH TWO TIMES A  tablet 3    topiramate (TOPAMAX) 50 MG tablet TAKE 1 TABLET BY MOUTH TWO TIMES DAILY 180 tablet 3    insulin glargine (LANTUS SOLOSTAR) 100 UNIT/ML injection pen Inject 80 Units into the skin nightly 15 mL 5    ibuprofen (ADVIL;MOTRIN) 800 MG tablet Take 1 tablet by mouth 2 times daily as needed for Pain 60 tablet 5    guaiFENesin (MUCINEX) 600 MG extended release tablet Take 2 tablets by mouth 2 times daily for 10 days 40 tablet 0    azithromycin (ZITHROMAX) 250 MG tablet Take 1 tablet by mouth See Admin Instructions for 5 days 500mg on day 1 followed by 250mg on days 2 - 5 6 tablet 0    insulin lispro, 1 Unit Dial, 100 UNIT/ML SOPN INJECT 5 UNITS INTO THE SKIN 3 TIMES DAILY BEFORE MEALS 15 mL 0    albuterol sulfate  (90 Base) MCG/ACT inhaler Inhale 2 puffs into the lungs 4 times daily as needed for Wheezing 54 g 5    cloNIDine (CATAPRES) 0.1 MG tablet TAKE ONE TABLET BY MOUTH TWICE DAILY 180 tablet 3    metoprolol succinate (TOPROL XL) 50 MG extended release tablet Take 1 tablet by mouth daily REFILL ONE TIME UNTIL SEES DR 90 tablet 3    lisinopril (PRINIVIL;ZESTRIL) 20 MG tablet Take 1 tablet by mouth daily REFILLED 1 TIME UNTIL SEES DR. 90 tablet 3    dilTIAZem (CARDIZEM CD) 120 MG extended release capsule Take 1 capsule by mouth daily 90 capsule 3    Insulin Pen Needle 29G X 12MM MISC 1 each by Does not apply route daily 100 each 3    Continuous Blood Gluc  (DEXCOM G6 ) WILLIAN Check BS 4 times daily 1 Device 0    Continuous Blood Gluc Sensor (DEXCOM G6 SENSOR) MISC Check BS 4-5 times daily 1 each 5    blood glucose monitor kit and supplies Use 3-4 times daily 1 kit 0    blood glucose monitor strips Test 3-4 times a day & as needed for symptoms of irregular blood glucose. 100 strip 5    Glucosource Lancets MISC Use one 3-4 times to check blood sugar 100 each 5     No current facility-administered medications for this visit. Review of Systems   Constitutional:  Positive for activity change. Negative for chills and fever. HENT:  Positive for congestion, sinus pressure and sore throat. Respiratory:  Positive for cough and wheezing. Negative for shortness of breath. Cardiovascular:  Negative for chest pain and leg swelling. Gastrointestinal:  Negative for abdominal pain. Genitourinary:  Negative for dysuria. Musculoskeletal:  Positive for back pain. Skin:  Positive for rash (On the abdominal wall). Neurological:  Negative for numbness and headaches. Psychiatric/Behavioral:  Positive for agitation and sleep disturbance. The patient is nervous/anxious.          All the symptoms getting better     Lab Results   Component Value Date    WBC 8.7 05/28/2021    HGB 17.1 05/28/2021    HCT 48.5 05/28/2021    MCV 88.0 05/28/2021     05/28/2021     Lab Results   Component Value Date     05/28/2021 K 3.7 05/28/2021    CL 98 (L) 05/28/2021    CO2 26 05/28/2021    BUN 6 05/28/2021    CREATININE 0.7 (L) 05/28/2021    GLUCOSE 116 (H) 09/27/2019    CALCIUM 9.2 05/28/2021    PROT 6.6 05/28/2021    LABALBU 4.2 05/28/2021    BILITOT 0.9 05/28/2021    ALKPHOS 104 05/28/2021    AST 63 (H) 05/28/2021    ALT 96 (H) 05/28/2021    LABGLOM >60 05/28/2021    GFRAA >60 05/28/2021     Lab Results   Component Value Date    CHOL 131 05/28/2021    CHOL 125 06/07/2018    CHOL 135 03/22/2018     Lab Results   Component Value Date    TRIG 173 (H) 05/28/2021    TRIG 91 06/07/2018    TRIG 166 (H) 03/22/2018     Lab Results   Component Value Date    HDL 35 (L) 05/28/2021    HDL 33 (L) 06/07/2018    HDL 33 (L) 03/22/2018     Lab Results   Component Value Date    LDLCALC 32 04/22/2017     Lab Results   Component Value Date    LABA1C 7.7 10/13/2022     Lab Results   Component Value Date    TSHHS 1.650 05/28/2021         /80 (Site: Left Upper Arm, Position: Sitting, Cuff Size: Large Adult)   Pulse 78   Ht 6' 2\" (1.88 m)   Wt 299 lb 12.8 oz (136 kg)   SpO2 98%   BMI 38.49 kg/m²     BP Readings from Last 3 Encounters:   10/13/22 120/80   08/18/22 120/80   04/09/22 (!) 153/105       Wt Readings from Last 3 Encounters:   10/13/22 299 lb 12.8 oz (136 kg)   08/18/22 (!) 302 lb 3.2 oz (137.1 kg)   09/07/21 (!) 313 lb (142 kg)         Physical Exam  Constitutional:       General: He is not in acute distress. Appearance: Normal appearance. He is well-developed. He is obese. He is not diaphoretic. HENT:      Head: Normocephalic and atraumatic. Eyes:      Pupils: Pupils are equal, round, and reactive to light. Cardiovascular:      Rate and Rhythm: Normal rate and regular rhythm. Heart sounds: Normal heart sounds. No murmur heard. Pulmonary:      Effort: Pulmonary effort is normal.      Breath sounds: Normal breath sounds. No wheezing or rales. Musculoskeletal:         General: Normal range of motion.       Cervical back: Normal range of motion and neck supple. No rigidity. Right lower leg: No edema. Left lower leg: No edema. Skin:     Comments: There is papular erythematous rash affecting the suprapubic area it is most likely from using belt   Neurological:      General: No focal deficit present. Mental Status: He is alert and oriented to person, place, and time. Psychiatric:         Mood and Affect: Mood normal.         Behavior: Behavior normal.         Thought Content: Thought content normal.         Judgment: Judgment normal.       ASSESSMENT/ PLAN:    1. Type 2 diabetes mellitus without complication, without long-term current use of insulin (HCC)  -His A1c went down so the Trulicity did help him we will increase the dose to 1.5 continue the Lantus and continue the glimepiride  - POCT glycosylated hemoglobin (Hb A1C)  - Dulaglutide 1.5 MG/0.5ML SOPN; Inject 1.5 mg into the skin once a week  Dispense: 12 Adjustable Dose Pre-filled Pen Syringe; Refill: 3  - insulin glargine (LANTUS SOLOSTAR) 100 UNIT/ML injection pen; Inject 80 Units into the skin nightly  Dispense: 15 mL; Refill: 5    2. New onset type 2 diabetes mellitus (HCC)  -As above  - glimepiride (AMARYL) 1 MG tablet; TAKE 1 TABLET BY MOUTH TWO TIMES A DAY  Dispense: 180 tablet; Refill: 3    3. Essential hypertension  -Stable he is on metoprolol, lisinopril, and Cardizem      4. Anxiety and depression  -Last visit we did increase his Zoloft and did add the Cymbalta which did help him  - sertraline (ZOLOFT) 50 MG tablet; TAKE 1 TABLET BY MOUTH EVERY DAY  Dispense: 90 tablet; Refill: 3  - DULoxetine (CYMBALTA) 30 MG extended release capsule; Take 1 capsule by mouth daily  Dispense: 90 capsule; Refill: 3    5. Chronic midline low back pain with sciatica, sciatica laterality unspecified  -His pain is still the same but there may be to some doctor did little help  - DULoxetine (CYMBALTA) 30 MG extended release capsule;  Take 1 capsule by mouth daily  Dispense: 90 capsule; Refill: 3  -He is going to follow-up with the neurosurgeon for possible back surgery    6. Gastroesophageal reflux disease without esophagitis  -Stable  - pantoprazole (PROTONIX) 40 MG tablet; TAKE 1 TABLET BY MOUTH TWO TIMES A DAY  Dispense: 180 tablet; Refill: 3    7. Pain in both hands  - ibuprofen (ADVIL;MOTRIN) 800 MG tablet; Take 1 tablet by mouth 2 times daily as needed for Pain  Dispense: 60 tablet; Refill: 5    8. Acute cough  -Medical exam normal but because it is going on for 2 weeks so we will give him antibiotic  - guaiFENesin (MUCINEX) 600 MG extended release tablet; Take 2 tablets by mouth 2 times daily for 10 days  Dispense: 40 tablet; Refill: 0    9. Bronchitis  -Because it is going on for 2 weeks we will give him antibiotic  - guaiFENesin (MUCINEX) 600 MG extended release tablet; Take 2 tablets by mouth 2 times daily for 10 days  Dispense: 40 tablet; Refill: 0  - azithromycin (ZITHROMAX) 250 MG tablet; Take 1 tablet by mouth See Admin Instructions for 5 days 500mg on day 1 followed by 250mg on days 2 - 5  Dispense: 6 tablet; Refill: 0    10. Dermatitis  -Under abdominal wall suprapubic area, contact dermatitis is a from the belt, and would like to see the dermatology  - External Referral To Dermatology    Jacquelin Carrera was evaluated today and a DME order was entered for a nebulizer compressor in order to administer Albuterol due to the diagnosis of bronchospasm, SOB. The need for this equipment and treatment was discussed with the patient and he understands and is in agreement. - All old blood work reviewed with the patient  - Appropriate prescription are addressed. - After visit montrell provided. - Questions answered and patient verbalizes understanding.  - Call for any problem, questions, or concerns. Return in about 6 months (around 4/13/2023).

## 2022-10-26 DIAGNOSIS — N46.9 INFERTILITY MALE: ICD-10-CM

## 2022-10-26 DIAGNOSIS — E29.1 HYPOGONADISM MALE: ICD-10-CM

## 2022-10-26 RX ORDER — TESTOSTERONE 16.2 MG/G
GEL TRANSDERMAL
Qty: 75 G | OUTPATIENT
Start: 2022-10-26

## 2022-10-26 NOTE — TELEPHONE ENCOUNTER
Requested Refill:   Requested Prescriptions     Pending Prescriptions Disp Refills    Testosterone (ANDROGEL) 20.25 MG/ACT (1.62%) GEL gel [Pharmacy Med Name: TESTOSTERONE 1.62% GEL PUMP] 75 g      Sig: APPLY 2 PUMP ACTUATIONS (40.5 MG/2.5 GM) TOPICALLY ONCE DAILY ONTO THE SKIN IN THE MORNING       Last refilled: 7/28/2022 # 75 g with 2 refills     Last Appt: 7/28/2022  Next Appt: 11/3/2022

## 2022-10-27 ENCOUNTER — HOSPITAL ENCOUNTER (OUTPATIENT)
Age: 57
Discharge: HOME | End: 2022-10-27
Payer: COMMERCIAL

## 2022-10-27 DIAGNOSIS — E78.1 HYPERTRIGLYCERIDEMIA: ICD-10-CM

## 2022-10-27 DIAGNOSIS — N46.9 INFERTILITY MALE: ICD-10-CM

## 2022-10-27 DIAGNOSIS — E29.1 HYPOGONADISM MALE: ICD-10-CM

## 2022-10-27 LAB
A/G RATIO: 2.1 (ref 1.1–2.2)
ALBUMIN SERPL-MCNC: 4.9 G/DL (ref 3.4–5)
ALP BLD-CCNC: 58 U/L (ref 40–129)
ALT SERPL-CCNC: 34 U/L (ref 10–40)
ANION GAP SERPL CALCULATED.3IONS-SCNC: 14 MMOL/L (ref 3–16)
AST SERPL-CCNC: 25 U/L (ref 15–37)
BASOPHILS ABSOLUTE: 0.1 K/UL (ref 0–0.2)
BASOPHILS RELATIVE PERCENT: 1.2 %
BILIRUB SERPL-MCNC: 0.5 MG/DL (ref 0–1)
BUN BLDV-MCNC: 16 MG/DL (ref 7–20)
CALCIUM SERPL-MCNC: 9.9 MG/DL (ref 8.3–10.6)
CHLORIDE BLD-SCNC: 107 MMOL/L (ref 99–110)
CHOLESTEROL, TOTAL: 174 MG/DL (ref 0–199)
CO2: 20 MMOL/L (ref 21–32)
CREAT SERPL-MCNC: 1.1 MG/DL (ref 0.9–1.3)
EOSINOPHILS ABSOLUTE: 0.2 K/UL (ref 0–0.6)
EOSINOPHILS RELATIVE PERCENT: 4.3 %
GFR SERPL CREATININE-BSD FRML MDRD: >60 ML/MIN/{1.73_M2}
GLUCOSE BLD-MCNC: 153 MG/DL (ref 70–99)
HCT VFR BLD CALC: 41.6 % (ref 40.5–52.5)
HDLC SERPL-MCNC: 36 MG/DL (ref 40–60)
HEMOGLOBIN: 14.6 G/DL (ref 13.5–17.5)
LDL CHOLESTEROL CALCULATED: 105 MG/DL
LYMPHOCYTES ABSOLUTE: 1.3 K/UL (ref 1–5.1)
LYMPHOCYTES RELATIVE PERCENT: 24.8 %
MCH RBC QN AUTO: 30.5 PG (ref 26–34)
MCHC RBC AUTO-ENTMCNC: 35.1 G/DL (ref 31–36)
MCV RBC AUTO: 86.9 FL (ref 80–100)
MONOCYTES ABSOLUTE: 0.4 K/UL (ref 0–1.3)
MONOCYTES RELATIVE PERCENT: 8.5 %
NEUTROPHILS ABSOLUTE: 3.1 K/UL (ref 1.7–7.7)
NEUTROPHILS RELATIVE PERCENT: 61.2 %
PDW BLD-RTO: 13.1 % (ref 12.4–15.4)
PLATELET # BLD: 150 K/UL (ref 135–450)
PMV BLD AUTO: 9.3 FL (ref 5–10.5)
POTASSIUM SERPL-SCNC: 4.4 MMOL/L (ref 3.5–5.1)
PROSTATE SPECIFIC ANTIGEN: 0.33 NG/ML (ref 0–4)
RBC # BLD: 4.79 M/UL (ref 4.2–5.9)
SODIUM BLD-SCNC: 141 MMOL/L (ref 136–145)
TOTAL PROTEIN: 7.2 G/DL (ref 6.4–8.2)
TRIGL SERPL-MCNC: 167 MG/DL (ref 0–150)
VLDLC SERPL CALC-MCNC: 33 MG/DL
WBC # BLD: 5.1 K/UL (ref 4–11)

## 2022-10-27 PROCEDURE — 84270 ASSAY OF SEX HORMONE GLOBUL: CPT

## 2022-10-27 PROCEDURE — 80053 COMPREHEN METABOLIC PANEL: CPT

## 2022-10-27 PROCEDURE — 36415 COLL VENOUS BLD VENIPUNCTURE: CPT

## 2022-10-27 PROCEDURE — 84153 ASSAY OF PSA TOTAL: CPT

## 2022-10-27 PROCEDURE — 85025 COMPLETE CBC W/AUTO DIFF WBC: CPT

## 2022-10-27 PROCEDURE — 84403 ASSAY OF TOTAL TESTOSTERONE: CPT

## 2022-10-27 PROCEDURE — 80061 LIPID PANEL: CPT

## 2022-10-29 LAB
SEX HORMONE BINDING GLOBULIN: 154 NMOL/L (ref 11–80)
TESTOSTERONE FREE-NONMALE: 46.2 PG/ML (ref 47–244)
TESTOSTERONE TOTAL: 721 NG/DL (ref 220–1000)

## 2022-11-03 ENCOUNTER — OFFICE VISIT (OUTPATIENT)
Dept: ENDOCRINOLOGY | Age: 57
End: 2022-11-03
Payer: COMMERCIAL

## 2022-11-03 VITALS
WEIGHT: 225.4 LBS | SYSTOLIC BLOOD PRESSURE: 123 MMHG | HEIGHT: 71 IN | OXYGEN SATURATION: 98 % | HEART RATE: 90 BPM | DIASTOLIC BLOOD PRESSURE: 80 MMHG | BODY MASS INDEX: 31.56 KG/M2

## 2022-11-03 DIAGNOSIS — N46.9 INFERTILITY MALE: ICD-10-CM

## 2022-11-03 DIAGNOSIS — E23.6 RATHKE'S CLEFT CYST (HCC): ICD-10-CM

## 2022-11-03 DIAGNOSIS — E29.1 HYPOGONADISM MALE: Primary | ICD-10-CM

## 2022-11-03 PROCEDURE — 3078F DIAST BP <80 MM HG: CPT | Performed by: INTERNAL MEDICINE

## 2022-11-03 PROCEDURE — 3074F SYST BP LT 130 MM HG: CPT | Performed by: INTERNAL MEDICINE

## 2022-11-03 PROCEDURE — 99214 OFFICE O/P EST MOD 30 MIN: CPT | Performed by: INTERNAL MEDICINE

## 2022-11-03 RX ORDER — TESTOSTERONE 16.2 MG/G
2 GEL TRANSDERMAL DAILY
Qty: 75 G | Refills: 3 | Status: SHIPPED | OUTPATIENT
Start: 2022-11-03 | End: 2023-01-16

## 2022-11-03 NOTE — PROGRESS NOTES
Patient ID:   Julianna Davis is a 61 y.o. male    Chief Complaint:   Julianna Davis is seen for an evaluation of  hypogonadism. Subjective:   Lizeth Valdes had MRI brain showing pituitary tumor in Oct 2021. There is a nonenhancing cystic lesion within the sella extending into the suprasellar cistern measuring approximately 11 x 9 x 14 mm (AP x TRANS x CC). This appears to contact the optic chiasm without definitive elevation. 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      MRI also showed somewhat lobulated T2 hyperintense lesion within the left parietal lobe with associated susceptibility. This is most compatible with a cavernoma. There is no surrounding edema. He saw Dr. Brina Espinoza. He is been told to have small aneurysm. VF normal - Dec 2021      TT 21, [facility] low normal - Oct 2021   IGF slightly low at 44, ACTH, prolactin, Am cortisol, TSH, T4, Free T3 - Oct 2021     Denies head traumas   Fathered two children, doing well   Does not smoke     Denies drug abuse, opioids, steroids      DM, HTN and metabolic diseases as per pcp     Interval history:      Clomid failed     Started Androgel 1.6% , 2 actuations daily   Taking every day     He has headaches started Sep 2021. No change    Libido 5-6 < 3/10  He is not sexually active with his wife. performance was low due to ED. He thinks erections are okay. Wife thought he had issues.    Energy levels are same , low   Has some morning erections now   No issues in concentration    Sleeping well   Shaves once or twice a week   Denies muscle weakness   Denies depression or anxiety   Denies nipple discharge or gynecomastia      The following portions of the patient's history were reviewed and updated as appropriate:      Family History   Problem Relation Age of Onset    Heart Attack Father 54    Diabetes Brother     Cancer Paternal Grandmother            Social History     Socioeconomic History    Marital status:      Spouse name: Not on file    Number of children: Not on file    Years of education: Not on file    Highest education level: Not on file   Occupational History    Not on file   Tobacco Use    Smoking status: Never    Smokeless tobacco: Never   Vaping Use    Vaping Use: Never used   Substance and Sexual Activity    Alcohol use: Not Currently     Alcohol/week: 6.0 standard drinks     Types: 6 Standard drinks or equivalent per week    Drug use: No    Sexual activity: Yes     Partners: Female   Other Topics Concern    Not on file   Social History Narrative    Not on file     Social Determinants of Health     Financial Resource Strain: Low Risk     Difficulty of Paying Living Expenses: Not hard at all   Food Insecurity: No Food Insecurity    Worried About Running Out of Food in the Last Year: Never true    Ran Out of Food in the Last Year: Never true   Transportation Needs: Not on file   Physical Activity: Not on file   Stress: Not on file   Social Connections: Not on file   Intimate Partner Violence: Not on file   Housing Stability: Not on file           Past Medical History:   Diagnosis Date    HTN (hypertension)     Lumbar disc herniation 2004, 2007    open worker's comp claim    Other and unspecified hyperlipidemia     Type II or unspecified type diabetes mellitus without mention of complication, not stated as uncontrolled 11/13         Past Surgical History:   Procedure Laterality Date    LUMBAR One [address] SURGERY  08/2004, 02/2007    L4-L5 (twice), [place], hardware in place           Allergies   Allergen Reactions    Lisinopril      Dry cough         Current Outpatient Medications:     metFORMIN (GLUCOPHAGE-XR) 500 MG extended release tablet, Take 2 tablets by mouth daily (with breakfast), Disp: 60 tablet, Rfl: 5    FARXIGA 10 MG tablet, TAKE ONE TABLET BY MOUTH EVERY MORNING, Disp: 30 tablet, Rfl: 3    losartan (COZAAR) 25 MG tablet, TAKE ONE TABLET BY MOUTH DAILY, Disp: 30 tablet, Rfl: 3    glipiZIDE (GLUCOTROL XL) 10 MG extended release tablet, TAKE ONE TABLET BY MOUTH DAILY, Disp: 30 tablet, Rfl: 3    Testosterone (ANDROGEL) 20.25 MG/ACT (1.62%) GEL gel, Place 2 actuation onto the skin in the morning for 74 days. , Disp: 75 g, Rfl: 2    fenofibrate (TRIGLIDE) 160 MG tablet, TAKE ONE TABLET BY MOUTH DAILY, Disp: 90 tablet, Rfl: 3    atorvastatin (LIPITOR) 20 MG tablet, TAKE ONE TABLET BY MOUTH ONCE NIGHTLY, Disp: 90 tablet, Rfl: 3    topiramate (TOPAMAX) 50 MG tablet, Take 50 mg by mouth 2 times daily , Disp: , Rfl:     KR[employer]ER LANCETS MISC, 1 each by Does not apply route 3 times daily, Disp: 100 each, Rfl: 3    blood glucose test strips ([employer]ER BLOOD GLUCOSE TEST) strip, 1 each by In Vitro route 3 times daily As needed. , Disp: 100 each, Rfl: 3    Multiple Vitamins-Minerals (CENTRUM SILVER PO), Take  by mouth., Disp: , Rfl:     vitamin D (CHOLECALCIFEROL) 1000 UNIT TABS tablet, Take 1,000 Units by mouth daily. , Disp: , Rfl:     aspirin 81 MG tablet, Take 81 mg by mouth daily. , Disp: , Rfl:     Review of Systems:    Constitutional: Negative for fever, chills, and unexpected weight change. HENT: Negative for congestion, ear pain, rhinorrhea,  sore throat and trouble swallowing. Eyes: Negative for photophobia, redness, itching. Respiratory: Negative for cough, shortness of breath and sputum. Cardiovascular: Negative for chest pain, palpitations and leg swelling. Gastrointestinal: Negative for nausea, vomiting, abdominal pain, diarrhea, constipation. Endocrine: Negative for cold intolerance, heat intolerance, polydipsia, polyphagia and polyuria. Genitourinary: Negative for dysuria, urgency, frequency, hematuria and flank pain. Musculoskeletal: Negative for myalgias, back pain, arthralgias and neck pain. Skin/Nail: Negative for rash, itching. Normal nails. Neurological: Negative for seizures, weakness, light-headedness, numbness and headaches. Hematological/ Lymph nodes: Negative for adenopathy. Does not bruise/bleed easily. Psychiatric/Behavioral: Negative for suicidal ideas, depression, anxiety, sleep disturbance and decreased concentration. Objective:   Physical Exam:    There were no vitals taken for this visit. Constitutional: Patient is oriented to person, place, and time. Patient appears well-developed and well-nourished. HENT:    Head: Normocephalic and atraumatic. Eyes: Conjunctivae and EOM are normal.      Neck: Normal range of motion. Cardiovascular: Normal rate, regular rhythm and normal heart sounds. Pulmonary/Chest: Effort normal and breath sounds normal.    Musculoskeletal: Normal range of motion. Neurological: Patient is alert and oriented to person, place, and time. Patient has normal reflexes. Skin: Skin is warm and dry. Psychiatric: Patient has a normal mood and affect.  Patient behavior is normal.     Lab Review:      Hospital Outpatient Visit on 10/27/2022   Component Date Value Ref Range Status    PSA 10/27/2022 0.33  0.00 - 4.00 ng/mL Final    Testosterone 10/27/2022 721  220 - 1,000 ng/dL Final    Sex Hormone Binding 10/27/2022 154 (A)  11 - 80 nmol/L Final    Testosterone, Free 10/27/2022 46.2 (A)  47 - 244 pg/mL Final    Sodium 10/27/2022 141  136 - 145 mmol/L Final    Potassium 10/27/2022 4.4  3.5 - 5.1 mmol/L Final    Chloride 10/27/2022 107  99 - 110 mmol/L Final    CO2 10/27/2022 20 (A)  21 - 32 mmol/L Final    Anion Gap 10/27/2022 14  3 - 16 Final    Glucose 10/27/2022 153 (A)  70 - 99 mg/dL Final    BUN 10/27/2022 16  7 - 20 mg/dL Final    Creatinine 10/27/2022 1.1  0.9 - 1.3 mg/dL Final    Est, Glom Filt Rate 10/27/2022 >60  >60 Final    Calcium 10/27/2022 9.9  8.3 - 10.6 mg/dL Final    Total Protein 10/27/2022 7.2  6.4 - 8.2 g/dL Final    Albumin 10/27/2022 4.9  3.4 - 5.0 g/dL Final    Albumin/Globulin Ratio 10/27/2022 2.1  1.1 - 2.2 Final    Total Bilirubin 10/27/2022 0.5  0.0 - 1.0 mg/dL Final    Alkaline Phosphatase 10/27/2022 58  40 - 129 U/L Final    ALT 10/27/2022 34  10 - 40 U/L Final    AST 10/27/2022 25  15 - 37 U/L Final    WBC 10/27/2022 5.1  4.0 - 11.0 K/uL Final    RBC 10/27/2022 4.79  4.20 - 5.90 M/uL Final    Hemoglobin 10/27/2022 14.6  13.5 - 17.5 g/dL Final    Hematocrit 10/27/2022 41.6  40.5 - 52.5 % Final    MCV 10/27/2022 86.9  80.0 - 100.0 fL Final    MCH 10/27/2022 30.5  26.0 - 34.0 pg Final    MCHC 10/27/2022 35.1  31.0 - 36.0 g/dL Final    RDW 10/27/2022 13.1  12.4 - 15.4 % Final    Platelets 83/57/4666 150  135 - 450 K/uL Final    MPV 10/27/2022 9.3  5.0 - 10.5 fL Final    Neutrophils % 10/27/2022 61.2  % Final    Lymphocytes % 10/27/2022 24.8  % Final    Monocytes % 10/27/2022 8.5  % Final    Eosinophils % 10/27/2022 4.3  % Final    Basophils % 10/27/2022 1.2  % Final    Neutrophils Absolute 10/27/2022 3.1  1.7 - 7.7 K/uL Final    Lymphocytes Absolute 10/27/2022 1.3  1.0 - 5.1 K/uL Final    Monocytes Absolute 10/27/2022 0.4  0.0 - 1.3 K/uL Final    Eosinophils Absolute 10/27/2022 0.2  0.0 - 0.6 K/uL Final    Basophils Absolute 10/27/2022 0.1  0.0 - 0.2 K/uL Final    Cholesterol, Total 10/27/2022 174  0 - 199 mg/dL Final    Triglycerides 10/27/2022 167 (A)  0 - 150 mg/dL Final    HDL 10/27/2022 36 (A)  40 - 60 mg/dL Final    LDL Calculated 10/27/2022 105 (A)  <100 mg/dL Final    VLDL Cholesterol Calculated 10/27/2022 33  Not Established mg/dL Final   Office Visit on 08/15/2022   Component Date Value Ref Range Status    Hemoglobin A1C 08/15/2022 5.1  % Final   Hospital Outpatient Visit on 07/26/2022   Component Date Value Ref Range Status    Testosterone 07/26/2022 245  220 - 1,000 ng/dL Final    Sex Hormone Binding 07/26/2022 202 (A)  11 - 80 nmol/L Final    Testosterone, Free 07/26/2022 SEE BELOW  47 - 244 pg/mL Final    Sodium 07/26/2022 142  136 - 145 mmol/L Final    Potassium 07/26/2022 4.8  3.5 - 5.1 mmol/L Final    Chloride 07/26/2022 111 (A)  99 - 110 mmol/L Final    CO2 07/26/2022 20 (A)  21 - 32 mmol/L Final    Anion Gap 07/26/2022 11  3 - 16 Final Glucose 07/26/2022 110 (A)  70 - 99 mg/dL Final    BUN 07/26/2022 19  7 - 20 mg/dL Final    Creatinine 07/26/2022 1.3  0.9 - 1.3 mg/dL Final    GFR Non- 07/26/2022 57 (A)  >60 Final    GFR  07/26/2022 >60  >60 Final    Calcium 07/26/2022 9.4  8.3 - 10.6 mg/dL Final    Total Protein 07/26/2022 6.3 (A)  6.4 - 8.2 g/dL Final    Albumin 07/26/2022 4.2  3.4 - 5.0 g/dL Final    Albumin/Globulin Ratio 07/26/2022 2.0  1.1 - 2.2 Final    Total Bilirubin 07/26/2022 0.4  0.0 - 1.0 mg/dL Final    Alkaline Phosphatase 07/26/2022 54  40 - 129 U/L Final    ALT 07/26/2022 22  10 - 40 U/L Final    AST 07/26/2022 24  15 - 37 U/L Final    WBC 07/26/2022 4.9  4.0 - 11.0 K/uL Final    RBC 07/26/2022 4.47  4.20 - 5.90 M/uL Final    Hemoglobin 07/26/2022 13.6  13.5 - 17.5 g/dL Final    Hematocrit 07/26/2022 40.1 (A)  40.5 - 52.5 % Final    MCV 07/26/2022 89.7  80.0 - 100.0 fL Final    MCH 07/26/2022 30.4  26.0 - 34.0 pg Final    MCHC 07/26/2022 33.9  31.0 - 36.0 g/dL Final    RDW 07/26/2022 13.3  12.4 - 15.4 % Final    Platelets 50/94/8328 126 (A)  135 - 450 K/uL Final    MPV 07/26/2022 9.4  5.0 - 10.5 fL Final    Neutrophils % 07/26/2022 56.5  % Final    Lymphocytes % 07/26/2022 30.4  % Final    Monocytes % 07/26/2022 8.9  % Final    Eosinophils % 07/26/2022 3.5  % Final    Basophils % 07/26/2022 0.7  % Final    Neutrophils Absolute 07/26/2022 2.8  1.7 - 7.7 K/uL Final    Lymphocytes Absolute 07/26/2022 1.5  1.0 - 5.1 K/uL Final    Monocytes Absolute 07/26/2022 0.4  0.0 - 1.3 K/uL Final    Eosinophils Absolute 07/26/2022 0.2  0.0 - 0.6 K/uL Final    Basophils Absolute 07/26/2022 0.0  0.0 - 0.2 K/uL Final   Hospital Outpatient Visit on 05/23/2022   Component Date Value Ref Range Status    WBC 05/23/2022 4.6  4.0 - 11.0 K/uL Final    RBC 05/23/2022 4.52  4.20 - 5.90 M/uL Final    Hemoglobin 05/23/2022 13.6  13.5 - 17.5 g/dL Final    Hematocrit 05/23/2022 40.4 (A)  40.5 - 52.5 % Final    MCV 05/23/2022 89.3 80.0 - 100.0 fL Final    MCH 05/23/2022 30.1  26.0 - 34.0 pg Final    MCHC 05/23/2022 33.7  31.0 - 36.0 g/dL Final    RDW 05/23/2022 14.2  12.4 - 15.4 % Final    Platelets 31/69/9689 147  135 - 450 K/uL Final    MPV 05/23/2022 8.9  5.0 - 10.5 fL Final    Neutrophils % 05/23/2022 56.3  % Final    Lymphocytes % 05/23/2022 28.3  % Final    Monocytes % 05/23/2022 10.1  % Final    Eosinophils % 05/23/2022 4.2  % Final    Basophils % 05/23/2022 1.1  % Final    Neutrophils Absolute 05/23/2022 2.6  1.7 - 7.7 K/uL Final    Lymphocytes Absolute 05/23/2022 1.3  1.0 - 5.1 K/uL Final    Monocytes Absolute 05/23/2022 0.5  0.0 - 1.3 K/uL Final    Eosinophils Absolute 05/23/2022 0.2  0.0 - 0.6 K/uL Final    Basophils Absolute 05/23/2022 0.0  0.0 - 0.2 K/uL Final    Sodium 05/23/2022 141  136 - 145 mmol/L Final    Potassium 05/23/2022 3.8  3.5 - 5.1 mmol/L Final    Chloride 05/23/2022 109  99 - 110 mmol/L Final    CO2 05/23/2022 18 (A)  21 - 32 mmol/L Final    Anion Gap 05/23/2022 14  3 - 16 Final    Glucose 05/23/2022 94  70 - 99 mg/dL Final    BUN 05/23/2022 16  7 - 20 mg/dL Final    Creatinine 05/23/2022 1.1  0.9 - 1.3 mg/dL Final    GFR Non- 05/23/2022 >60  >60 Final    GFR  05/23/2022 >60  >60 Final    Calcium 05/23/2022 9.6  8.3 - 10.6 mg/dL Final    Total Protein 05/23/2022 6.9  6.4 - 8.2 g/dL Final    Albumin 05/23/2022 4.8  3.4 - 5.0 g/dL Final    Albumin/Globulin Ratio 05/23/2022 2.3 (A)  1.1 - 2.2 Final    Total Bilirubin 05/23/2022 0.5  0.0 - 1.0 mg/dL Final    Alkaline Phosphatase 05/23/2022 55  40 - 129 U/L Final    ALT 05/23/2022 30  10 - 40 U/L Final    AST 05/23/2022 29  15 - 37 U/L Final    Testosterone 05/23/2022 254  220 - 1,000 ng/dL Final    Sex Hormone Binding 05/23/2022 168 (A)  11 - 80 nmol/L Final    Testosterone, Free 05/23/2022 13.8 (A)  47 - 244 pg/mL Final   Hospital Outpatient Visit on 05/02/2022   Component Date Value Ref Range Status    BUN 05/02/2022 20  7 - 20 mg/dL Final    Creatinine 05/02/2022 1.3  0.9 - 1.3 mg/dL Final    GFR Non- 05/02/2022 57 (A)  >60 Final    GFR  05/02/2022 >60  >60 Final   Office Visit on 02/14/2022   Component Date Value Ref Range Status    Hemoglobin A1C 02/14/2022 5.2  % Final           Assessment and Plan       There are no diagnoses linked to this encounter. 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 Bradley Covert     MRI June 2022: cyst collaped. Next MRI in Dec 2022        2: Male hypogonadism       - Oct 2022   Will target -1000     Free T is low, will keep checking it     PSA 0.33 - Oct 2022     C/w Androgel 1.6%, 2 actuations daily      The FDA is updating the labels for all prescription testosterone products to warn of their potential for abuse and dependence. Adverse effects linked to testosterone abuse include myocardial infarction, heart failure, stroke, male infertility, depression,  Aggression,  blood clots, prostate hyperplasia/cancer, sleep apnea, acne, and breast enlargement. Men who use a testosterone gel should wash their hands thoroughly after applying a dose and make sure that no one else touches the spots where they medicate. If a woman or child comes into contact with testosterone gels, it can cause side effects in them, including hair growth and premature puberty. Testosterone abuse often occurs with other anabolic androgenic steroids and at higher doses than what is prescribed. Withdrawal symptoms have included fatigue, irritability, loss of appetite, reduced libido, and insomnia.      4: ED   If it continues after nomral T levels then he will need a Urology evaluation        RTC in 4 months with CBC, CMP, TT , estrogens        Electronically signed by Jacob Menon MD on 11/3/2022 at 3:50 PM

## 2022-11-16 ENCOUNTER — OFFICE VISIT (OUTPATIENT)
Dept: FAMILY MEDICINE CLINIC | Age: 57
End: 2022-11-16
Payer: COMMERCIAL

## 2022-11-16 VITALS
WEIGHT: 225 LBS | BODY MASS INDEX: 31.5 KG/M2 | OXYGEN SATURATION: 100 % | DIASTOLIC BLOOD PRESSURE: 76 MMHG | HEIGHT: 71 IN | HEART RATE: 68 BPM | SYSTOLIC BLOOD PRESSURE: 136 MMHG

## 2022-11-16 DIAGNOSIS — E11.9 TYPE 2 DIABETES MELLITUS WITHOUT COMPLICATION, WITHOUT LONG-TERM CURRENT USE OF INSULIN (HCC): Primary | ICD-10-CM

## 2022-11-16 DIAGNOSIS — E23.6 RATHKE'S CLEFT CYST (HCC): ICD-10-CM

## 2022-11-16 DIAGNOSIS — E29.1 HYPOGONADISM MALE: ICD-10-CM

## 2022-11-16 DIAGNOSIS — I15.2 HYPERTENSION DUE TO ENDOCRINE DISORDER: ICD-10-CM

## 2022-11-16 DIAGNOSIS — E78.1 HYPERTRIGLYCERIDEMIA: ICD-10-CM

## 2022-11-16 PROCEDURE — 99214 OFFICE O/P EST MOD 30 MIN: CPT | Performed by: FAMILY MEDICINE

## 2022-11-16 PROCEDURE — 3078F DIAST BP <80 MM HG: CPT | Performed by: FAMILY MEDICINE

## 2022-11-16 PROCEDURE — 3044F HG A1C LEVEL LT 7.0%: CPT | Performed by: FAMILY MEDICINE

## 2022-11-16 PROCEDURE — 3074F SYST BP LT 130 MM HG: CPT | Performed by: FAMILY MEDICINE

## 2022-11-16 NOTE — PROGRESS NOTES
Zari Shankar is a 61 y.o. male. HPI:  Diabetes Mellitus Type II, Follow-up--   Lab Results   Component Value Date    LABA1C 5.1 08/15/2022      Checks sugars at home:Yes. fasting range: 90s . Last Eye Exam was over a year ago. Pt is on ACEI or ARB. Pt denies foot ulcerations and paresthesia of the feet. pt does adhere to a low carb diet. HTN--Pt seen here for follow up regarding HTN. BP checks at home:No  Pt denies blurred vision, chest pain, palpitations, and peripheral edema. Pt complains of none. Tolerating medications: Yes. Pt does not exercise regularly and does adhere to a low salt diet. Hyperlipidemia:    Lab Results   Component Value Date    LDLCALC 105 (H) 10/27/2022   . He does not have myalgias from medication. Pt is  following Lifestyle modification including Low fat/low cholesterol diet, low carbohydrate diet, and does not exercise regularly. Had MRI brain showing pituitary tumor in Oct 2021. There is a nonenhancing cystic lesion within the sella extending into the suprasellar cistern measuring approximately 11 x 9 x 14 mm (AP x TRANS x CC). This appears to contact the optic chiasm without definitive elevation. 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   Seeing endo and had normal functional evaluation in Oct 2021. Seeing endo for hypogonadism. In on androgel 1.62%, 2 actuations daily. PSA being monitored        Current Outpatient Medications   Medication Sig Dispense Refill    Testosterone (ANDROGEL) 20.25 MG/ACT (1.62%) GEL gel Place 2 actuation onto the skin daily for 74 days.  75 g 3    metFORMIN (GLUCOPHAGE-XR) 500 MG extended release tablet Take 2 tablets by mouth daily (with breakfast) 60 tablet 5    FARXIGA 10 MG tablet TAKE ONE TABLET BY MOUTH EVERY MORNING 30 tablet 3    losartan (COZAAR) 25 MG tablet TAKE ONE TABLET BY MOUTH DAILY 30 tablet 3    glipiZIDE (GLUCOTROL XL) 10 MG extended release tablet TAKE ONE TABLET BY MOUTH DAILY 30 tablet 3    fenofibrate (TRIGLIDE) 160 MG tablet TAKE ONE TABLET BY MOUTH DAILY 90 tablet 3    atorvastatin (LIPITOR) 20 MG tablet TAKE ONE TABLET BY MOUTH ONCE NIGHTLY 90 tablet 3    topiramate (TOPAMAX) 50 MG tablet Take 50 mg by mouth 2 times daily       [employer] LANCETS MISC 1 each by Does not apply route 3 times daily 100 each 3    blood glucose test strips ([employer] BLOOD GLUCOSE TEST) strip 1 each by In Vitro route 3 times daily As needed. 100 each 3    Multiple Vitamins-Minerals (CENTRUM SILVER PO) Take  by mouth.      vitamin D (CHOLECALCIFEROL) 1000 UNIT TABS tablet Take 1,000 Units by mouth daily. aspirin 81 MG tablet Take 81 mg by mouth daily. No current facility-administered medications for this visit. Health Maintenance   Topic Date Due    COVID-19 Vaccine (1) Never done    Diabetic retinal exam  10/18/2018    Diabetic microalbuminuria test  02/20/2021    Flu vaccine (1) Never done    Hepatitis B vaccine (1 of 3 - Risk 3-dose series) 02/14/2023 (Originally 6/6/1984)    Shingles vaccine (1 of 2) 02/14/2023 (Originally 6/6/2014)    Pneumococcal 0-64 years Vaccine (1 - PCV) 11/29/2023 (Originally 6/6/1971)    Colorectal Cancer Screen  01/29/2023    Diabetic foot exam  08/15/2023    A1C test (Diabetic or Prediabetic)  08/15/2023    Depression Screen  08/15/2023    Lipids  10/27/2023    DTaP/Tdap/Td vaccine (2 - Td or Tdap) 12/17/2023    Hepatitis C screen  Completed    Hepatitis A vaccine  Aged Out    Hib vaccine  Aged Out    Meningococcal (ACWY) vaccine  Aged Out    HIV screen  Discontinued       I have reviewed the patient's medical/surgical/family/social in detail and updated the computerized patient record as appropriate.     Past Medical History:   Diagnosis Date    HTN (hypertension)     Lumbar disc herniation 2004, 2007    open worker's comp claim    Other and unspecified hyperlipidemia     Type II or unspecified type diabetes mellitus without mention of complication, not stated as uncontrolled 11/13     Past Surgical History:   Procedure Laterality Date    LUMBAR One [address] SURGERY  08/2004, 02/2007    L4-L5 (twice), [place], hardware in place     Family History   Problem Relation Age of Onset    Heart Attack Father 54    Diabetes Brother     Cancer Paternal Grandmother      Social History     Socioeconomic History    Marital status:      Spouse name: Not on file    Number of children: Not on file    Years of education: Not on file    Highest education level: Not on file   Occupational History    Not on file   Tobacco Use    Smoking status: Never    Smokeless tobacco: Never   Vaping Use    Vaping Use: Never used   Substance and Sexual Activity    Alcohol use: Not Currently     Alcohol/week: 6.0 standard drinks     Types: 6 Standard drinks or equivalent per week    Drug use: No    Sexual activity: Yes     Partners: Female   Other Topics Concern    Not on file   Social History Narrative    Not on file     Social Determinants of Health     Financial Resource Strain: Low Risk     Difficulty of Paying Living Expenses: Not hard at all   Food Insecurity: No Food Insecurity    Worried About Running Out of Food in the Last Year: Never true    Ran Out of Food in the Last Year: Never true   Transportation Needs: Not on file   Physical Activity: Not on file   Stress: Not on file   Social Connections: Not on file   Intimate Partner Violence: Not on file   Housing Stability: Not on file         ROS:  Gen:  Denies fever, chills, headaches. HEENT:  Denies cold symptoms, sore throat. CV:  Denies chest pain or tightness, palpitations. Pulm:  Denies shortness of breath, cough. Abd:  Denies abdominal pain, change in bowel habits. I have reviewed the patient's medical history in detail and updated the computerized patient record as appropriate.      OBJECTIVE:  /76 (Site: Right Upper Arm, Position: Sitting, Cuff Size: Medium Adult)   Pulse 68   Ht 5' 11\" (1.803 m)   Wt 225 lb (102.1 kg)   SpO2 100%   BMI 31.38 kg/m² GEN:  WDWN, NAD  HEENT:  NCAT, TM/OP nl, PERRL, EOMI. NECK:  Supple without adenopathy. CV:  Regular rate and rhythm, S1 and S2 normal, no murmurs, clicks, gallops or rubs. No edema. PULM:  Chest is clear, no wheezing or rales. Normal symmetric air entry throughout both lung fields. ABD: soft, Non-tender, non-distended, normal bowel sounds. No organomegaly     ASSESSMENT/PLAN:  1. Type 2 diabetes mellitus without complication, without long-term current use of insulin (HCC)  Stable. Continue current medications  Continue to monitor home glucose readings     2. Hypertension due to endocrine disorder  Stable. Start home monitoring  Continue current medications     3. Hypertriglyceridemia  Continue statin    4. Hypogonadism male  Symptoms improved  Continue current treatment, follow up with endo as scheduled     5. Rathke's cleft cyst ([address]  Stable. Discussed the importance of a low carb diet with regular cardiovascular exercise. Patient was given educational handouts regarding proper low carb/low calorie diet. Skin normal color for race, warm, dry and intact. No evidence of rash.

## 2022-11-29 ENCOUNTER — HOSPITAL ENCOUNTER (OUTPATIENT)
Age: 57
Discharge: HOME | End: 2022-11-29
Payer: COMMERCIAL

## 2022-11-29 LAB
BUN BLDV-MCNC: 16 MG/DL (ref 7–20)
CREAT SERPL-MCNC: 1.2 MG/DL (ref 0.9–1.3)
GFR SERPL CREATININE-BSD FRML MDRD: >60 ML/MIN/{1.73_M2}

## 2022-11-29 PROCEDURE — 36415 COLL VENOUS BLD VENIPUNCTURE: CPT

## 2022-11-29 PROCEDURE — 82565 ASSAY OF CREATININE: CPT

## 2022-11-29 PROCEDURE — 84520 ASSAY OF UREA NITROGEN: CPT

## 2022-11-30 DIAGNOSIS — E11.9 TYPE 2 DIABETES MELLITUS WITHOUT COMPLICATION, WITHOUT LONG-TERM CURRENT USE OF INSULIN (HCC): ICD-10-CM

## 2022-11-30 RX ORDER — DAPAGLIFLOZIN 10 MG/1
TABLET, FILM COATED ORAL
Qty: 30 TABLET | Refills: 3 | Status: SHIPPED | OUTPATIENT
Start: 2022-11-30

## 2022-11-30 RX ORDER — LOSARTAN POTASSIUM 25 MG/1
TABLET ORAL
Qty: 30 TABLET | Refills: 3 | Status: SHIPPED | OUTPATIENT
Start: 2022-11-30

## 2022-11-30 RX ORDER — GLIPIZIDE 10 MG/1
TABLET, FILM COATED, EXTENDED RELEASE ORAL
Qty: 30 TABLET | Refills: 3 | Status: SHIPPED | OUTPATIENT
Start: 2022-11-30

## 2022-11-30 NOTE — TELEPHONE ENCOUNTER
Medication:   Requested Prescriptions     Pending Prescriptions Disp Refills    FARXIGA 10 MG tablet [Pharmacy Med Name: FARXIGA 10 MG TABLET] 30 tablet 3     Sig: TAKE ONE TABLET BY MOUTH EVERY MORNING    losartan (COZAAR) 25 MG tablet [Pharmacy Med Name: LOSARTAN POTASSIUM 25 MG TAB] 30 tablet 3     Sig: TAKE ONE TABLET BY MOUTH DAILY    glipiZIDE (GLUCOTROL XL) 10 MG extended release tablet [Pharmacy Med Name: glipiZIDE XL 10 MG TABLET] 30 tablet 3     Sig: TAKE ONE TABLET BY MOUTH DAILY        Last Filled:  7/29/2022 30 tabs 3 refills     Patient Phone Number: [phone] (home)     Last appt: 11/16/2022   Next appt: 5/17/2023    Last [place]AR[place]:   RX Monitoring 7/28/2022   Periodic Controlled Substance Monitoring No signs of potential drug abuse or diversion identified.

## 2023-02-06 RX ORDER — TOPIRAMATE 50 MG/1
50 TABLET, FILM COATED ORAL 2 TIMES DAILY
Qty: 60 TABLET | Refills: 0 | Status: SHIPPED | OUTPATIENT
Start: 2023-02-06

## 2023-03-06 RX ORDER — TOPIRAMATE 50 MG/1
TABLET, FILM COATED ORAL
Qty: 60 TABLET | Refills: 0 | Status: SHIPPED | OUTPATIENT
Start: 2023-03-06

## 2023-03-06 NOTE — TELEPHONE ENCOUNTER
Medication:   Requested Prescriptions     Pending Prescriptions Disp Refills    topiramate (TOPAMAX) 50 MG tablet [Pharmacy Med Name: TOPIRAMATE 50 MG TABLET] 60 tablet 0     Sig: TAKE ONE TABLET BY MOUTH TWICE A DAY        Last Filled:  2/6/2023 60 tabs 0 refills     Patient Phone Number: [phone] (home)     Last appt: 11/16/2022   Next appt: 5/17/2023    Last OARRS:   RX Monitoring 7/28/2022   Periodic Controlled Substance Monitoring No signs of potential drug abuse or diversion identified.

## 2023-03-13 DIAGNOSIS — E11.9 TYPE 2 DIABETES MELLITUS WITHOUT COMPLICATION, WITHOUT LONG-TERM CURRENT USE OF INSULIN (HCC): ICD-10-CM

## 2023-03-13 RX ORDER — METFORMIN HYDROCHLORIDE 500 MG/1
TABLET, EXTENDED RELEASE ORAL
Qty: 60 TABLET | Refills: 5 | Status: SHIPPED | OUTPATIENT
Start: 2023-03-13

## 2023-03-13 NOTE — TELEPHONE ENCOUNTER
Medication:   Requested Prescriptions     Pending Prescriptions Disp Refills    metFORMIN (GLUCOPHAGE-XR) 500 MG extended release tablet [Pharmacy Med Name: METFORMIN HCL  MG TABLET] 60 tablet 5     Sig: TAKE TWO TABLETS BY MOUTH DAILY WITH BREAKFAST      8/15/2022 60 tabs 5 refills   Last Filled:      Patient Phone Number: [phone] (home)     Last appt: 11/16/2022   Next appt: 5/17/2023    Last OARRS:   RX Monitoring 7/28/2022   Periodic Controlled Substance Monitoring No signs of potential drug abuse or diversion identified.

## 2023-03-15 DIAGNOSIS — E11.9 CONTROLLED TYPE 2 DIABETES MELLITUS WITHOUT COMPLICATION, WITHOUT LONG-TERM CURRENT USE OF INSULIN (HCC): ICD-10-CM

## 2023-03-15 DIAGNOSIS — E78.1 HYPERTRIGLYCERIDEMIA: ICD-10-CM

## 2023-03-15 RX ORDER — ATORVASTATIN CALCIUM 20 MG/1
TABLET, FILM COATED ORAL
Qty: 90 TABLET | Refills: 3 | Status: SHIPPED | OUTPATIENT
Start: 2023-03-15

## 2023-03-15 NOTE — TELEPHONE ENCOUNTER
Medication:   Requested Prescriptions     Pending Prescriptions Disp Refills    atorvastatin (LIPITOR) 20 MG tablet 90 tablet 3     Sig: TAKE ONE TABLET BY MOUTH ONCE NIGHTLY       Last Filled:  03/11/2022 #90 3rf    Patient Phone Number: [phone] (home)     Last appt: 11/16/2022   Next appt: 5/17/2023    Last Lipid:   Lab Results   Component Value Date/Time    CHOL 174 10/27/2022 07:24 AM    TRIG 167 10/27/2022 07:24 AM    HDL 36 10/27/2022 07:24 AM    HDL 38 02/21/2011 09:52 AM    LDLCALC 105 10/27/2022 07:24 AM

## 2023-04-17 ENCOUNTER — HOSPITAL ENCOUNTER (OUTPATIENT)
Age: 58
Discharge: HOME | End: 2023-04-17
Payer: COMMERCIAL

## 2023-04-17 DIAGNOSIS — N46.9 INFERTILITY MALE: ICD-10-CM

## 2023-04-17 DIAGNOSIS — E29.1 HYPOGONADISM MALE: ICD-10-CM

## 2023-04-17 DIAGNOSIS — E23.6 RATHKE'S CLEFT CYST (HCC): ICD-10-CM

## 2023-04-17 LAB
ALBUMIN SERPL-MCNC: 4.4 G/DL (ref 3.4–5)
ALBUMIN/GLOB SERPL: 1.6 {RATIO} (ref 1.1–2.2)
ALP SERPL-CCNC: 74 U/L (ref 40–129)
ALT SERPL-CCNC: 32 U/L (ref 10–40)
ANION GAP SERPL CALCULATED.3IONS-SCNC: 11 MMOL/L (ref 3–16)
AST SERPL-CCNC: 30 U/L (ref 15–37)
BASOPHILS # BLD: 0.1 K/UL (ref 0–0.2)
BASOPHILS NFR BLD: 1.3 %
BILIRUB SERPL-MCNC: 0.4 MG/DL (ref 0–1)
BUN SERPL-MCNC: 19 MG/DL (ref 7–20)
CALCIUM SERPL-MCNC: 9.7 MG/DL (ref 8.3–10.6)
CHLORIDE SERPL-SCNC: 114 MMOL/L (ref 99–110)
CO2 SERPL-SCNC: 21 MMOL/L (ref 21–32)
CREAT SERPL-MCNC: 0.9 MG/DL (ref 0.9–1.3)
DEPRECATED RDW RBC AUTO: 13.7 % (ref 12.4–15.4)
EOSINOPHIL # BLD: 0.3 K/UL (ref 0–0.6)
EOSINOPHIL NFR BLD: 6 %
GFR SERPLBLD CREATININE-BSD FMLA CKD-EPI: >60 ML/MIN/{1.73_M2}
GLUCOSE SERPL-MCNC: 119 MG/DL (ref 70–99)
HCT VFR BLD AUTO: 42 % (ref 40.5–52.5)
HGB BLD-MCNC: 14 G/DL (ref 13.5–17.5)
LYMPHOCYTES # BLD: 1.1 K/UL (ref 1–5.1)
LYMPHOCYTES NFR BLD: 25.1 %
MCH RBC QN AUTO: 29.5 PG (ref 26–34)
MCHC RBC AUTO-ENTMCNC: 33.2 G/DL (ref 31–36)
MCV RBC AUTO: 88.6 FL (ref 80–100)
MONOCYTES # BLD: 0.5 K/UL (ref 0–1.3)
MONOCYTES NFR BLD: 10.7 %
NEUTROPHILS # BLD: 2.5 K/UL (ref 1.7–7.7)
NEUTROPHILS NFR BLD: 56.9 %
PLATELET # BLD AUTO: 152 K/UL (ref 135–450)
PMV BLD AUTO: 9.4 FL (ref 5–10.5)
POTASSIUM SERPL-SCNC: 4 MMOL/L (ref 3.5–5.1)
PROT SERPL-MCNC: 7.1 G/DL (ref 6.4–8.2)
RBC # BLD AUTO: 4.74 M/UL (ref 4.2–5.9)
SODIUM SERPL-SCNC: 146 MMOL/L (ref 136–145)
WBC # BLD AUTO: 4.4 K/UL (ref 4–11)

## 2023-04-17 PROCEDURE — 85025 COMPLETE CBC W/AUTO DIFF WBC: CPT

## 2023-04-17 PROCEDURE — 80053 COMPREHEN METABOLIC PANEL: CPT

## 2023-04-17 PROCEDURE — 82671 ASSAY OF ESTROGENS: CPT

## 2023-04-17 PROCEDURE — 84270 ASSAY OF SEX HORMONE GLOBUL: CPT

## 2023-04-17 PROCEDURE — 36415 COLL VENOUS BLD VENIPUNCTURE: CPT

## 2023-04-17 PROCEDURE — 84403 ASSAY OF TOTAL TESTOSTERONE: CPT

## 2023-04-19 LAB
SHBG SERPL-SCNC: 127 NMOL/L (ref 11–80)
TESTOST FREE SERPL-MCNC: 36.2 PG/ML (ref 47–244)
TESTOST SERPL-MCNC: 493 NG/DL (ref 220–1000)

## 2023-04-21 LAB
ESTRADIOL SERPL HS-MCNC: 16.4 PG/ML (ref 10–42)
ESTROGEN SERPL CALC-MCNC: 36.1 PG/ML (ref 19–69)
ESTRONE SERPL-MCNC: 19.7 PG/ML (ref 9–36)

## 2023-04-27 ENCOUNTER — OFFICE VISIT (OUTPATIENT)
Dept: ENDOCRINOLOGY | Age: 58
End: 2023-04-27
Payer: COMMERCIAL

## 2023-04-27 VITALS
HEART RATE: 84 BPM | OXYGEN SATURATION: 97 % | DIASTOLIC BLOOD PRESSURE: 88 MMHG | SYSTOLIC BLOOD PRESSURE: 119 MMHG | HEIGHT: 71 IN | BODY MASS INDEX: 31.64 KG/M2 | WEIGHT: 226 LBS

## 2023-04-27 DIAGNOSIS — N46.9 INFERTILITY MALE: ICD-10-CM

## 2023-04-27 DIAGNOSIS — E23.6 RATHKE'S CLEFT CYST (HCC): ICD-10-CM

## 2023-04-27 DIAGNOSIS — E29.1 HYPOGONADISM MALE: Primary | ICD-10-CM

## 2023-04-27 DIAGNOSIS — E55.9 VITAMIN D DEFICIENCY: ICD-10-CM

## 2023-04-27 PROCEDURE — 3074F SYST BP LT 130 MM HG: CPT | Performed by: INTERNAL MEDICINE

## 2023-04-27 PROCEDURE — 99214 OFFICE O/P EST MOD 30 MIN: CPT | Performed by: INTERNAL MEDICINE

## 2023-04-27 PROCEDURE — 3079F DIAST BP 80-89 MM HG: CPT | Performed by: INTERNAL MEDICINE

## 2023-04-27 RX ORDER — TESTOSTERONE 16.2 MG/G
3 GEL TRANSDERMAL DAILY
Qty: 150 G | Refills: 3 | Status: SHIPPED | OUTPATIENT
Start: 2023-04-27 | End: 2023-07-10

## 2023-04-27 NOTE — PROGRESS NOTES
25 Hydroxy; Future  -     Magnesium; Future  -     Testosterone, free, total; Future  -     Estrogens, Fractionated; Future    Vitamin D deficiency  -     Vitamin D 25 Hydroxy; Future          1: 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 Rosa Casey     MRI June 2022: cyst collaped. MRI Dec 2022: No change reportedly   Repeat MRI in Dec 2023      2: Male hypogonadism       < 721  Free T 36 < 46    Will target -1000 and normal Free T     Free T is low, will keep checking it     PSA 0.33 - Oct 2022     Change Androgel 1.6%, to 3 actuations daily      The FDA is updating the labels for all prescription testosterone products to warn of their potential for abuse and dependence. Adverse effects linked to testosterone abuse include myocardial infarction, heart failure, stroke, male infertility, depression,  Aggression,  blood clots, prostate hyperplasia/cancer, sleep apnea, acne, and breast enlargement. Men who use a testosterone gel should wash their hands thoroughly after applying a dose and make sure that no one else touches the spots where they medicate. If a woman or child comes into contact with testosterone gels, it can cause side effects in them, including hair growth and premature puberty. Testosterone abuse often occurs with other anabolic androgenic steroids and at higher doses than what is prescribed. Withdrawal symptoms have included fatigue, irritability, loss of appetite, reduced libido, and insomnia.      4: ED   If it continues after nomral T levels then he will need a Urology evaluation      5:Muscle cramps   Check Vit D and Mag   Taking vit D 2,000 units once a day     RTC in 4 months with CBC, CMP, TT , estrogens        Electronically signed by Chapo Hernandez MD on 4/27/2023 at 4:22 PM

## 2023-05-14 DIAGNOSIS — E29.1 HYPOGONADISM MALE: ICD-10-CM

## 2023-05-14 DIAGNOSIS — N46.9 INFERTILITY MALE: ICD-10-CM

## 2023-05-15 RX ORDER — TESTOSTERONE 16.2 MG/G
GEL TRANSDERMAL
Qty: 75 G | OUTPATIENT
Start: 2023-05-15

## 2023-05-22 ENCOUNTER — OFFICE VISIT (OUTPATIENT)
Dept: FAMILY MEDICINE CLINIC | Age: 58
End: 2023-05-22
Payer: COMMERCIAL

## 2023-05-22 VITALS
HEIGHT: 71 IN | SYSTOLIC BLOOD PRESSURE: 134 MMHG | OXYGEN SATURATION: 96 % | WEIGHT: 224 LBS | DIASTOLIC BLOOD PRESSURE: 80 MMHG | BODY MASS INDEX: 31.36 KG/M2 | HEART RATE: 76 BPM

## 2023-05-22 DIAGNOSIS — E78.1 HYPERTRIGLYCERIDEMIA: ICD-10-CM

## 2023-05-22 DIAGNOSIS — E29.1 HYPOGONADISM MALE: ICD-10-CM

## 2023-05-22 DIAGNOSIS — I15.2 HYPERTENSION DUE TO ENDOCRINE DISORDER: ICD-10-CM

## 2023-05-22 DIAGNOSIS — E11.9 TYPE 2 DIABETES MELLITUS WITHOUT COMPLICATION, WITHOUT LONG-TERM CURRENT USE OF INSULIN (HCC): Primary | ICD-10-CM

## 2023-05-22 PROCEDURE — 83036 HEMOGLOBIN GLYCOSYLATED A1C: CPT | Performed by: FAMILY MEDICINE

## 2023-05-22 PROCEDURE — 3079F DIAST BP 80-89 MM HG: CPT | Performed by: FAMILY MEDICINE

## 2023-05-22 PROCEDURE — 99214 OFFICE O/P EST MOD 30 MIN: CPT | Performed by: FAMILY MEDICINE

## 2023-05-22 PROCEDURE — 3075F SYST BP GE 130 - 139MM HG: CPT | Performed by: FAMILY MEDICINE

## 2023-05-22 NOTE — PROGRESS NOTES
Father 54    Diabetes Brother     Cancer Paternal Grandmother      Social History     Socioeconomic History    Marital status:      Spouse name: Not on file    Number of children: Not on file    Years of education: Not on file    Highest education level: Not on file   Occupational History    Not on file   Tobacco Use    Smoking status: Never    Smokeless tobacco: Never   Vaping Use    Vaping Use: Never used   Substance and Sexual Activity    Alcohol use: Not Currently     Alcohol/week: 6.0 standard drinks     Types: 6 Standard drinks or equivalent per week    Drug use: No    Sexual activity: Yes     Partners: Female   Other Topics Concern    Not on file   Social History Narrative    Not on file     Social Determinants of Health     Financial Resource Strain: Low Risk     Difficulty of Paying Living Expenses: Not hard at all   Food Insecurity: No Food Insecurity    Worried About Running Out of Food in the Last Year: Never true    Ran Out of Food in the Last Year: Never true   Transportation Needs: Not on file   Physical Activity: Not on file   Stress: Not on file   Social Connections: Not on file   Intimate Partner Violence: Not on file   Housing Stability: Not on file         ROS:  Gen:  Denies fever, chills, headaches. HEENT:  Denies cold symptoms, sore throat. CV:  Denies chest pain or tightness, palpitations. Pulm:  Denies shortness of breath, cough. Abd:  Denies abdominal pain, change in bowel habits. I have reviewed the patient's medical history in detail and updated the computerized patient record as appropriate. OBJECTIVE:  /80 (Site: Right Upper Arm, Position: Sitting, Cuff Size: Large Adult)   Pulse 76   Ht 5' 11\" (1.803 m)   Wt 224 lb (101.6 kg)   SpO2 96%   BMI 31.24 kg/m²   GEN:  WDWN, NAD  HEENT:  NCAT, PERRL, EOMI. CV:  Regular rate and rhythm, S1 and S2 normal, no murmurs, clicks, gallops or rubs. No edema. PULM:  Chest is clear, no wheezing or rales.  Normal symmetric

## 2023-06-26 RX ORDER — TOPIRAMATE 50 MG/1
50 TABLET, FILM COATED ORAL 2 TIMES DAILY
Qty: 60 TABLET | Refills: 0 | Status: SHIPPED | OUTPATIENT
Start: 2023-06-26

## 2023-08-01 RX ORDER — TOPIRAMATE 50 MG/1
TABLET, FILM COATED ORAL
Qty: 60 TABLET | Refills: 3 | Status: SHIPPED | OUTPATIENT
Start: 2023-08-01

## 2023-08-01 NOTE — TELEPHONE ENCOUNTER
Medication:   Requested Prescriptions     Pending Prescriptions Disp Refills    topiramate (TOPAMAX) 50 MG tablet [Pharmacy Med Name: TOPIRAMATE 50 MG TABLET] 60 tablet 0     Sig: TAKE 1 TABLET BY MOUTH TWICE A DAY        Last Filled:  06/26/2023 #60 0rf    Patient Phone Number: [phone] (home)     Last appt: 5/22/2023   Next appt: 11/22/2023    Last OARRS:   RX Monitoring 7/28/2022   Periodic Controlled Substance Monitoring No signs of potential drug abuse or diversion identified.

## 2023-08-15 DIAGNOSIS — E11.9 TYPE 2 DIABETES MELLITUS WITHOUT COMPLICATION, WITHOUT LONG-TERM CURRENT USE OF INSULIN (HCC): ICD-10-CM

## 2023-08-15 RX ORDER — LOSARTAN POTASSIUM 25 MG/1
25 TABLET ORAL DAILY
Qty: 30 TABLET | Refills: 3 | Status: SHIPPED | OUTPATIENT
Start: 2023-08-15

## 2023-08-15 RX ORDER — GLIPIZIDE 10 MG/1
10 TABLET, FILM COATED, EXTENDED RELEASE ORAL DAILY
Qty: 30 TABLET | Refills: 3 | Status: SHIPPED | OUTPATIENT
Start: 2023-08-15

## 2023-08-15 NOTE — TELEPHONE ENCOUNTER
Medication:   Requested Prescriptions     Pending Prescriptions Disp Refills    losartan (COZAAR) 25 MG tablet 30 tablet 3     Sig: Take 1 tablet by mouth daily    glipiZIDE (GLUCOTROL XL) 10 MG extended release tablet 30 tablet 3     Sig: Take 1 tablet by mouth daily    dapagliflozin (FARXIGA) 10 MG tablet 30 tablet 3     Sig: Take 1 tablet by mouth every morning       Last Filled:  04/14/2023 #30 3rf    Patient Phone Number: [phone] (home)     Last appt: 5/22/2023   Next appt: 11/22/2023    Lab Results   Component Value Date     (H) 04/17/2023    K 4.0 04/17/2023     (H) 04/17/2023    CO2 21 04/17/2023    BUN 19 04/17/2023    CREATININE 0.9 04/17/2023    GLUCOSE 119 (H) 04/17/2023    CALCIUM 9.7 04/17/2023    PROT 7.1 04/17/2023    LABALBU 4.4 04/17/2023    BILITOT 0.4 04/17/2023    ALKPHOS 74 04/17/2023    AST 30 04/17/2023    ALT 32 04/17/2023    LABGLOM >60 04/17/2023    GFRAA >60 07/26/2022    AGRATIO 1.6 04/17/2023    GLOB 2.6 08/11/2021

## 2023-08-17 ENCOUNTER — HOSPITAL ENCOUNTER (OUTPATIENT)
Age: 58
Discharge: HOME | End: 2023-08-17
Payer: COMMERCIAL

## 2023-08-17 DIAGNOSIS — E55.9 VITAMIN D DEFICIENCY: ICD-10-CM

## 2023-08-17 DIAGNOSIS — E29.1 HYPOGONADISM MALE: ICD-10-CM

## 2023-08-17 DIAGNOSIS — E23.6 RATHKE'S CLEFT CYST (HCC): ICD-10-CM

## 2023-08-17 DIAGNOSIS — N46.9 INFERTILITY MALE: ICD-10-CM

## 2023-08-17 LAB
25(OH)D3 SERPL-MCNC: 46.1 NG/ML
ALBUMIN SERPL-MCNC: 4.6 G/DL (ref 3.4–5)
ALBUMIN/GLOB SERPL: 2.1 {RATIO} (ref 1.1–2.2)
ALP SERPL-CCNC: 74 U/L (ref 40–129)
ALT SERPL-CCNC: 34 U/L (ref 10–40)
ANION GAP SERPL CALCULATED.3IONS-SCNC: 11 MMOL/L (ref 3–16)
AST SERPL-CCNC: 25 U/L (ref 15–37)
BASOPHILS # BLD: 0 K/UL (ref 0–0.2)
BASOPHILS NFR BLD: 1 %
BILIRUB SERPL-MCNC: 0.7 MG/DL (ref 0–1)
BUN SERPL-MCNC: 13 MG/DL (ref 7–20)
CALCIUM SERPL-MCNC: 9.5 MG/DL (ref 8.3–10.6)
CHLORIDE SERPL-SCNC: 107 MMOL/L (ref 99–110)
CO2 SERPL-SCNC: 23 MMOL/L (ref 21–32)
CREAT SERPL-MCNC: 1.1 MG/DL (ref 0.9–1.3)
DEPRECATED RDW RBC AUTO: 13.4 % (ref 12.4–15.4)
EOSINOPHIL # BLD: 0.1 K/UL (ref 0–0.6)
EOSINOPHIL NFR BLD: 3.3 %
GFR SERPLBLD CREATININE-BSD FMLA CKD-EPI: >60 ML/MIN/{1.73_M2}
GLUCOSE SERPL-MCNC: 126 MG/DL (ref 70–99)
HCT VFR BLD AUTO: 43.7 % (ref 40.5–52.5)
HGB BLD-MCNC: 14.9 G/DL (ref 13.5–17.5)
LYMPHOCYTES # BLD: 1.2 K/UL (ref 1–5.1)
LYMPHOCYTES NFR BLD: 26.9 %
MAGNESIUM SERPL-MCNC: 2.3 MG/DL (ref 1.8–2.4)
MCH RBC QN AUTO: 29.5 PG (ref 26–34)
MCHC RBC AUTO-ENTMCNC: 34.1 G/DL (ref 31–36)
MCV RBC AUTO: 86.3 FL (ref 80–100)
MONOCYTES # BLD: 0.4 K/UL (ref 0–1.3)
MONOCYTES NFR BLD: 10.4 %
NEUTROPHILS # BLD: 2.5 K/UL (ref 1.7–7.7)
NEUTROPHILS NFR BLD: 58.4 %
PLATELET # BLD AUTO: 158 K/UL (ref 135–450)
PMV BLD AUTO: 9.7 FL (ref 5–10.5)
POTASSIUM SERPL-SCNC: 4.5 MMOL/L (ref 3.5–5.1)
PROT SERPL-MCNC: 6.8 G/DL (ref 6.4–8.2)
RBC # BLD AUTO: 5.07 M/UL (ref 4.2–5.9)
SODIUM SERPL-SCNC: 141 MMOL/L (ref 136–145)
WBC # BLD AUTO: 4.3 K/UL (ref 4–11)

## 2023-08-17 PROCEDURE — 82671 ASSAY OF ESTROGENS: CPT

## 2023-08-17 PROCEDURE — 82306 VITAMIN D 25 HYDROXY: CPT

## 2023-08-17 PROCEDURE — 83735 ASSAY OF MAGNESIUM: CPT

## 2023-08-17 PROCEDURE — 36415 COLL VENOUS BLD VENIPUNCTURE: CPT

## 2023-08-17 PROCEDURE — 80053 COMPREHEN METABOLIC PANEL: CPT

## 2023-08-17 PROCEDURE — 84403 ASSAY OF TOTAL TESTOSTERONE: CPT

## 2023-08-17 PROCEDURE — 85025 COMPLETE CBC W/AUTO DIFF WBC: CPT

## 2023-08-17 PROCEDURE — 84270 ASSAY OF SEX HORMONE GLOBUL: CPT

## 2023-08-19 LAB
SHBG SERPL-SCNC: 139 NMOL/L (ref 11–80)
TESTOST FREE SERPL-MCNC: 50.4 PG/ML (ref 47–244)
TESTOST SERPL-MCNC: 715 NG/DL (ref 220–1000)

## 2023-08-20 LAB
ESTRADIOL SERPL HS-MCNC: 13 PG/ML (ref 10–42)
ESTROGEN SERPL CALC-MCNC: 32 PG/ML (ref 19–69)
ESTRONE SERPL-MCNC: 19 PG/ML (ref 9–36)

## 2023-08-31 ENCOUNTER — OFFICE VISIT (OUTPATIENT)
Dept: ENDOCRINOLOGY | Age: 58
End: 2023-08-31
Payer: COMMERCIAL

## 2023-08-31 VITALS
HEART RATE: 80 BPM | TEMPERATURE: 98 F | SYSTOLIC BLOOD PRESSURE: 130 MMHG | RESPIRATION RATE: 14 BRPM | BODY MASS INDEX: 32.06 KG/M2 | WEIGHT: 229 LBS | HEIGHT: 71 IN | DIASTOLIC BLOOD PRESSURE: 80 MMHG

## 2023-08-31 DIAGNOSIS — N52.9 ERECTILE DYSFUNCTION, UNSPECIFIED ERECTILE DYSFUNCTION TYPE: Primary | ICD-10-CM

## 2023-08-31 DIAGNOSIS — N46.9 INFERTILITY MALE: ICD-10-CM

## 2023-08-31 DIAGNOSIS — E29.1 HYPOGONADISM MALE: ICD-10-CM

## 2023-08-31 PROCEDURE — 99214 OFFICE O/P EST MOD 30 MIN: CPT | Performed by: INTERNAL MEDICINE

## 2023-08-31 PROCEDURE — 3079F DIAST BP 80-89 MM HG: CPT | Performed by: INTERNAL MEDICINE

## 2023-08-31 PROCEDURE — 3075F SYST BP GE 130 - 139MM HG: CPT | Performed by: INTERNAL MEDICINE

## 2023-08-31 RX ORDER — TESTOSTERONE 16.2 MG/G
4 GEL TRANSDERMAL DAILY
Qty: 1 EACH | Refills: 2 | Status: SHIPPED | OUTPATIENT
Start: 2023-08-31 | End: 2023-11-29

## 2023-08-31 NOTE — PROGRESS NOTES
3 months with CBC, CMP, TT , estrogens  , HCG, zinc, Hepatitis screen       Electronically signed by Candis Gurrola MD on 8/31/2023 at 4:00 PM

## 2023-09-13 DIAGNOSIS — E11.9 TYPE 2 DIABETES MELLITUS WITHOUT COMPLICATION, WITHOUT LONG-TERM CURRENT USE OF INSULIN (HCC): ICD-10-CM

## 2023-09-13 RX ORDER — METFORMIN HYDROCHLORIDE 500 MG/1
TABLET, EXTENDED RELEASE ORAL
Qty: 60 TABLET | Refills: 5 | Status: SHIPPED | OUTPATIENT
Start: 2023-09-13

## 2023-09-13 NOTE — TELEPHONE ENCOUNTER
Medication:   Requested Prescriptions     Pending Prescriptions Disp Refills    metFORMIN (GLUCOPHAGE-XR) 500 MG extended release tablet [Pharmacy Med Name: METFORMIN HCL  MG TABLET] 60 tablet 5     Sig: TAKE TWO TABLETS BY MOUTH DAILY WITH BREAKFAST       Last Filled:  03/13/2023 #60 5rf     Patient Phone Number: [phone] (home)     Last appt: 5/22/2023   Next appt: 11/22/2023    Last Labs DM:   Lab Results   Component Value Date/Time    LABA1C 5.1 08/15/2022 05:45 PM

## 2023-11-27 ENCOUNTER — HOSPITAL ENCOUNTER (OUTPATIENT)
Age: 58
Discharge: HOME | End: 2023-11-27
Payer: COMMERCIAL

## 2023-11-27 DIAGNOSIS — E29.1 HYPOGONADISM MALE: ICD-10-CM

## 2023-11-27 DIAGNOSIS — N46.9 INFERTILITY MALE: ICD-10-CM

## 2023-11-27 LAB
ALBUMIN SERPL-MCNC: 4.6 G/DL (ref 3.4–5)
ALBUMIN/GLOB SERPL: 2 {RATIO} (ref 1.1–2.2)
ALP SERPL-CCNC: 77 U/L (ref 40–129)
ALT SERPL-CCNC: 31 U/L (ref 10–40)
ANION GAP SERPL CALCULATED.3IONS-SCNC: 9 MMOL/L (ref 3–16)
AST SERPL-CCNC: 24 U/L (ref 15–37)
BASOPHILS # BLD: 0 K/UL (ref 0–0.2)
BASOPHILS NFR BLD: 1.1 %
BILIRUB SERPL-MCNC: 0.4 MG/DL (ref 0–1)
BUN SERPL-MCNC: 14 MG/DL (ref 7–20)
CALCIUM SERPL-MCNC: 9.1 MG/DL (ref 8.3–10.6)
CHLORIDE SERPL-SCNC: 111 MMOL/L (ref 99–110)
CO2 SERPL-SCNC: 21 MMOL/L (ref 21–32)
CREAT SERPL-MCNC: 1 MG/DL (ref 0.9–1.3)
DEPRECATED RDW RBC AUTO: 13.8 % (ref 12.4–15.4)
EOSINOPHIL # BLD: 0.2 K/UL (ref 0–0.6)
EOSINOPHIL NFR BLD: 3.9 %
GFR SERPLBLD CREATININE-BSD FMLA CKD-EPI: >60 ML/MIN/{1.73_M2}
GLUCOSE SERPL-MCNC: 157 MG/DL (ref 70–99)
HAV IGM SERPL QL IA: NORMAL
HBV CORE IGM SERPL QL IA: NORMAL
HBV SURFACE AG SERPL QL IA: NORMAL
HCT VFR BLD AUTO: 42.9 % (ref 40.5–52.5)
HCV AB SERPL QL IA: NORMAL
HGB BLD-MCNC: 14.7 G/DL (ref 13.5–17.5)
LYMPHOCYTES # BLD: 1.1 K/UL (ref 1–5.1)
LYMPHOCYTES NFR BLD: 25.8 %
MCH RBC QN AUTO: 29.6 PG (ref 26–34)
MCHC RBC AUTO-ENTMCNC: 34.4 G/DL (ref 31–36)
MCV RBC AUTO: 86 FL (ref 80–100)
MONOCYTES # BLD: 0.4 K/UL (ref 0–1.3)
MONOCYTES NFR BLD: 10 %
NEUTROPHILS # BLD: 2.6 K/UL (ref 1.7–7.7)
NEUTROPHILS NFR BLD: 59.2 %
PLATELET # BLD AUTO: 138 K/UL (ref 135–450)
PMV BLD AUTO: 9.8 FL (ref 5–10.5)
POTASSIUM SERPL-SCNC: 3.9 MMOL/L (ref 3.5–5.1)
PROT SERPL-MCNC: 6.9 G/DL (ref 6.4–8.2)
RBC # BLD AUTO: 4.99 M/UL (ref 4.2–5.9)
SODIUM SERPL-SCNC: 141 MMOL/L (ref 136–145)
WBC # BLD AUTO: 4.5 K/UL (ref 4–11)

## 2023-11-27 PROCEDURE — 80074 ACUTE HEPATITIS PANEL: CPT

## 2023-11-27 PROCEDURE — 82671 ASSAY OF ESTROGENS: CPT

## 2023-11-27 PROCEDURE — 85025 COMPLETE CBC W/AUTO DIFF WBC: CPT

## 2023-11-27 PROCEDURE — 84403 ASSAY OF TOTAL TESTOSTERONE: CPT

## 2023-11-27 PROCEDURE — 36415 COLL VENOUS BLD VENIPUNCTURE: CPT

## 2023-11-27 PROCEDURE — 80053 COMPREHEN METABOLIC PANEL: CPT

## 2023-11-27 PROCEDURE — 84270 ASSAY OF SEX HORMONE GLOBUL: CPT

## 2023-11-27 PROCEDURE — 84704 HCG FREE BETACHAIN TEST: CPT

## 2023-11-27 PROCEDURE — 84630 ASSAY OF ZINC: CPT

## 2023-11-28 LAB
B-HCG SERPL-ACNC: <1 IU/L (ref 0–3)
ZINC SERPL-MCNC: 81 UG/DL (ref 60–120)

## 2023-11-29 LAB
ESTRADIOL SERPL HS-MCNC: 12.6 PG/ML (ref 10–42)
ESTROGEN SERPL CALC-MCNC: 30.6 PG/ML (ref 19–69)
ESTRONE SERPL-MCNC: 18 PG/ML (ref 9–36)

## 2023-11-30 ENCOUNTER — OFFICE VISIT (OUTPATIENT)
Dept: ENDOCRINOLOGY | Age: 58
End: 2023-11-30
Payer: COMMERCIAL

## 2023-11-30 VITALS
BODY MASS INDEX: 32.06 KG/M2 | SYSTOLIC BLOOD PRESSURE: 133 MMHG | HEIGHT: 71 IN | OXYGEN SATURATION: 96 % | WEIGHT: 229 LBS | HEART RATE: 83 BPM | DIASTOLIC BLOOD PRESSURE: 79 MMHG

## 2023-11-30 DIAGNOSIS — E29.1 HYPOGONADISM MALE: Primary | ICD-10-CM

## 2023-11-30 DIAGNOSIS — E55.9 VITAMIN D DEFICIENCY: ICD-10-CM

## 2023-11-30 DIAGNOSIS — F52.4 PREMATURE EJACULATION: ICD-10-CM

## 2023-11-30 DIAGNOSIS — N52.9 ERECTILE DYSFUNCTION, UNSPECIFIED ERECTILE DYSFUNCTION TYPE: ICD-10-CM

## 2023-11-30 DIAGNOSIS — N46.9 INFERTILITY MALE: ICD-10-CM

## 2023-11-30 LAB
SHBG SERPL-SCNC: 147 NMOL/L (ref 11–80)
TESTOST FREE SERPL-MCNC: 59.7 PG/ML (ref 47–244)
TESTOST SERPL-MCNC: 867 NG/DL (ref 220–1000)

## 2023-11-30 PROCEDURE — 3078F DIAST BP <80 MM HG: CPT | Performed by: INTERNAL MEDICINE

## 2023-11-30 PROCEDURE — 3075F SYST BP GE 130 - 139MM HG: CPT | Performed by: INTERNAL MEDICINE

## 2023-11-30 PROCEDURE — 99214 OFFICE O/P EST MOD 30 MIN: CPT | Performed by: INTERNAL MEDICINE

## 2023-11-30 RX ORDER — TESTOSTERONE 16.2 MG/G
4 GEL TRANSDERMAL DAILY
Qty: 1 EACH | Refills: 2 | Status: CANCELLED | OUTPATIENT
Start: 2023-11-30 | End: 2024-02-28

## 2023-11-30 RX ORDER — TESTOSTERONE 16.2 MG/G
4 GEL TRANSDERMAL DAILY
Qty: 2 EACH | Refills: 3 | Status: SHIPPED | OUTPATIENT
Start: 2023-11-30 | End: 2024-02-28

## 2023-11-30 NOTE — PROGRESS NOTES
9.0 - 36.0 pg/mL Final    Estrogen Total 08/17/2023 32.0  19.0 - 69.0 pg/mL Final    Testosterone 08/17/2023 715  220 - 1,000 ng/dL Final    Sex Hormone Binding 08/17/2023 139 (H)  11 - 80 nmol/L Final    Testosterone, Free 08/17/2023 50.4  47 - 244 pg/mL Final    Magnesium 08/17/2023 2.30  1.80 - 2.40 mg/dL Final    Sodium 08/17/2023 141  136 - 145 mmol/L Final    Potassium 08/17/2023 4.5  3.5 - 5.1 mmol/L Final    Chloride 08/17/2023 107  99 - 110 mmol/L Final    CO2 08/17/2023 23  21 - 32 mmol/L Final    Anion Gap 08/17/2023 11  3 - 16 Final    Glucose 08/17/2023 126 (H)  70 - 99 mg/dL Final    BUN 08/17/2023 13  7 - 20 mg/dL Final    Creatinine 08/17/2023 1.1  0.9 - 1.3 mg/dL Final    Est, Glom Filt Rate 08/17/2023 >60  >60 Final    Calcium 08/17/2023 9.5  8.3 - 10.6 mg/dL Final    Total Protein 08/17/2023 6.8  6.4 - 8.2 g/dL Final    Albumin 08/17/2023 4.6  3.4 - 5.0 g/dL Final    Albumin/Globulin Ratio 08/17/2023 2.1  1.1 - 2.2 Final    Total Bilirubin 08/17/2023 0.7  0.0 - 1.0 mg/dL Final    Alkaline Phosphatase 08/17/2023 74  40 - 129 U/L Final    ALT 08/17/2023 34  10 - 40 U/L Final    AST 08/17/2023 25  15 - 37 U/L Final    WBC 08/17/2023 4.3  4.0 - 11.0 K/uL Final    RBC 08/17/2023 5.07  4.20 - 5.90 M/uL Final    Hemoglobin 08/17/2023 14.9  13.5 - 17.5 g/dL Final    Hematocrit 08/17/2023 43.7  40.5 - 52.5 % Final    MCV 08/17/2023 86.3  80.0 - 100.0 fL Final    MCH 08/17/2023 29.5  26.0 - 34.0 pg Final    MCHC 08/17/2023 34.1  31.0 - 36.0 g/dL Final    RDW 08/17/2023 13.4  12.4 - 15.4 % Final    Platelets 18/79/5429 158  135 - 450 K/uL Final    MPV 08/17/2023 9.7  5.0 - 10.5 fL Final    Neutrophils % 08/17/2023 58.4  % Final    Lymphocytes % 08/17/2023 26.9  % Final    Monocytes % 08/17/2023 10.4  % Final    Eosinophils % 08/17/2023 3.3  % Final    Basophils % 08/17/2023 1.0  % Final    Neutrophils Absolute 08/17/2023 2.5  1.7 - 7.7 K/uL Final    Lymphocytes Absolute 08/17/2023 1.2  1.0 - 5.1 K/uL Final

## 2023-12-15 DIAGNOSIS — E11.9 TYPE 2 DIABETES MELLITUS WITHOUT COMPLICATION, WITHOUT LONG-TERM CURRENT USE OF INSULIN (HCC): ICD-10-CM

## 2023-12-15 RX ORDER — LOSARTAN POTASSIUM 25 MG/1
25 TABLET ORAL DAILY
Qty: 30 TABLET | Refills: 3 | Status: SHIPPED | OUTPATIENT
Start: 2023-12-15

## 2023-12-15 RX ORDER — DAPAGLIFLOZIN 10 MG/1
10 TABLET, FILM COATED ORAL EVERY MORNING
Qty: 30 TABLET | Refills: 3 | Status: SHIPPED | OUTPATIENT
Start: 2023-12-15

## 2023-12-15 RX ORDER — GLIPIZIDE 10 MG/1
10 TABLET, FILM COATED, EXTENDED RELEASE ORAL DAILY
Qty: 30 TABLET | Refills: 3 | Status: SHIPPED | OUTPATIENT
Start: 2023-12-15

## 2023-12-15 NOTE — TELEPHONE ENCOUNTER
Medication:   Requested Prescriptions     Pending Prescriptions Disp Refills    glipiZIDE (GLUCOTROL XL) 10 MG extended release tablet [Pharmacy Med Name: glipiZIDE XL 10 MG TABLET] 30 tablet 3     Sig: TAKE 1 TABLET BY MOUTH DAILY    FARXIGA 10 MG tablet [Pharmacy Med Name: Tiny Balls 10 MG TABLET] 30 tablet 3     Sig: TAKE ONE TABLET BY MOUTH EVERY MORNING    losartan (COZAAR) 25 MG tablet [Pharmacy Med Name: LOSARTAN POTASSIUM 25 MG TAB] 30 tablet 3     Sig: TAKE 1 TABLET BY MOUTH DAILY       Last Filled:  08/15/2023 #30 3rf    Patient Phone Number: [phone] (home)     Last appt: 5/22/2023   Next appt: 12/18/2023    Last Labs DM:   Lab Results   Component Value Date/Time    LABA1C 5.1 08/15/2022 05:45 PM

## 2024-01-10 SDOH — ECONOMIC STABILITY: INCOME INSECURITY: HOW HARD IS IT FOR YOU TO PAY FOR THE VERY BASICS LIKE FOOD, HOUSING, MEDICAL CARE, AND HEATING?: NOT HARD AT ALL

## 2024-01-10 SDOH — ECONOMIC STABILITY: TRANSPORTATION INSECURITY
IN THE PAST 12 MONTHS, HAS LACK OF TRANSPORTATION KEPT YOU FROM MEETINGS, WORK, OR FROM GETTING THINGS NEEDED FOR DAILY LIVING?: NO

## 2024-01-10 SDOH — ECONOMIC STABILITY: FOOD INSECURITY: WITHIN THE PAST 12 MONTHS, YOU WORRIED THAT YOUR FOOD WOULD RUN OUT BEFORE YOU GOT MONEY TO BUY MORE.: NEVER TRUE

## 2024-01-10 SDOH — ECONOMIC STABILITY: HOUSING INSECURITY
IN THE LAST 12 MONTHS, WAS THERE A TIME WHEN YOU DID NOT HAVE A STEADY PLACE TO SLEEP OR SLEPT IN A SHELTER (INCLUDING NOW)?: NO

## 2024-01-10 SDOH — ECONOMIC STABILITY: FOOD INSECURITY: WITHIN THE PAST 12 MONTHS, THE FOOD YOU BOUGHT JUST DIDN'T LAST AND YOU DIDN'T HAVE MONEY TO GET MORE.: NEVER TRUE

## 2024-01-10 ASSESSMENT — PATIENT HEALTH QUESTIONNAIRE - PHQ9
2. FEELING DOWN, DEPRESSED OR HOPELESS: NOT AT ALL
SUM OF ALL RESPONSES TO PHQ QUESTIONS 1-9: 0
2. FEELING DOWN, DEPRESSED OR HOPELESS: 0
SUM OF ALL RESPONSES TO PHQ QUESTIONS 1-9: 0
1. LITTLE INTEREST OR PLEASURE IN DOING THINGS: NOT AT ALL
SUM OF ALL RESPONSES TO PHQ9 QUESTIONS 1 & 2: 0
SUM OF ALL RESPONSES TO PHQ QUESTIONS 1-9: 0
1. LITTLE INTEREST OR PLEASURE IN DOING THINGS: 0
SUM OF ALL RESPONSES TO PHQ QUESTIONS 1-9: 0
SUM OF ALL RESPONSES TO PHQ9 QUESTIONS 1 & 2: 0

## 2024-01-11 ENCOUNTER — OFFICE VISIT (OUTPATIENT)
Dept: FAMILY MEDICINE CLINIC | Age: 59
End: 2024-01-11
Payer: COMMERCIAL

## 2024-01-11 VITALS
SYSTOLIC BLOOD PRESSURE: 126 MMHG | WEIGHT: 223 LBS | HEIGHT: 71 IN | DIASTOLIC BLOOD PRESSURE: 78 MMHG | BODY MASS INDEX: 31.22 KG/M2 | OXYGEN SATURATION: 98 % | HEART RATE: 73 BPM

## 2024-01-11 DIAGNOSIS — Z00.00 ANNUAL PHYSICAL EXAM: Primary | ICD-10-CM

## 2024-01-11 DIAGNOSIS — I15.2 HYPERTENSION DUE TO ENDOCRINE DISORDER: ICD-10-CM

## 2024-01-11 DIAGNOSIS — E11.9 TYPE 2 DIABETES MELLITUS WITHOUT COMPLICATION, WITHOUT LONG-TERM CURRENT USE OF INSULIN (HCC): ICD-10-CM

## 2024-01-11 DIAGNOSIS — Z82.49 FAMILY HISTORY OF EARLY CAD: ICD-10-CM

## 2024-01-11 DIAGNOSIS — E78.1 HYPERTRIGLYCERIDEMIA: ICD-10-CM

## 2024-01-11 DIAGNOSIS — E66.9 OBESITY (BMI 30-39.9): ICD-10-CM

## 2024-01-11 LAB — HBA1C MFR BLD: 6.2 %

## 2024-01-11 PROCEDURE — 90471 IMMUNIZATION ADMIN: CPT | Performed by: FAMILY MEDICINE

## 2024-01-11 PROCEDURE — 3078F DIAST BP <80 MM HG: CPT | Performed by: FAMILY MEDICINE

## 2024-01-11 PROCEDURE — 3074F SYST BP LT 130 MM HG: CPT | Performed by: FAMILY MEDICINE

## 2024-01-11 PROCEDURE — 90715 TDAP VACCINE 7 YRS/> IM: CPT | Performed by: FAMILY MEDICINE

## 2024-01-11 PROCEDURE — 99396 PREV VISIT EST AGE 40-64: CPT | Performed by: FAMILY MEDICINE

## 2024-01-11 PROCEDURE — 83036 HEMOGLOBIN GLYCOSYLATED A1C: CPT | Performed by: FAMILY MEDICINE

## 2024-02-01 ENCOUNTER — HOSPITAL ENCOUNTER (OUTPATIENT)
Dept: CT IMAGING | Age: 59
Discharge: HOME | End: 2024-02-01
Attending: FAMILY MEDICINE
Payer: COMMERCIAL

## 2024-02-01 DIAGNOSIS — I15.2 HYPERTENSION DUE TO ENDOCRINE DISORDER: ICD-10-CM

## 2024-02-01 DIAGNOSIS — E78.1 HYPERTRIGLYCERIDEMIA: ICD-10-CM

## 2024-02-01 DIAGNOSIS — E11.9 TYPE 2 DIABETES MELLITUS WITHOUT COMPLICATION, WITHOUT LONG-TERM CURRENT USE OF INSULIN (HCC): ICD-10-CM

## 2024-02-01 DIAGNOSIS — Z82.49 FAMILY HISTORY OF EARLY CAD: ICD-10-CM

## 2024-02-01 PROCEDURE — 75571 CT HRT W/O DYE W/CA TEST: CPT

## 2024-02-05 ENCOUNTER — TELEPHONE (OUTPATIENT)
Dept: FAMILY MEDICINE CLINIC | Age: 59
End: 2024-02-05

## 2024-02-05 NOTE — TELEPHONE ENCOUNTER
Pt would like to speak to pcp regarding CT Cardia Calcium results. He has some concerns about results.

## 2024-02-06 NOTE — TELEPHONE ENCOUNTER
Patient is asking about the ranges, does the numbers mean that is how much of arteries are clogged or what do the numbers mean?  Please advise

## 2024-02-06 NOTE — TELEPHONE ENCOUNTER
Patient wants to discuss his questions directly with Dr. Glover and refused setting up VV.     Wants a telephone call. States it will take less than 5 minutes.     Does not get home until 5PM on weekdays.

## 2024-02-07 NOTE — TELEPHONE ENCOUNTER
Has severe blockages in several of his coronary arteries.   He has more blockage than 90% of people his age.   High risk of heart attack  Needs to see cardiology  If he has further questions please set up VV.

## 2024-02-09 ENCOUNTER — TELEPHONE (OUTPATIENT)
Dept: CARDIOLOGY CLINIC | Age: 59
End: 2024-02-09

## 2024-02-09 NOTE — TELEPHONE ENCOUNTER
VIJAYA PATEL spoke with pt and he is not having any symptoms at this time no chest heaviness/pain/pressure and he is not SOB. Test was ordered because of family history. His apt is next Friday. Advised him if he developed any of these symptoms to go to the ER.       CT calcium score     IMPRESSION:  Total Agatston calcium score of 1139.  This is in the 90th percentile for  patient age.

## 2024-02-09 NOTE — TELEPHONE ENCOUNTER
Let him know I am out of the office today and when I am in office I am scheduled with patients. He can email me directly through [employer] with questions if can't do VV

## 2024-02-09 NOTE — TELEPHONE ENCOUNTER
Patient is asking for Dr. Glover to call him to go over his results he has questions for her.  Patient is unable to due a virtual visit. Please advise

## 2024-02-14 NOTE — PATIENT INSTRUCTIONS
Left Heart Cath patient pre-procedure instructions    Do NOT eat or drink anything after midnight morning of procedure    MEDICATIONS:  Your medications have been reviewed. Please follow medication instructions below  It is okay to drink a sip of water with meds morning of procedure    Transportation: Bring someone to drive you home.     Scheduling: Juliana ANTON is our full time procedure . She will call you to schedule your procedure.    Schedule Echo  Contact central scheduling at [address] [place] [address] or [phone]  You could possibly schedule at [place] or [place] or [place]

## 2024-02-14 NOTE — PROGRESS NOTES
[facility]  H+P  Consult  OP Visit  FU Visit   CC/HPI   CC New patient visit for elevated calcium score.   Intervention None   General Doing fair.  Concerned with sob with exertion.  Denies cp.  Strong family hx of early cad in dad and brother.  Lonstanding diabetic.  Other risks include HTN, CHOL.    Recent CorCa with total 1139 (90%), high risk for at least one obstructive blockage.     Cardiac Sx +SOB   HISTORY/ALLERGY/ROS   M/S/S/F Hx Reviewed in Epic and updated as appropriate   ALLERG Lisinopril   ROS Full ROS obtained and negative except as mentioned in HPI   MEDICATIONS   Cardiac medications reviewed in [facility] during visit.   PHYSICAL EXAM   Vitals /70 (Site: Left Upper Arm, Position: Sitting, Cuff Size: Medium Adult)   Pulse 82   Ht 1.803 m (5' 10.98\")   Wt 101.6 kg (223 lb 15.8 oz)   SpO2 98%   BMI 31.25 kg/m²    Gen Alert, coop, no distress Heart  RRR, no MRG   Lung CTA-B, unlabored, no DTP Extrem Edema -Grade 0 (0mm)      COMPLIANCE   Discussed and counseled on diet, exercise, weight loss, smoking, alcohol, drugs.  All questions answered.   CODING   N/A   SCRIBE ATTESTATION   Nurse I, Jeannine Nevarez, RN, am scribing for and in the presence of Jessee Terry MD. 2/14/2024   Doctor Jeannine Nevarez is working as scribe for and in presence of me and may have prepopulated components of note with my historical intellectual property (IP) under my supervision.  Any additions to this IP performed in my presence and at my direction. Content and accuracy of this note reviewed by me (Jessee Terry MD).   ASSESSMENT AND PLAN   *CAD   Date EF Results   Sx   As above   CaSc 2/24  Total 1139, LM 0, , Cx 118,    Plan   High risk profile with sob - DM, HTN, CHOL, FAM  Poor candidate for CorCTA with calcium burden and bloom artifact  Discussed med v stress v LHC in detail  Patient prefers LHC, R/B/O discussed   *HTN  Status Advice Plan   Controlled Diet/salt/xcise/wt counseling Continue

## 2024-02-16 ENCOUNTER — TELEPHONE (OUTPATIENT)
Dept: CARDIOLOGY CLINIC | Age: 59
End: 2024-02-16

## 2024-02-16 ENCOUNTER — OFFICE VISIT (OUTPATIENT)
Age: 59
End: 2024-02-16
Payer: COMMERCIAL

## 2024-02-16 VITALS
OXYGEN SATURATION: 98 % | WEIGHT: 223.99 LBS | SYSTOLIC BLOOD PRESSURE: 132 MMHG | HEART RATE: 82 BPM | DIASTOLIC BLOOD PRESSURE: 70 MMHG | HEIGHT: 71 IN | BODY MASS INDEX: 31.36 KG/M2

## 2024-02-16 DIAGNOSIS — I10 ESSENTIAL HYPERTENSION: ICD-10-CM

## 2024-02-16 DIAGNOSIS — R06.09 DOE (DYSPNEA ON EXERTION): ICD-10-CM

## 2024-02-16 DIAGNOSIS — Z82.49 FAMILY HISTORY OF EARLY CAD: ICD-10-CM

## 2024-02-16 DIAGNOSIS — R93.1 ELEVATED CORONARY ARTERY CALCIUM SCORE: Primary | ICD-10-CM

## 2024-02-16 DIAGNOSIS — E11.9 TYPE 2 DIABETES MELLITUS WITHOUT COMPLICATION, WITHOUT LONG-TERM CURRENT USE OF INSULIN (HCC): ICD-10-CM

## 2024-02-16 PROCEDURE — 3075F SYST BP GE 130 - 139MM HG: CPT | Performed by: INTERNAL MEDICINE

## 2024-02-16 PROCEDURE — 3078F DIAST BP <80 MM HG: CPT | Performed by: INTERNAL MEDICINE

## 2024-02-16 PROCEDURE — 3044F HG A1C LEVEL LT 7.0%: CPT | Performed by: INTERNAL MEDICINE

## 2024-02-16 PROCEDURE — 99204 OFFICE O/P NEW MOD 45 MIN: CPT | Performed by: INTERNAL MEDICINE

## 2024-02-16 PROCEDURE — 93000 ELECTROCARDIOGRAM COMPLETE: CPT | Performed by: INTERNAL MEDICINE

## 2024-02-19 NOTE — TELEPHONE ENCOUNTER
Date of Procedure: Wednesday 2/28/24 @ [facility] with Dr. Terry      Time of arrival: 7:30 am     Procedure time: 9:00 am     Called and spoke to Arash and he is agreeable to date and time. Reviewed instructions and he verbalized understanding. Encouraged to call with any questions or concerns.     Published on [employer], scheduled in Epic and e-mailed to cath lab.

## 2024-02-28 ENCOUNTER — HOSPITAL ENCOUNTER (OUTPATIENT)
Dept: CARDIAC CATH/INVASIVE PROCEDURES | Age: 59
Discharge: HOME | End: 2024-02-28
Attending: INTERNAL MEDICINE | Admitting: INTERNAL MEDICINE
Payer: COMMERCIAL

## 2024-02-28 ENCOUNTER — TELEPHONE (OUTPATIENT)
Age: 59
End: 2024-02-28

## 2024-02-28 VITALS
HEIGHT: 70 IN | HEART RATE: 77 BPM | DIASTOLIC BLOOD PRESSURE: 77 MMHG | RESPIRATION RATE: 15 BRPM | WEIGHT: 220 LBS | OXYGEN SATURATION: 98 % | SYSTOLIC BLOOD PRESSURE: 139 MMHG | BODY MASS INDEX: 31.5 KG/M2

## 2024-02-28 DIAGNOSIS — R93.1 ELEVATED CORONARY ARTERY CALCIUM SCORE: ICD-10-CM

## 2024-02-28 DIAGNOSIS — E11.9 TYPE 2 DIABETES MELLITUS WITHOUT COMPLICATION, WITHOUT LONG-TERM CURRENT USE OF INSULIN (HCC): ICD-10-CM

## 2024-02-28 DIAGNOSIS — I10 ESSENTIAL HYPERTENSION: ICD-10-CM

## 2024-02-28 DIAGNOSIS — Z82.49 FAMILY HISTORY OF EARLY CAD: ICD-10-CM

## 2024-02-28 DIAGNOSIS — R06.09 DOE (DYSPNEA ON EXERTION): ICD-10-CM

## 2024-02-28 LAB
ANION GAP SERPL CALCULATED.3IONS-SCNC: 13 MMOL/L (ref 3–16)
BUN SERPL-MCNC: 13 MG/DL (ref 7–20)
CALCIUM SERPL-MCNC: 9.7 MG/DL (ref 8.3–10.6)
CHLORIDE SERPL-SCNC: 107 MMOL/L (ref 99–110)
CO2 SERPL-SCNC: 21 MMOL/L (ref 21–32)
CREAT SERPL-MCNC: 1 MG/DL (ref 0.9–1.3)
DEPRECATED RDW RBC AUTO: 13.3 % (ref 12.4–15.4)
GFR SERPLBLD CREATININE-BSD FMLA CKD-EPI: >60 ML/MIN/{1.73_M2}
GLUCOSE SERPL-MCNC: 129 MG/DL (ref 70–99)
HCT VFR BLD AUTO: 44.7 % (ref 40.5–52.5)
HGB BLD-MCNC: 15 G/DL (ref 13.5–17.5)
LEFT VENTRICULAR EJECTION FRACTION MODE: NORMAL
LV EF: 60 %
MCH RBC QN AUTO: 29.2 PG (ref 26–34)
MCHC RBC AUTO-ENTMCNC: 33.6 G/DL (ref 31–36)
MCV RBC AUTO: 86.9 FL (ref 80–100)
PLATELET # BLD AUTO: 184 K/UL (ref 135–450)
PMV BLD AUTO: 8.7 FL (ref 5–10.5)
POTASSIUM SERPL-SCNC: 3.9 MMOL/L (ref 3.5–5.1)
RBC # BLD AUTO: 5.15 M/UL (ref 4.2–5.9)
SODIUM SERPL-SCNC: 141 MMOL/L (ref 136–145)
WBC # BLD AUTO: 5.6 K/UL (ref 4–11)

## 2024-02-28 PROCEDURE — C1887 CATHETER, GUIDING: HCPCS

## 2024-02-28 PROCEDURE — 6360000002 HC RX W HCPCS

## 2024-02-28 PROCEDURE — C1769 GUIDE WIRE: HCPCS

## 2024-02-28 PROCEDURE — 99153 MOD SED SAME PHYS/QHP EA: CPT

## 2024-02-28 PROCEDURE — 93458 L HRT ARTERY/VENTRICLE ANGIO: CPT | Performed by: INTERNAL MEDICINE

## 2024-02-28 PROCEDURE — 6360000004 HC RX CONTRAST MEDICATION: Performed by: INTERNAL MEDICINE

## 2024-02-28 PROCEDURE — 2709999900 HC NON-CHARGEABLE SUPPLY

## 2024-02-28 PROCEDURE — 2500000003 HC RX 250 WO HCPCS

## 2024-02-28 PROCEDURE — 93458 L HRT ARTERY/VENTRICLE ANGIO: CPT

## 2024-02-28 PROCEDURE — 92978 ENDOLUMINL IVUS OCT C 1ST: CPT | Performed by: INTERNAL MEDICINE

## 2024-02-28 PROCEDURE — C1894 INTRO/SHEATH, NON-LASER: HCPCS

## 2024-02-28 PROCEDURE — 92978 ENDOLUMINL IVUS OCT C 1ST: CPT

## 2024-02-28 PROCEDURE — 99152 MOD SED SAME PHYS/QHP 5/>YRS: CPT

## 2024-02-28 PROCEDURE — 93005 ELECTROCARDIOGRAM TRACING: CPT | Performed by: INTERNAL MEDICINE

## 2024-02-28 PROCEDURE — 85027 COMPLETE CBC AUTOMATED: CPT

## 2024-02-28 PROCEDURE — 36415 COLL VENOUS BLD VENIPUNCTURE: CPT

## 2024-02-28 PROCEDURE — 80048 BASIC METABOLIC PNL TOTAL CA: CPT

## 2024-02-28 PROCEDURE — 99152 MOD SED SAME PHYS/QHP 5/>YRS: CPT | Performed by: INTERNAL MEDICINE

## 2024-02-28 PROCEDURE — 6370000000 HC RX 637 (ALT 250 FOR IP)

## 2024-02-28 RX ORDER — SODIUM CHLORIDE 9 MG/ML
INJECTION, SOLUTION INTRAVENOUS PRN
Status: DISCONTINUED | OUTPATIENT
Start: 2024-02-28 | End: 2024-02-28 | Stop reason: HOSPADM

## 2024-02-28 RX ORDER — SODIUM CHLORIDE 0.9 % (FLUSH) 0.9 %
5-40 SYRINGE (ML) INJECTION EVERY 12 HOURS SCHEDULED
Status: DISCONTINUED | OUTPATIENT
Start: 2024-02-28 | End: 2024-02-28 | Stop reason: HOSPADM

## 2024-02-28 RX ORDER — SODIUM CHLORIDE 0.9 % (FLUSH) 0.9 %
5-40 SYRINGE (ML) INJECTION PRN
Status: DISCONTINUED | OUTPATIENT
Start: 2024-02-28 | End: 2024-02-28 | Stop reason: HOSPADM

## 2024-02-28 RX ADMIN — IOPAMIDOL 63 ML: 755 INJECTION, SOLUTION INTRAVENOUS at 09:17

## 2024-02-28 NOTE — PROGRESS NOTES
Pt returned to holding area from procedure room.    Wife at bedside.  Dr. Terry talking with pt and wife.

## 2024-02-28 NOTE — OP NOTE
[facility] Operative Note     PROCEDURE SUMMARY   Procedure [facility]   Indication Abnormal calcium score   Consent Obtained   Pre-Sedation Pre-sedation note completed.  Immediately prior to procedure, patient was reassessed and pre-sedation assessment and strategy remained unchanged.    Post-Sedation After procedure, I personally supervised the patient until awake, alert, following commands, breathing independently and hemodynamically stable.   Access RRA   US US guidance used to determine artery patency, size (>2mm), anatomic variations and ideal puncture location.  Real-time US utilized concurrent with vascular needle entry into artery.  Image(s) permanently recorded and reported in chart.   Bleed Risk Low   Sedation Minimal conscious sedation for comfort.  Independent trained observer pushed medications at my direction. Level of consciousness and vital signs/physiologic status monitored throughout the procedure (see start and stop times above, as well as medication dosages).   Start Time 0833   Stop Time 0907   Versed 3mg   Fentanyl 150mcg   Contrast 63cc   Flouro 6.4min   EBL <20mL   Complicat None   Specimens None   Diagnostic Detail Patient to cath lab in postabsorptive state, informed consent obtained.  RRA prepped and draped in normal sterile fashion  Micro needle used to access artery With US guidance  Micro wire advanced into artery, 5/6 [place] advanced over wire   JL 3.5 advanced over wire to engage LM coronary artery and image in multiple views  JR4 advanced over wire to engage RCA and image in multiple views  Pigtail advancd over wire into LV and LVEDP obtained, pullback gradient obtained  Arterial hemostasis obtained with Radial band   Intervention  Detail AP/AC regimen  Heparin  Guide catheter advanced over wire into LMCA  Coronary guidewire advanced to distal artery without difficulty  IVUS performed revealing LM area 4.92 and stenosis >50%     FINDINGS   Artery Findings   LM 60%  ostial, 70% distal (72% by IVUS)   LAD 50-70% mid diffuse   Cx Small   RI Normal   RCA 80% prox, 80% distal   LVEDP 5mmHg Normal 3-12mmHg   LVG Normal with EF >55% with Normal wall motion Normal >/= 55%   AVG Normal     INTERVENTION(S)   None    POST CATH  RECOMMENDATIONS   Continued aggressive medical therapy and risk factor modification  CABG - Discussed with Dr. Mccoy

## 2024-02-28 NOTE — SEDATION DOCUMENTATION
[facility]  Pre-sedation Assessment   PROCEDURE   Planned Procedure Cardiac catheterization   Consent I have discussed with the patient and/or the patient representative the indication, alternatives, and the possible risks and/or complications of the planned procedure and the anesthesia methods. The patient and/or patient representative appear to understand and agree to proceed.   INDICATION   Chief Complaint Anginal Equivalent  Dyspnea on Exertion  Dyspnea   Presentation New Onset Angina <= 2 months, Worsening Angina, and Suspected CAD   Anginal Class (2 wks) CCS III - Symptoms with everyday living activities, i.e., moderate limitation   Anginal Symptoms Typical chest pain or anginal equivalent   CHF Class (2 wks) No symptoms   CV Instability Yes   ISCHEMIC WORKUP/MANAGEMENT/EVALUTION   Stress Test CorCa +   Anginal Meds Yes: ACE/ARB/ARNI, Aspirin, and STATIN   UPCOMING SURGERY   Valve surgery No   MEDICAL HISTORY   Vital Signs There were no vitals taken for this visit.   Allergies Reviewed in EMR   Medications Reviewed in EMR   Medical History Reviewed in EMR   Surgical History Reviewed in EMR   PRE-SEDATION   Exam I have personally completed a history, physical exam & review of systems for this patient (see notes).   Hx Anesthesia Issue None   Mod Mallampati II   ASA Class Class 2 - A normal healthy patient with mild systemic disease   SILVIA SCALE   Activity 2 - Able to move 4 extrem on command   Respiration 2 - Able to breath deeply and cough freely   Circulation 2 - BP +/- 20mmHg of normal   Consciousness 2 - Fully awake   Oxygen Saturation 2 - Able to maintain oxygen sat >92% on room air   SEDATION/ANESTHESIA PLAN   Goal Guard patient safety and welfare. Minimize physical discomfort and pain. Minimize negative psychological responses to treatment by providing sedation and analgesia and maximize the potential amnesia.  Patient to meet pre-procedure discharge plan.   Medications Midazolam

## 2024-02-28 NOTE — H&P
[facility]  H+P  Consult  OP Visit  FU Visit   CC/HPI   CC New patient visit for elevated calcium score.   Intervention None   General Doing fair.  Concerned with sob with exertion.  Denies cp.  Strong family hx of early cad in dad and brother.  Lonstanding diabetic.  Other risks include HTN, CHOL.    Recent CorCa with total 1139 (90%), high risk for at least one obstructive blockage.     Cardiac Sx +SOB   HISTORY/ALLERGY/ROS   M/S/S/F Hx Reviewed in Epic and updated as appropriate   ALLERG Lisinopril   ROS Full ROS obtained and negative except as mentioned in HPI   MEDICATIONS   Cardiac medications reviewed in [facility] during visit.   PHYSICAL EXAM   Vitals There were no vitals taken for this visit.   Gen Alert, coop, no distress Heart  RRR, no MRG   Lung CTA-B, unlabored, no DTP Extrem Edema -Grade 0 (0mm)      COMPLIANCE   Discussed and counseled on diet, exercise, weight loss, smoking, alcohol, drugs.  All questions answered.   CODING   N/A   SCRIBE ATTESTATION   Nurse I, Jeannine Nevarez RN, am scribing for and in the presence of Jessee Terry MD. 2/28/2024   Doctor Jeannine Nevarez is working as scribe for and in presence of me and may have prepopulated components of note with my historical intellectual property (IP) under my supervision.  Any additions to this IP performed in my presence and at my direction. Content and accuracy of this note reviewed by me (Jessee Terry MD).   ASSESSMENT AND PLAN   *CAD   Date EF Results   Sx   As above   CaSc 2/24  Total 1139, LM 0, , Cx 118,    Plan   High risk profile with sob - DM, HTN, CHOL, FAM  Poor candidate for CorCTA with calcium burden and bloom artifact  Discussed med v stress v LHC in detail  Patient prefers LHC, R/B/O discussed   *HTN  Status Advice Plan   Controlled Diet/salt/xcise/wt counseling Continue antihypertensives at current dosages   *CHOL  LDL Advice Plan   105 Diet/salt/xcise/wt counseling Continue MT/HI statin   *FOLLOWUP  After

## 2024-02-28 NOTE — DISCHARGE INSTRUCTIONS
Radial Angiogram      Care of your puncture site:  Remove clear bandage 24 hours after the procedure.  May shower in 24 hours  Inspect the site daily and gently clean using soap and water.  Dry thoroughly and apply a Band-Aid.    Normal Observations:  Soreness or tenderness which may last one week.  Mild oozing from the incision site.  Possible bruising that could last 2 weeks.    Activity:  You may resume driving 24 hours following the procedure.  You may resume normal activity in 3 days or after the wound heals.  Avoid lifting more than 10 pounds for 3 days with affected arm.    Nutrition:  Regular diet.  Drink at least 8 to 10 glasses of decaffeinated, non-alcoholic fluid for the next 24 hours to flush the x-ray dye used for your angiogram out of your body.    Call your doctor immediately if your condition worsens, for any other concerns, for a follow-up appointment or if you experience any of the following:  Significant bleeding that does not stop after 10 minutes of applying firm pressure on the puncture site.  Increased swelling of the wrist.  Unusual pain, numbness, or tingling of the wrist/arm.   Any signs of infection such as: redness, yellow drainage at the site, swelling or pain.    Other Instructions:  Hold Metformin or Metformin containing drugs for 48 hours after procedure.

## 2024-02-28 NOTE — PROGRESS NOTES
PRE-PROCEDURE    DATE: 2/28/2024 ARRIVAL TO CATH LAB: 7:39 AM    ADMIT SOURCE: Outpatient    ID & ALLERGY BAND: On    CONSENT: Yes    NPO SINCE: Midnight    LABS/PREGNANCY TEST: N/A    PULSES:  Right Radial Artery 3+  Right DP 2+  Right PT 2+  Left DP 2+  Left PT 2+    IV SITE : Started in Left A/C.  with fluids infusing at kvo 7:39 AM     SURGERIES PLANNED: No    BLEEDING PROBLEMS: No    BLEEDING RISK: Low Risk <2%    COMPLIANCE: Yes      PATIENT HISTORY    CHIEF COMPLAINT: Significant Family History of CAD or MI    EKG:  Yes    Pre CATH Rhythm: Normal Sinus Rhythm    STRESS TEST PREFORMED:  No    CARDIAC CTA PREFORMED:  Yes     Most recent date:  2/1/2024     Results:    Unknown    AGATSTON CORONARY CALCIUM SCORE:   Assessed: Yes     Score: 2/1/2024    Date: 1139    ECHO: DATE:  No     EF: N/A    HYPERTENSION: Yes    DYSLIPIDEMIA: Yes    FAMILY HX OF CAD: Yes    PRIOR MI: No    PRIOR PCI: No    PRIOR CABG: No    CEREBRALVASCULAR DX: No    PERIPHERAL ARTERIAL DISEASE: No    CHRONIC LUNG DISEASE: No    TOBACCO: Never    DIABETIC: Yes, Oral Treatment    CARDIAC ARREST: No    DIALYSIS: No    FRAILTY SCORE: 1 VERY FIT (robust, energetic, exercise regularly, fittest for their age)

## 2024-02-29 ENCOUNTER — OFFICE VISIT (OUTPATIENT)
Age: 59
End: 2024-02-29
Payer: COMMERCIAL

## 2024-02-29 VITALS
HEART RATE: 84 BPM | SYSTOLIC BLOOD PRESSURE: 140 MMHG | WEIGHT: 225 LBS | OXYGEN SATURATION: 96 % | BODY MASS INDEX: 31.5 KG/M2 | HEIGHT: 71 IN | DIASTOLIC BLOOD PRESSURE: 80 MMHG

## 2024-02-29 DIAGNOSIS — I25.10 CORONARY ARTERY DISEASE INVOLVING NATIVE CORONARY ARTERY OF NATIVE HEART WITHOUT ANGINA PECTORIS: Primary | ICD-10-CM

## 2024-02-29 LAB
EKG ATRIAL RATE: 88 BPM
EKG DIAGNOSIS: NORMAL
EKG P AXIS: 25 DEGREES
EKG P-R INTERVAL: 160 MS
EKG Q-T INTERVAL: 360 MS
EKG QRS DURATION: 86 MS
EKG QTC CALCULATION (BAZETT): 435 MS
EKG R AXIS: 1 DEGREES
EKG T AXIS: 47 DEGREES
EKG VENTRICULAR RATE: 88 BPM

## 2024-02-29 PROCEDURE — 3079F DIAST BP 80-89 MM HG: CPT | Performed by: THORACIC SURGERY (CARDIOTHORACIC VASCULAR SURGERY)

## 2024-02-29 PROCEDURE — 93010 ELECTROCARDIOGRAM REPORT: CPT | Performed by: INTERNAL MEDICINE

## 2024-02-29 PROCEDURE — 99205 OFFICE O/P NEW HI 60 MIN: CPT | Performed by: THORACIC SURGERY (CARDIOTHORACIC VASCULAR SURGERY)

## 2024-02-29 PROCEDURE — 3077F SYST BP >= 140 MM HG: CPT | Performed by: THORACIC SURGERY (CARDIOTHORACIC VASCULAR SURGERY)

## 2024-02-29 NOTE — PROGRESS NOTES
Department of Cardiovascular & Thoracic Surgery  Consult Note        Reason for Consult: CAD  Requesting Physician: Dr. Terry        HISTORY OF PRESENT ILLNESS:              The patient is a 58 y.o. male with cardiac risk factors of hypertension, hyperlipidemia, diabetes mellitus and strong family history of premature coronary disease who was found to have elevated calcium score.  Cardiac CT was ordered as part of screening total because of patient's history of premature coronary disease in first-degree relatives and his anxiety about this.  He denies any history of angina chest pain shortness of breath dyspnea on exertion symptoms of congestive heart failure palpitations TIA or CVA.  He does have diabetes but this has been very well-controlled according to the patient.  He is compliant with his medications.  Past Medical History:        Diagnosis Date    HTN (hypertension)     Lumbar disc herniation-back surgery twice 2004, 2007    Hyperlipidemia     Hypertension     Type II or unspecified type diabetes mellitus without mention of complication, not stated as uncontrolled 11/13    Headaches-chronic     Pituitary adenoma-being followed by Dr. 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  No imaging found to support     Hypogonadism     Vitamin D deficiency     Umbilical hernia           Past Surgical History:        Procedure Laterality Date    LUMBAR DISC SURGERY  08/2004, 02/2007    L4-L5 (twice), [place], hardware in place     Current Medications:   Aspirin 81 mg daily  Atorvastatin 20 mg daily  Farxiga 10 mg p.o. daily  Fenofibrate 160 mg p.o. daily  Losartan 25 mg daily  Glipizide 10 mg daily  Metformin 1000 mg daily with breakfast  AndroGel  Topamax 50 mg daily  Vitamin D 1000 units daily  Allergies:  Lisinopril-cough    Social History:

## 2024-03-01 ENCOUNTER — TELEPHONE (OUTPATIENT)
Dept: CARDIOTHORACIC SURGERY | Age: 59
End: 2024-03-01

## 2024-03-01 ENCOUNTER — PREP FOR PROCEDURE (OUTPATIENT)
Age: 59
End: 2024-03-01

## 2024-03-01 DIAGNOSIS — I25.10 CORONARY ARTERY DISEASE INVOLVING NATIVE CORONARY ARTERY OF NATIVE HEART WITHOUT ANGINA PECTORIS: Primary | ICD-10-CM

## 2024-03-01 DIAGNOSIS — Z01.818 PRE-OP TESTING: ICD-10-CM

## 2024-03-01 RX ORDER — SODIUM CHLORIDE 9 MG/ML
INJECTION, SOLUTION INTRAVENOUS PRN
Status: CANCELLED | OUTPATIENT
Start: 2024-03-01

## 2024-03-01 RX ORDER — SODIUM CHLORIDE 0.9 % (FLUSH) 0.9 %
5-40 SYRINGE (ML) INJECTION EVERY 12 HOURS SCHEDULED
Status: CANCELLED | OUTPATIENT
Start: 2024-03-01

## 2024-03-01 RX ORDER — CHLORHEXIDINE GLUCONATE ORAL RINSE 1.2 MG/ML
15 SOLUTION DENTAL ONCE
Status: CANCELLED | OUTPATIENT
Start: 2024-03-01 | End: 2024-03-01

## 2024-03-01 RX ORDER — PANTOPRAZOLE SODIUM 40 MG/10ML
40 INJECTION, POWDER, LYOPHILIZED, FOR SOLUTION INTRAVENOUS ONCE
Qty: 1 EACH | Refills: 0 | Status: CANCELLED
Start: 2024-03-01 | End: 2024-03-01

## 2024-03-01 RX ORDER — SODIUM CHLORIDE 0.9 % (FLUSH) 0.9 %
5-40 SYRINGE (ML) INJECTION PRN
Status: CANCELLED | OUTPATIENT
Start: 2024-03-01

## 2024-03-01 NOTE — TELEPHONE ENCOUNTER
Spoke with patient regarding surgery scheduled for 3/7/2024 at 8:30 am with arrival time of 6:30 am at [facility] with Dr. Debbie Mccoy to include: coronary artery bypass graft with possible left atrial appendage clip placement. Patient has been instructed not to eat or drink after midnight the day of surgery, that he will be getting a call from Rena to coordinate completion of pre op lab work and out patient testing and to call our office with any questions or concerns.

## 2024-03-04 ENCOUNTER — TELEPHONE (OUTPATIENT)
Age: 59
End: 2024-03-04

## 2024-03-04 NOTE — TELEPHONE ENCOUNTER
Pt called earlier to ask how many people are allowed in his room after surgery and how many people can be in the waiting room during his surgery. Questions posed to SHAI Kowalski. Response is for immediate family only and may change when transferred to CVICU and depending on his condition.   Attempted to reach pt with the above information. NA. No VM. Will try again later.

## 2024-03-05 ENCOUNTER — HOSPITAL ENCOUNTER (OUTPATIENT)
Dept: VASCULAR LAB | Age: 59
Discharge: HOME | End: 2024-03-05
Attending: THORACIC SURGERY (CARDIOTHORACIC VASCULAR SURGERY)
Payer: COMMERCIAL

## 2024-03-05 ENCOUNTER — HOSPITAL ENCOUNTER (OUTPATIENT)
Dept: CT IMAGING | Age: 59
Discharge: HOME | End: 2024-03-05
Attending: THORACIC SURGERY (CARDIOTHORACIC VASCULAR SURGERY)
Payer: COMMERCIAL

## 2024-03-05 DIAGNOSIS — I25.10 CORONARY ARTERY DISEASE INVOLVING NATIVE CORONARY ARTERY OF NATIVE HEART WITHOUT ANGINA PECTORIS: ICD-10-CM

## 2024-03-05 DIAGNOSIS — Z01.818 PRE-OP TESTING: ICD-10-CM

## 2024-03-05 PROCEDURE — 71250 CT THORAX DX C-: CPT

## 2024-03-05 PROCEDURE — 93971 EXTREMITY STUDY: CPT

## 2024-03-05 PROCEDURE — 93880 EXTRACRANIAL BILAT STUDY: CPT

## 2024-03-06 ENCOUNTER — ANESTHESIA EVENT (OUTPATIENT)
Dept: OPERATING ROOM | Age: 59
End: 2024-03-06
Payer: COMMERCIAL

## 2024-03-06 ENCOUNTER — HOSPITAL ENCOUNTER (OUTPATIENT)
Dept: PREADMISSION TESTING | Age: 59
Setting detail: SURGERY ADMIT
Discharge: HOME | End: 2024-03-10
Payer: COMMERCIAL

## 2024-03-06 VITALS
DIASTOLIC BLOOD PRESSURE: 82 MMHG | SYSTOLIC BLOOD PRESSURE: 146 MMHG | HEIGHT: 71 IN | OXYGEN SATURATION: 98 % | BODY MASS INDEX: 31.38 KG/M2 | TEMPERATURE: 97.7 F | HEART RATE: 80 BPM | RESPIRATION RATE: 16 BRPM

## 2024-03-06 LAB
ABO + RH BLD: NORMAL
ALBUMIN SERPL-MCNC: 4.7 G/DL (ref 3.4–5)
ALP SERPL-CCNC: 97 U/L (ref 40–129)
ALT SERPL-CCNC: 32 U/L (ref 10–40)
ANION GAP SERPL CALCULATED.3IONS-SCNC: 11 MMOL/L (ref 3–16)
APTT BLD: 26.6 SEC (ref 22.7–35.9)
AST SERPL-CCNC: 26 U/L (ref 15–37)
BASOPHILS # BLD: 0.1 K/UL (ref 0–0.2)
BASOPHILS NFR BLD: 2.1 %
BILIRUB DIRECT SERPL-MCNC: <0.2 MG/DL (ref 0–0.3)
BILIRUB INDIRECT SERPL-MCNC: NORMAL MG/DL (ref 0–1)
BILIRUB SERPL-MCNC: 0.5 MG/DL (ref 0–1)
BILIRUB UR QL STRIP.AUTO: NEGATIVE
BLD GP AB SCN SERPL QL: NORMAL
BUN SERPL-MCNC: 15 MG/DL (ref 7–20)
CALCIUM SERPL-MCNC: 9.5 MG/DL (ref 8.3–10.6)
CHLORIDE SERPL-SCNC: 109 MMOL/L (ref 99–110)
CLARITY UR: CLEAR
CO2 SERPL-SCNC: 19 MMOL/L (ref 21–32)
COLOR UR: YELLOW
CREAT SERPL-MCNC: 0.9 MG/DL (ref 0.9–1.3)
DEPRECATED RDW RBC AUTO: 13.7 % (ref 12.4–15.4)
EOSINOPHIL # BLD: 0.1 K/UL (ref 0–0.6)
EOSINOPHIL NFR BLD: 2.9 %
FIBRINOGEN PPP-MCNC: 155 MG/DL (ref 243–550)
GFR SERPLBLD CREATININE-BSD FMLA CKD-EPI: >60 ML/MIN/{1.73_M2}
GLUCOSE SERPL-MCNC: 125 MG/DL (ref 70–99)
GLUCOSE UR STRIP.AUTO-MCNC: >=1000 MG/DL
HCT VFR BLD AUTO: 42.3 % (ref 40.5–52.5)
HGB BLD-MCNC: 14.2 G/DL (ref 13.5–17.5)
HGB UR QL STRIP.AUTO: NEGATIVE
INR PPP: 1.17 (ref 0.84–1.16)
KETONES UR STRIP.AUTO-MCNC: NEGATIVE MG/DL
LEUKOCYTE ESTERASE UR QL STRIP.AUTO: NEGATIVE
LYMPHOCYTES # BLD: 1 K/UL (ref 1–5.1)
LYMPHOCYTES NFR BLD: 20 %
MAGNESIUM SERPL-MCNC: 2.2 MG/DL (ref 1.8–2.4)
MCH RBC QN AUTO: 29 PG (ref 26–34)
MCHC RBC AUTO-ENTMCNC: 33.6 G/DL (ref 31–36)
MCV RBC AUTO: 86.4 FL (ref 80–100)
MONOCYTES # BLD: 0.4 K/UL (ref 0–1.3)
MONOCYTES NFR BLD: 7.8 %
NEUTROPHILS # BLD: 3.3 K/UL (ref 1.7–7.7)
NEUTROPHILS NFR BLD: 67.2 %
NITRITE UR QL STRIP.AUTO: NEGATIVE
PH UR STRIP.AUTO: 6 [PH] (ref 5–8)
PHOSPHATE SERPL-MCNC: 2.6 MG/DL (ref 2.5–4.9)
PLATELET # BLD AUTO: 158 K/UL (ref 135–450)
PMV BLD AUTO: 9.3 FL (ref 5–10.5)
POTASSIUM SERPL-SCNC: 3.9 MMOL/L (ref 3.5–5.1)
PROT SERPL-MCNC: 7.1 G/DL (ref 6.4–8.2)
PROT UR STRIP.AUTO-MCNC: NEGATIVE MG/DL
PROTHROMBIN TIME: 14.9 SEC (ref 11.5–14.8)
RBC # BLD AUTO: 4.9 M/UL (ref 4.2–5.9)
SODIUM SERPL-SCNC: 139 MMOL/L (ref 136–145)
SP GR UR STRIP.AUTO: 1.01 (ref 1–1.03)
UA DIPSTICK W REFLEX MICRO PNL UR: ABNORMAL
URN SPEC COLLECT METH UR: ABNORMAL
UROBILINOGEN UR STRIP-ACNC: 0.2 E.U./DL
WBC # BLD AUTO: 4.8 K/UL (ref 4–11)

## 2024-03-06 PROCEDURE — 36415 COLL VENOUS BLD VENIPUNCTURE: CPT

## 2024-03-06 PROCEDURE — 80076 HEPATIC FUNCTION PANEL: CPT

## 2024-03-06 PROCEDURE — 83036 HEMOGLOBIN GLYCOSYLATED A1C: CPT

## 2024-03-06 PROCEDURE — 81003 URINALYSIS AUTO W/O SCOPE: CPT

## 2024-03-06 PROCEDURE — 87086 URINE CULTURE/COLONY COUNT: CPT

## 2024-03-06 PROCEDURE — 86850 RBC ANTIBODY SCREEN: CPT

## 2024-03-06 PROCEDURE — 86901 BLOOD TYPING SEROLOGIC RH(D): CPT

## 2024-03-06 PROCEDURE — 85730 THROMBOPLASTIN TIME PARTIAL: CPT

## 2024-03-06 PROCEDURE — 85025 COMPLETE CBC W/AUTO DIFF WBC: CPT

## 2024-03-06 PROCEDURE — 80069 RENAL FUNCTION PANEL: CPT

## 2024-03-06 PROCEDURE — 85610 PROTHROMBIN TIME: CPT

## 2024-03-06 PROCEDURE — 83735 ASSAY OF MAGNESIUM: CPT

## 2024-03-06 PROCEDURE — 86900 BLOOD TYPING SEROLOGIC ABO: CPT

## 2024-03-06 PROCEDURE — 85384 FIBRINOGEN ACTIVITY: CPT

## 2024-03-06 ASSESSMENT — PAIN SCALES - GENERAL: PAINLEVEL_OUTOF10: 0

## 2024-03-06 NOTE — PROGRESS NOTES
Pt here for PAT visit.  Consents for procedure and blood signed by pt and in chart.  Labs drawn and urine collected and sent to lab for testing as ordered.  Pt seen by Dr. Serna from Anesthesia and questions answered.  Vitals stable and documented in flowsheet.  Pt instructed to be NPO after midnight prior to procedure and states understanding.  Pt provided with hibiclens and instructed to shower with entire bottle the night prior to procedure.  Pt instructed to take the following medications the morning of procedure with a small sip of water: NA.  Pt instructed to stop taking NA prior to procedure on NA and states understanding.  EKG completed and results in chart.  Pt instructed to arrive to the hospital the morning of procedure at 0630 on 3/7/24.   Pt in good condition and okay for discharge.  Pt denies further questions at time of discharge.  Pt instructed to call Dr. Mccoy's office with any medical concerns or the heart office at [phone] with any further questions between now and day of procedure.

## 2024-03-06 NOTE — ANESTHESIA PRE PROCEDURE
21 02/28/2024 07:42 AM    BUN 13 02/28/2024 07:42 AM    CREATININE 1.0 02/28/2024 07:42 AM    GFRAA >60 07/26/2022 07:00 AM    GFRAA >60 02/21/2011 09:52 AM    AGRATIO 2.0 11/27/2023 06:48 AM    LABGLOM >60 02/28/2024 07:42 AM    GLUCOSE 129 02/28/2024 07:42 AM    PROT 6.9 11/27/2023 06:48 AM    PROT 7.6 02/21/2011 09:52 AM    CALCIUM 9.7 02/28/2024 07:42 AM    BILITOT 0.4 11/27/2023 06:48 AM    ALKPHOS 77 11/27/2023 06:48 AM    AST 24 11/27/2023 06:48 AM    ALT 31 11/27/2023 06:48 AM       POC Tests: No results for input(s): \"POCGLU\", \"POCNA\", \"POCK\", \"POCCL\", \"POCBUN\", \"POCHEMO\", \"POCHCT\" in the last 72 hours.    Coags: No results found for: \"PROTIME\", \"INR\", \"APTT\"    HCG (If Applicable): No results found for: \"PREGTESTUR\", \"PREGSERUM\", \"HCG\", \"HCGQUANT\"     ABGs: No results found for: \"PHART\", \"PO2ART\", \"WCO6BVX\", \"DGT4ZAS\", \"BEART\", \"B2VHQSIY\"     Type & Screen (If Applicable):  No results found for: \"LABABO\", \"LABRH\"    Drug/Infectious Status (If Applicable):  No results found for: \"HIV\", \"HEPCAB\"    COVID-19 Screening (If Applicable): No results found for: \"COVID19\"        Anesthesia Evaluation  Patient summary reviewed and Nursing notes reviewed   no history of anesthetic complications:   Airway: Mallampati: I  TM distance: >3 FB   Neck ROM: full  Mouth opening: > = 3 FB   Dental: normal exam         Pulmonary:normal exam    (+)   shortness of breath:                                    Cardiovascular:  Exercise tolerance: poor (<4 METS)  (+) hypertension:, angina:, CAD: obstructive, BO:, hyperlipidemia        Rhythm: regular  Rate: normal                 ROS comment:    Component  Ref Range & Units 2/28/24 0921  Left Ventricular Ejection Fraction  % 60      Cardiac Catheretization: Personally reviewed.  Distal left main 70 to 80%.  Mid LAD 50% lesion large ramus and circumflex small after this.  RCA 90% mid lesion  FINDINGS  LM  60% ostial, 70% distal (72% by IVUS)  LAD  50-70% mid

## 2024-03-07 ENCOUNTER — ANESTHESIA (OUTPATIENT)
Dept: OPERATING ROOM | Age: 59
End: 2024-03-07
Payer: COMMERCIAL

## 2024-03-07 ENCOUNTER — TELEPHONE (OUTPATIENT)
Dept: CARDIOTHORACIC SURGERY | Age: 59
End: 2024-03-07

## 2024-03-07 ENCOUNTER — HOSPITAL ENCOUNTER (OUTPATIENT)
Age: 59
Discharge: HOME | End: 2024-03-07
Attending: THORACIC SURGERY (CARDIOTHORACIC VASCULAR SURGERY) | Admitting: THORACIC SURGERY (CARDIOTHORACIC VASCULAR SURGERY)
Payer: COMMERCIAL

## 2024-03-07 PROBLEM — I25.10 CAD IN NATIVE ARTERY: Status: ACTIVE | Noted: 2024-03-07

## 2024-03-07 LAB
BACTERIA UR CULT: NORMAL
EST. AVERAGE GLUCOSE BLD GHB EST-MCNC: 108.3 MG/DL
HBA1C MFR BLD: 5.4 %

## 2024-03-07 PROCEDURE — 2500000003 HC RX 250 WO HCPCS: Performed by: THORACIC SURGERY (CARDIOTHORACIC VASCULAR SURGERY)

## 2024-03-07 PROCEDURE — 6370000000 HC RX 637 (ALT 250 FOR IP): Performed by: THORACIC SURGERY (CARDIOTHORACIC VASCULAR SURGERY)

## 2024-03-07 PROCEDURE — 2580000003 HC RX 258: Performed by: THORACIC SURGERY (CARDIOTHORACIC VASCULAR SURGERY)

## 2024-03-07 RX ORDER — SODIUM CHLORIDE 9 MG/ML
INJECTION, SOLUTION INTRAVENOUS PRN
Status: DISCONTINUED | OUTPATIENT
Start: 2024-03-07 | End: 2024-03-17 | Stop reason: HOSPADM

## 2024-03-07 RX ORDER — SODIUM CHLORIDE 0.9 % (FLUSH) 0.9 %
5-40 SYRINGE (ML) INJECTION EVERY 12 HOURS SCHEDULED
Status: DISCONTINUED | OUTPATIENT
Start: 2024-03-07 | End: 2024-03-17 | Stop reason: HOSPADM

## 2024-03-07 RX ORDER — CARDIOPLEGIC SOLUTION NO.16 26/1052.8
PLASTIC BAG, PERFUSION (ML) PERFUSION CONTINUOUS
Status: DISCONTINUED | OUTPATIENT
Start: 2024-03-07 | End: 2024-03-17 | Stop reason: HOSPADM

## 2024-03-07 RX ORDER — SODIUM CHLORIDE 0.9 % (FLUSH) 0.9 %
5-40 SYRINGE (ML) INJECTION PRN
Status: DISCONTINUED | OUTPATIENT
Start: 2024-03-07 | End: 2024-03-17 | Stop reason: HOSPADM

## 2024-03-07 RX ADMIN — METOPROLOL TARTRATE 12.5 MG: 25 TABLET, FILM COATED ORAL at 06:55

## 2024-03-07 NOTE — TELEPHONE ENCOUNTER
Spoke with patient regarding reschedule of surgery from this morning to 3/20/2024 at 7:30 am with arrival time of 5:30 am due to unforeseen circumstances. Patient has been instructed not to eat or drink after midnight the morning of surgery and to call our office with any questions or concerns.

## 2024-03-07 NOTE — PROGRESS NOTES
Patient arrived @ 0635. Changed into his gown. #20 Iv started in left left arm without difficulty.  At 0723 the case was canceled due to illness of surgeon. Dr. Brothers talked with the patient and his family. IV d/c and patient was discharged.

## 2024-03-08 ENCOUNTER — TELEPHONE (OUTPATIENT)
Dept: FAMILY MEDICINE CLINIC | Age: 59
End: 2024-03-08

## 2024-03-08 DIAGNOSIS — E11.9 TYPE 2 DIABETES MELLITUS WITHOUT COMPLICATION, WITHOUT LONG-TERM CURRENT USE OF INSULIN (HCC): ICD-10-CM

## 2024-03-08 DIAGNOSIS — D35.2 PITUITARY ADENOMA (HCC): Primary | ICD-10-CM

## 2024-03-08 DIAGNOSIS — E29.1 HYPOGONADISM MALE: ICD-10-CM

## 2024-03-08 NOTE — TELEPHONE ENCOUNTER
Pt called in requesting a referral to the following provider. He is established with this physician for two years but insurance is now refusing to pay w/out a referral.     Please advise and thank you.       Physician:Marlene Barbour MD    [facility]  [address] [place] [address], [address], [place] [id number]

## 2024-03-13 ENCOUNTER — TELEPHONE (OUTPATIENT)
Age: 59
End: 2024-03-13

## 2024-03-13 NOTE — TELEPHONE ENCOUNTER
Pt called to request refill of Hibiclens soap for pre op. Previous bottled used and surgery was cancelled. Spoke with SHAI Santos. Pt will have a bottle left at the main lobby desk at [facility]. Pt notified and agrees with plan.

## 2024-03-19 ENCOUNTER — PREP FOR PROCEDURE (OUTPATIENT)
Age: 59
End: 2024-03-19

## 2024-03-19 RX ORDER — SODIUM CHLORIDE 0.9 % (FLUSH) 0.9 %
5-40 SYRINGE (ML) INJECTION EVERY 12 HOURS SCHEDULED
Status: CANCELLED | OUTPATIENT
Start: 2024-03-19

## 2024-03-19 RX ORDER — SODIUM CHLORIDE 9 MG/ML
INJECTION, SOLUTION INTRAVENOUS PRN
Status: CANCELLED | OUTPATIENT
Start: 2024-03-19

## 2024-03-19 RX ORDER — SODIUM CHLORIDE 0.9 % (FLUSH) 0.9 %
5-40 SYRINGE (ML) INJECTION PRN
Status: CANCELLED | OUTPATIENT
Start: 2024-03-19

## 2024-03-20 ENCOUNTER — HOSPITAL ENCOUNTER (INPATIENT)
Age: 59
LOS: 4 days | Discharge: HOME HEALTH | DRG: 236 | End: 2024-03-24
Attending: THORACIC SURGERY (CARDIOTHORACIC VASCULAR SURGERY) | Admitting: THORACIC SURGERY (CARDIOTHORACIC VASCULAR SURGERY)
Payer: COMMERCIAL

## 2024-03-20 ENCOUNTER — APPOINTMENT (OUTPATIENT)
Dept: GENERAL RADIOLOGY | Age: 59
DRG: 236 | End: 2024-03-20
Attending: THORACIC SURGERY (CARDIOTHORACIC VASCULAR SURGERY)
Payer: COMMERCIAL

## 2024-03-20 DIAGNOSIS — Z95.1 S/P CABG (CORONARY ARTERY BYPASS GRAFT): Primary | ICD-10-CM

## 2024-03-20 LAB
ABO + RH BLD: NORMAL
ACTIVATED CLOTTING TIME: 111 SEC (ref 99–130)
ACTIVATED CLOTTING TIME: 118 SEC (ref 99–130)
ACTIVATED CLOTTING TIME: 179 SEC (ref 99–130)
ACTIVATED CLOTTING TIME: 527 SEC (ref 99–130)
ACTIVATED CLOTTING TIME: 723 SEC (ref 99–130)
ACTIVATED CLOTTING TIME: 948 SEC (ref 99–130)
ANION GAP SERPL CALCULATED.3IONS-SCNC: 11 MMOL/L (ref 3–16)
APTT BLD: 27.8 SEC (ref 22.7–35.9)
BASE EXCESS BLDA CALC-SCNC: -2 MMOL/L (ref -3–3)
BASE EXCESS BLDA CALC-SCNC: -3 MMOL/L (ref -3–3)
BASE EXCESS BLDA CALC-SCNC: -3 MMOL/L (ref -3–3)
BASE EXCESS BLDA CALC-SCNC: -4 MMOL/L (ref -3–3)
BASE EXCESS BLDA CALC-SCNC: -4 MMOL/L (ref -3–3)
BASE EXCESS BLDA CALC-SCNC: -6 MMOL/L (ref -3–3)
BASE EXCESS BLDA CALC-SCNC: -6 MMOL/L (ref -3–3)
BASE EXCESS BLDA CALC-SCNC: -7 MMOL/L (ref -3–3)
BASE EXCESS BLDA CALC-SCNC: -8 MMOL/L (ref -3–3)
BASE EXCESS BLDA CALC-SCNC: -9 MMOL/L (ref -3–3)
BASE EXCESS BLDA CALC-SCNC: -9 MMOL/L (ref -3–3)
BLD GP AB SCN SERPL QL: NORMAL
BLOOD BANK DISPENSE STATUS: NORMAL
BLOOD BANK PRODUCT CODE: NORMAL
BPU ID: NORMAL
BUN SERPL-MCNC: 12 MG/DL (ref 7–20)
CA-I BLD-SCNC: 1.06 MMOL/L (ref 1.12–1.32)
CA-I BLD-SCNC: 1.1 MMOL/L (ref 1.12–1.32)
CA-I BLD-SCNC: 1.1 MMOL/L (ref 1.12–1.32)
CA-I BLD-SCNC: 1.11 MMOL/L (ref 1.12–1.32)
CA-I BLD-SCNC: 1.12 MMOL/L (ref 1.12–1.32)
CA-I BLD-SCNC: 1.14 MMOL/L (ref 1.12–1.32)
CA-I BLD-SCNC: 1.21 MMOL/L (ref 1.12–1.32)
CA-I BLD-SCNC: 1.23 MMOL/L (ref 1.12–1.32)
CA-I BLD-SCNC: 1.32 MMOL/L (ref 1.12–1.32)
CALCIUM SERPL-MCNC: 7.9 MG/DL (ref 8.3–10.6)
CHLORIDE SERPL-SCNC: 115 MMOL/L (ref 99–110)
CO2 BLDA-SCNC: 18 MMOL/L
CO2 BLDA-SCNC: 19 MMOL/L
CO2 BLDA-SCNC: 20 MMOL/L
CO2 BLDA-SCNC: 21 MMOL/L
CO2 BLDA-SCNC: 22 MMOL/L
CO2 BLDA-SCNC: 23 MMOL/L
CO2 BLDA-SCNC: 24 MMOL/L
CO2 BLDA-SCNC: 25 MMOL/L
CO2 BLDA-SCNC: 25 MMOL/L
CO2 SERPL-SCNC: 21 MMOL/L (ref 21–32)
CREAT SERPL-MCNC: 0.8 MG/DL (ref 0.9–1.3)
DEPRECATED RDW RBC AUTO: 13.5 % (ref 12.4–15.4)
DESCRIPTION BLOOD BANK: NORMAL
FIBRINOGEN PPP-MCNC: 86 MG/DL (ref 243–550)
GFR SERPLBLD CREATININE-BSD FMLA CKD-EPI: >60 ML/MIN/{1.73_M2}
GLUCOSE BLD-MCNC: 100 MG/DL (ref 70–99)
GLUCOSE BLD-MCNC: 106 MG/DL (ref 70–99)
GLUCOSE BLD-MCNC: 111 MG/DL (ref 70–99)
GLUCOSE BLD-MCNC: 114 MG/DL (ref 70–99)
GLUCOSE BLD-MCNC: 116 MG/DL (ref 70–99)
GLUCOSE BLD-MCNC: 119 MG/DL (ref 70–99)
GLUCOSE BLD-MCNC: 133 MG/DL (ref 70–99)
GLUCOSE BLD-MCNC: 146 MG/DL (ref 70–99)
GLUCOSE BLD-MCNC: 149 MG/DL (ref 70–99)
GLUCOSE BLD-MCNC: 157 MG/DL (ref 70–99)
GLUCOSE BLD-MCNC: 173 MG/DL (ref 70–99)
GLUCOSE BLD-MCNC: 181 MG/DL (ref 70–99)
GLUCOSE BLD-MCNC: 182 MG/DL (ref 70–99)
GLUCOSE BLD-MCNC: 212 MG/DL (ref 70–99)
GLUCOSE BLD-MCNC: 99 MG/DL (ref 70–99)
GLUCOSE SERPL-MCNC: 129 MG/DL (ref 70–99)
HCO3 BLDA-SCNC: 17.1 MMOL/L (ref 21–29)
HCO3 BLDA-SCNC: 18.1 MMOL/L (ref 21–29)
HCO3 BLDA-SCNC: 18.8 MMOL/L (ref 21–29)
HCO3 BLDA-SCNC: 19.6 MMOL/L (ref 21–29)
HCO3 BLDA-SCNC: 19.8 MMOL/L (ref 21–29)
HCO3 BLDA-SCNC: 19.9 MMOL/L (ref 21–29)
HCO3 BLDA-SCNC: 21.2 MMOL/L (ref 21–29)
HCO3 BLDA-SCNC: 21.9 MMOL/L (ref 21–29)
HCO3 BLDA-SCNC: 22.5 MMOL/L (ref 21–29)
HCO3 BLDA-SCNC: 23.4 MMOL/L (ref 21–29)
HCO3 BLDA-SCNC: 23.6 MMOL/L (ref 21–29)
HCT VFR BLD AUTO: 29 % (ref 40.5–52.5)
HCT VFR BLD AUTO: 29 % (ref 40.5–52.5)
HCT VFR BLD AUTO: 30 % (ref 40.5–52.5)
HCT VFR BLD AUTO: 30 % (ref 40.5–52.5)
HCT VFR BLD AUTO: 32 % (ref 40.5–52.5)
HCT VFR BLD AUTO: 33 % (ref 40.5–52.5)
HCT VFR BLD AUTO: 34 % (ref 40.5–52.5)
HCT VFR BLD AUTO: 34 % (ref 40.5–52.5)
HCT VFR BLD AUTO: 36 % (ref 40.5–52.5)
HCT VFR BLD AUTO: 36 % (ref 40.5–52.5)
HCT VFR BLD AUTO: 37.3 % (ref 40.5–52.5)
HGB BLD CALC-MCNC: 10.2 GM/DL (ref 13.5–17.5)
HGB BLD CALC-MCNC: 10.3 GM/DL (ref 13.5–17.5)
HGB BLD CALC-MCNC: 10.7 GM/DL (ref 13.5–17.5)
HGB BLD CALC-MCNC: 11.1 GM/DL (ref 13.5–17.5)
HGB BLD CALC-MCNC: 11.6 GM/DL (ref 13.5–17.5)
HGB BLD CALC-MCNC: 11.7 GM/DL (ref 13.5–17.5)
HGB BLD CALC-MCNC: 12.2 GM/DL (ref 13.5–17.5)
HGB BLD CALC-MCNC: 12.4 GM/DL (ref 13.5–17.5)
HGB BLD CALC-MCNC: 9.9 GM/DL (ref 13.5–17.5)
HGB BLD CALC-MCNC: 9.9 GM/DL (ref 13.5–17.5)
HGB BLD-MCNC: 12.8 G/DL (ref 13.5–17.5)
INR PPP: 1.61 (ref 0.84–1.16)
LACTATE BLD-SCNC: 1.59 MMOL/L (ref 0.4–2)
LACTATE BLD-SCNC: 1.72 MMOL/L (ref 0.4–2)
LACTATE BLD-SCNC: 1.8 MMOL/L (ref 0.4–2)
LACTATE BLD-SCNC: 1.89 MMOL/L (ref 0.4–2)
LACTATE BLD-SCNC: 1.92 MMOL/L (ref 0.4–2)
LACTATE BLD-SCNC: 2.56 MMOL/L (ref 0.4–2)
LACTATE BLD-SCNC: 2.6 MMOL/L (ref 0.4–2)
LACTATE BLD-SCNC: 2.62 MMOL/L (ref 0.4–2)
LACTATE BLD-SCNC: 3.13 MMOL/L (ref 0.4–2)
MCH RBC QN AUTO: 29.7 PG (ref 26–34)
MCHC RBC AUTO-ENTMCNC: 34.4 G/DL (ref 31–36)
MCV RBC AUTO: 86.4 FL (ref 80–100)
PCO2 BLDA: 33.8 MM HG (ref 35–45)
PCO2 BLDA: 34.5 MM HG (ref 35–45)
PCO2 BLDA: 35 MM HG (ref 35–45)
PCO2 BLDA: 35.4 MM HG (ref 35–45)
PCO2 BLDA: 38.4 MM HG (ref 35–45)
PCO2 BLDA: 38.7 MM HG (ref 35–45)
PCO2 BLDA: 39.7 MM HG (ref 35–45)
PCO2 BLDA: 39.7 MM HG (ref 35–45)
PCO2 BLDA: 42.3 MM HG (ref 35–45)
PCO2 BLDA: 45 MM HG (ref 35–45)
PCO2 BLDA: 46.1 MM HG (ref 35–45)
PERFORMED ON: ABNORMAL
PERFORMED ON: NORMAL
PH BLDA: 7.24 [PH] (ref 7.35–7.45)
PH BLDA: 7.29 [PH] (ref 7.35–7.45)
PH BLDA: 7.3 [PH] (ref 7.35–7.45)
PH BLDA: 7.31 [PH] (ref 7.35–7.45)
PH BLDA: 7.31 [PH] (ref 7.35–7.45)
PH BLDA: 7.33 [PH] (ref 7.35–7.45)
PH BLDA: 7.36 [PH] (ref 7.35–7.45)
PH BLDA: 7.38 [PH] (ref 7.35–7.45)
PLATELET # BLD AUTO: 124 K/UL (ref 135–450)
PMV BLD AUTO: 8.4 FL (ref 5–10.5)
PO2 BLDA: 114 MM HG (ref 75–108)
PO2 BLDA: 126.1 MM HG (ref 75–108)
PO2 BLDA: 155 MM HG (ref 75–108)
PO2 BLDA: 196.7 MM HG (ref 75–108)
PO2 BLDA: 270 MM HG (ref 75–108)
PO2 BLDA: 295.8 MM HG (ref 75–108)
PO2 BLDA: 329.6 MM HG (ref 75–108)
PO2 BLDA: 382.5 MM HG (ref 75–108)
PO2 BLDA: 384.6 MM HG (ref 75–108)
PO2 BLDA: 520.3 MM HG (ref 75–108)
PO2 BLDA: 77.7 MM HG (ref 75–108)
POC SAMPLE TYPE: ABNORMAL
POTASSIUM BLD-SCNC: 3.2 MMOL/L (ref 3.5–5.1)
POTASSIUM BLD-SCNC: 3.4 MMOL/L (ref 3.5–5.1)
POTASSIUM BLD-SCNC: 3.6 MMOL/L (ref 3.5–5.1)
POTASSIUM BLD-SCNC: 3.7 MMOL/L (ref 3.5–5.1)
POTASSIUM BLD-SCNC: 3.9 MMOL/L (ref 3.5–5.1)
POTASSIUM BLD-SCNC: 4 MMOL/L (ref 3.5–5.1)
POTASSIUM BLD-SCNC: 4.2 MMOL/L (ref 3.5–5.1)
POTASSIUM BLD-SCNC: 4.3 MMOL/L (ref 3.5–5.1)
POTASSIUM BLD-SCNC: 4.5 MMOL/L (ref 3.5–5.1)
POTASSIUM BLD-SCNC: 4.8 MMOL/L (ref 3.5–5.1)
POTASSIUM SERPL-SCNC: 3.5 MMOL/L (ref 3.5–5.1)
PROTHROMBIN TIME: 19.1 SEC (ref 11.5–14.8)
RBC # BLD AUTO: 4.32 M/UL (ref 4.2–5.9)
SAO2 % BLDA: 100 % (ref 93–100)
SAO2 % BLDA: 94 % (ref 93–100)
SAO2 % BLDA: 98 % (ref 93–100)
SAO2 % BLDA: 99 % (ref 93–100)
SAO2 % BLDA: 99 % (ref 93–100)
SODIUM BLD-SCNC: 139 MMOL/L (ref 136–145)
SODIUM BLD-SCNC: 139 MMOL/L (ref 136–145)
SODIUM BLD-SCNC: 141 MMOL/L (ref 136–145)
SODIUM BLD-SCNC: 141 MMOL/L (ref 136–145)
SODIUM BLD-SCNC: 142 MMOL/L (ref 136–145)
SODIUM BLD-SCNC: 142 MMOL/L (ref 136–145)
SODIUM BLD-SCNC: 146 MMOL/L (ref 136–145)
SODIUM BLD-SCNC: 149 MMOL/L (ref 136–145)
SODIUM BLD-SCNC: 150 MMOL/L (ref 136–145)
SODIUM SERPL-SCNC: 147 MMOL/L (ref 136–145)
WBC # BLD AUTO: 16 K/UL (ref 4–11)

## 2024-03-20 PROCEDURE — 2709999900 HC NON-CHARGEABLE SUPPLY: Performed by: THORACIC SURGERY (CARDIOTHORACIC VASCULAR SURGERY)

## 2024-03-20 PROCEDURE — 2580000003 HC RX 258: Performed by: PHYSICIAN ASSISTANT

## 2024-03-20 PROCEDURE — 3700000001 HC ADD 15 MINUTES (ANESTHESIA): Performed by: THORACIC SURGERY (CARDIOTHORACIC VASCULAR SURGERY)

## 2024-03-20 PROCEDURE — 85384 FIBRINOGEN ACTIVITY: CPT

## 2024-03-20 PROCEDURE — 6370000000 HC RX 637 (ALT 250 FOR IP): Performed by: PHYSICIAN ASSISTANT

## 2024-03-20 PROCEDURE — 86901 BLOOD TYPING SEROLOGIC RH(D): CPT

## 2024-03-20 PROCEDURE — 7100000011 HC PHASE II RECOVERY - ADDTL 15 MIN

## 2024-03-20 PROCEDURE — 84132 ASSAY OF SERUM POTASSIUM: CPT

## 2024-03-20 PROCEDURE — 06BP4ZZ EXCISION OF RIGHT SAPHENOUS VEIN, PERCUTANEOUS ENDOSCOPIC APPROACH: ICD-10-PCS | Performed by: THORACIC SURGERY (CARDIOTHORACIC VASCULAR SURGERY)

## 2024-03-20 PROCEDURE — 85027 COMPLETE CBC AUTOMATED: CPT

## 2024-03-20 PROCEDURE — 021109W BYPASS CORONARY ARTERY, TWO ARTERIES FROM AORTA WITH AUTOLOGOUS VENOUS TISSUE, OPEN APPROACH: ICD-10-PCS | Performed by: THORACIC SURGERY (CARDIOTHORACIC VASCULAR SURGERY)

## 2024-03-20 PROCEDURE — 85610 PROTHROMBIN TIME: CPT

## 2024-03-20 PROCEDURE — 86900 BLOOD TYPING SEROLOGIC ABO: CPT

## 2024-03-20 PROCEDURE — 3600000008 HC SURGERY OHS BASE: Performed by: THORACIC SURGERY (CARDIOTHORACIC VASCULAR SURGERY)

## 2024-03-20 PROCEDURE — 84295 ASSAY OF SERUM SODIUM: CPT

## 2024-03-20 PROCEDURE — 2580000003 HC RX 258: Performed by: THORACIC SURGERY (CARDIOTHORACIC VASCULAR SURGERY)

## 2024-03-20 PROCEDURE — 82947 ASSAY GLUCOSE BLOOD QUANT: CPT

## 2024-03-20 PROCEDURE — P9045 ALBUMIN (HUMAN), 5%, 250 ML: HCPCS | Performed by: ANESTHESIOLOGY

## 2024-03-20 PROCEDURE — 85347 COAGULATION TIME ACTIVATED: CPT

## 2024-03-20 PROCEDURE — 85014 HEMATOCRIT: CPT

## 2024-03-20 PROCEDURE — C1751 CATH, INF, PER/CENT/MIDLINE: HCPCS | Performed by: THORACIC SURGERY (CARDIOTHORACIC VASCULAR SURGERY)

## 2024-03-20 PROCEDURE — 5A1221Z PERFORMANCE OF CARDIAC OUTPUT, CONTINUOUS: ICD-10-PCS | Performed by: THORACIC SURGERY (CARDIOTHORACIC VASCULAR SURGERY)

## 2024-03-20 PROCEDURE — C1729 CATH, DRAINAGE: HCPCS | Performed by: THORACIC SURGERY (CARDIOTHORACIC VASCULAR SURGERY)

## 2024-03-20 PROCEDURE — 2580000003 HC RX 258: Performed by: ANESTHESIOLOGY

## 2024-03-20 PROCEDURE — C1713 ANCHOR/SCREW BN/BN,TIS/BN: HCPCS | Performed by: THORACIC SURGERY (CARDIOTHORACIC VASCULAR SURGERY)

## 2024-03-20 PROCEDURE — 6360000002 HC RX W HCPCS: Performed by: THORACIC SURGERY (CARDIOTHORACIC VASCULAR SURGERY)

## 2024-03-20 PROCEDURE — 02100Z9 BYPASS CORONARY ARTERY, ONE ARTERY FROM LEFT INTERNAL MAMMARY, OPEN APPROACH: ICD-10-PCS | Performed by: THORACIC SURGERY (CARDIOTHORACIC VASCULAR SURGERY)

## 2024-03-20 PROCEDURE — 86850 RBC ANTIBODY SCREEN: CPT

## 2024-03-20 PROCEDURE — 33508 ENDOSCOPIC VEIN HARVEST: CPT | Performed by: THORACIC SURGERY (CARDIOTHORACIC VASCULAR SURGERY)

## 2024-03-20 PROCEDURE — 6360000002 HC RX W HCPCS: Performed by: PHYSICIAN ASSISTANT

## 2024-03-20 PROCEDURE — 6370000000 HC RX 637 (ALT 250 FOR IP): Performed by: THORACIC SURGERY (CARDIOTHORACIC VASCULAR SURGERY)

## 2024-03-20 PROCEDURE — 86923 COMPATIBILITY TEST ELECTRIC: CPT

## 2024-03-20 PROCEDURE — P9045 ALBUMIN (HUMAN), 5%, 250 ML: HCPCS | Performed by: PHYSICIAN ASSISTANT

## 2024-03-20 PROCEDURE — 7100000010 HC PHASE II RECOVERY - FIRST 15 MIN

## 2024-03-20 PROCEDURE — 3600000018 HC SURGERY OHS ADDTL 15MIN: Performed by: THORACIC SURGERY (CARDIOTHORACIC VASCULAR SURGERY)

## 2024-03-20 PROCEDURE — 33518 CABG ARTERY-VEIN TWO: CPT | Performed by: THORACIC SURGERY (CARDIOTHORACIC VASCULAR SURGERY)

## 2024-03-20 PROCEDURE — 2500000003 HC RX 250 WO HCPCS: Performed by: ANESTHESIOLOGY

## 2024-03-20 PROCEDURE — B24BZZ4 ULTRASONOGRAPHY OF HEART WITH AORTA, TRANSESOPHAGEAL: ICD-10-PCS | Performed by: THORACIC SURGERY (CARDIOTHORACIC VASCULAR SURGERY)

## 2024-03-20 PROCEDURE — 2580000003 HC RX 258

## 2024-03-20 PROCEDURE — 82803 BLOOD GASES ANY COMBINATION: CPT

## 2024-03-20 PROCEDURE — 94002 VENT MGMT INPAT INIT DAY: CPT

## 2024-03-20 PROCEDURE — 3700000000 HC ANESTHESIA ATTENDED CARE: Performed by: THORACIC SURGERY (CARDIOTHORACIC VASCULAR SURGERY)

## 2024-03-20 PROCEDURE — 80048 BASIC METABOLIC PNL TOTAL CA: CPT

## 2024-03-20 PROCEDURE — 85730 THROMBOPLASTIN TIME PARTIAL: CPT

## 2024-03-20 PROCEDURE — 71045 X-RAY EXAM CHEST 1 VIEW: CPT

## 2024-03-20 PROCEDURE — 82330 ASSAY OF CALCIUM: CPT

## 2024-03-20 PROCEDURE — P9016 RBC LEUKOCYTES REDUCED: HCPCS

## 2024-03-20 PROCEDURE — 83605 ASSAY OF LACTIC ACID: CPT

## 2024-03-20 PROCEDURE — 33533 CABG ARTERIAL SINGLE: CPT | Performed by: THORACIC SURGERY (CARDIOTHORACIC VASCULAR SURGERY)

## 2024-03-20 PROCEDURE — 6370000000 HC RX 637 (ALT 250 FOR IP): Performed by: ANESTHESIOLOGY

## 2024-03-20 PROCEDURE — 2500000003 HC RX 250 WO HCPCS: Performed by: THORACIC SURGERY (CARDIOTHORACIC VASCULAR SURGERY)

## 2024-03-20 PROCEDURE — 2720000010 HC SURG SUPPLY STERILE: Performed by: THORACIC SURGERY (CARDIOTHORACIC VASCULAR SURGERY)

## 2024-03-20 PROCEDURE — 6360000002 HC RX W HCPCS: Performed by: ANESTHESIOLOGY

## 2024-03-20 PROCEDURE — 94760 N-INVAS EAR/PLS OXIMETRY 1: CPT

## 2024-03-20 PROCEDURE — 2100000000 HC CCU R&B

## 2024-03-20 PROCEDURE — 2000000000 HC ICU R&B

## 2024-03-20 DEVICE — IMPLANTABLE DEVICE: Type: IMPLANTABLE DEVICE | Site: STERNUM | Status: FUNCTIONAL

## 2024-03-20 DEVICE — PLATE BNE 4 H BX SHP STERNALOCK BLU PRI CLSR SYS: Type: IMPLANTABLE DEVICE | Site: STERNUM | Status: FUNCTIONAL

## 2024-03-20 DEVICE — CABLE STRNL TAPR 3 BLNT 1 CUT EDGE NDL SS: Type: IMPLANTABLE DEVICE | Site: STERNUM | Status: FUNCTIONAL

## 2024-03-20 DEVICE — PLATE BNE 8 H JL SHP STERNALOCK BLU PRI CLSR SYS: Type: IMPLANTABLE DEVICE | Site: STERNUM | Status: FUNCTIONAL

## 2024-03-20 DEVICE — CLIP INT SM WIDE WECK RED TI TRNSVRS GRV CHEVRON SHP W/ PERCIS TIP 24 PER PK: Type: IMPLANTABLE DEVICE | Site: HEART | Status: FUNCTIONAL

## 2024-03-20 DEVICE — PLATE BNE 8 H X SHP STERNALOCK BLU PRI CLSR SYS: Type: IMPLANTABLE DEVICE | Site: STERNUM | Status: FUNCTIONAL

## 2024-03-20 RX ORDER — OXYCODONE HYDROCHLORIDE 5 MG/1
5 TABLET ORAL EVERY 4 HOURS PRN
Status: DISCONTINUED | OUTPATIENT
Start: 2024-03-20 | End: 2024-03-23

## 2024-03-20 RX ORDER — KETOROLAC TROMETHAMINE 15 MG/ML
15 INJECTION, SOLUTION INTRAMUSCULAR; INTRAVENOUS EVERY 6 HOURS
Status: DISCONTINUED | OUTPATIENT
Start: 2024-03-20 | End: 2024-03-20

## 2024-03-20 RX ORDER — PANTOPRAZOLE SODIUM 40 MG/1
40 TABLET, DELAYED RELEASE ORAL DAILY
Status: DISCONTINUED | OUTPATIENT
Start: 2024-03-21 | End: 2024-03-24

## 2024-03-20 RX ORDER — HEPARIN SODIUM 1000 [USP'U]/ML
INJECTION, SOLUTION INTRAVENOUS; SUBCUTANEOUS PRN
Status: DISCONTINUED | OUTPATIENT
Start: 2024-03-20 | End: 2024-03-20 | Stop reason: SDUPTHER

## 2024-03-20 RX ORDER — DOCUSATE SODIUM 100 MG/1
100 CAPSULE, LIQUID FILLED ORAL DAILY
Status: DISCONTINUED | OUTPATIENT
Start: 2024-03-20 | End: 2024-03-24 | Stop reason: HOSPADM

## 2024-03-20 RX ORDER — NITROGLYCERIN 20 MG/100ML
INJECTION INTRAVENOUS CONTINUOUS PRN
Status: DISCONTINUED | OUTPATIENT
Start: 2024-03-20 | End: 2024-03-20 | Stop reason: SDUPTHER

## 2024-03-20 RX ORDER — SODIUM CHLORIDE, SODIUM LACTATE, POTASSIUM CHLORIDE, CALCIUM CHLORIDE 600; 310; 30; 20 MG/100ML; MG/100ML; MG/100ML; MG/100ML
INJECTION, SOLUTION INTRAVENOUS CONTINUOUS PRN
Status: DISCONTINUED | OUTPATIENT
Start: 2024-03-20 | End: 2024-03-20 | Stop reason: SDUPTHER

## 2024-03-20 RX ORDER — DEXMEDETOMIDINE HYDROCHLORIDE 4 UG/ML
INJECTION, SOLUTION INTRAVENOUS CONTINUOUS PRN
Status: DISCONTINUED | OUTPATIENT
Start: 2024-03-20 | End: 2024-03-20 | Stop reason: SDUPTHER

## 2024-03-20 RX ORDER — NOREPINEPHRINE BITARTRATE 0.06 MG/ML
.01-3.3 INJECTION, SOLUTION INTRAVENOUS CONTINUOUS PRN
Status: DISCONTINUED | OUTPATIENT
Start: 2024-03-20 | End: 2024-03-23

## 2024-03-20 RX ORDER — GABAPENTIN 100 MG/1
100 CAPSULE ORAL 3 TIMES DAILY
Status: DISCONTINUED | OUTPATIENT
Start: 2024-03-20 | End: 2024-03-24

## 2024-03-20 RX ORDER — MILRINONE LACTATE 0.2 MG/ML
.1-.75 INJECTION, SOLUTION INTRAVENOUS CONTINUOUS PRN
Status: DISCONTINUED | OUTPATIENT
Start: 2024-03-20 | End: 2024-03-20

## 2024-03-20 RX ORDER — VECURONIUM BROMIDE 1 MG/ML
INJECTION, POWDER, LYOPHILIZED, FOR SOLUTION INTRAVENOUS PRN
Status: DISCONTINUED | OUTPATIENT
Start: 2024-03-20 | End: 2024-03-20 | Stop reason: SDUPTHER

## 2024-03-20 RX ORDER — KETAMINE HCL IN NACL, ISO-OSM 100MG/10ML
SYRINGE (ML) INJECTION PRN
Status: DISCONTINUED | OUTPATIENT
Start: 2024-03-20 | End: 2024-03-20 | Stop reason: SDUPTHER

## 2024-03-20 RX ORDER — PROPOFOL 10 MG/ML
INJECTION, EMULSION INTRAVENOUS PRN
Status: DISCONTINUED | OUTPATIENT
Start: 2024-03-20 | End: 2024-03-20 | Stop reason: SDUPTHER

## 2024-03-20 RX ORDER — LIDOCAINE HYDROCHLORIDE 20 MG/ML
INJECTION, SOLUTION EPIDURAL; INFILTRATION; INTRACAUDAL; PERINEURAL PRN
Status: DISCONTINUED | OUTPATIENT
Start: 2024-03-20 | End: 2024-03-20 | Stop reason: SDUPTHER

## 2024-03-20 RX ORDER — SODIUM CHLORIDE 9 MG/ML
INJECTION, SOLUTION INTRAVENOUS PRN
Status: DISCONTINUED | OUTPATIENT
Start: 2024-03-20 | End: 2024-03-24 | Stop reason: HOSPADM

## 2024-03-20 RX ORDER — LIDOCAINE 4 G/G
1 PATCH TOPICAL EVERY 12 HOURS
Status: DISCONTINUED | OUTPATIENT
Start: 2024-03-20 | End: 2024-03-24

## 2024-03-20 RX ORDER — KETOROLAC TROMETHAMINE 30 MG/ML
30 INJECTION, SOLUTION INTRAMUSCULAR; INTRAVENOUS EVERY 6 HOURS
Status: DISCONTINUED | OUTPATIENT
Start: 2024-03-20 | End: 2024-03-22

## 2024-03-20 RX ORDER — ONDANSETRON 4 MG/1
4 TABLET, ORALLY DISINTEGRATING ORAL EVERY 8 HOURS PRN
Status: DISCONTINUED | OUTPATIENT
Start: 2024-03-20 | End: 2024-03-24 | Stop reason: HOSPADM

## 2024-03-20 RX ORDER — FENTANYL CITRATE 50 UG/ML
INJECTION, SOLUTION INTRAMUSCULAR; INTRAVENOUS PRN
Status: DISCONTINUED | OUTPATIENT
Start: 2024-03-20 | End: 2024-03-20 | Stop reason: SDUPTHER

## 2024-03-20 RX ORDER — SODIUM CHLORIDE 9 MG/ML
INJECTION, SOLUTION INTRAVENOUS
Status: DISPENSED
Start: 2024-03-20 | End: 2024-03-21

## 2024-03-20 RX ORDER — ONDANSETRON 2 MG/ML
4 INJECTION INTRAMUSCULAR; INTRAVENOUS EVERY 6 HOURS PRN
Status: DISCONTINUED | OUTPATIENT
Start: 2024-03-20 | End: 2024-03-24 | Stop reason: HOSPADM

## 2024-03-20 RX ORDER — MIDAZOLAM HYDROCHLORIDE 1 MG/ML
INJECTION INTRAMUSCULAR; INTRAVENOUS PRN
Status: DISCONTINUED | OUTPATIENT
Start: 2024-03-20 | End: 2024-03-20 | Stop reason: SDUPTHER

## 2024-03-20 RX ORDER — MAGNESIUM HYDROXIDE 1200 MG/15ML
LIQUID ORAL CONTINUOUS PRN
Status: COMPLETED | OUTPATIENT
Start: 2024-03-20 | End: 2024-03-20

## 2024-03-20 RX ORDER — DOBUTAMINE HYDROCHLORIDE 200 MG/100ML
2-10 INJECTION INTRAVENOUS CONTINUOUS PRN
Status: DISCONTINUED | OUTPATIENT
Start: 2024-03-20 | End: 2024-03-20

## 2024-03-20 RX ORDER — POTASSIUM CHLORIDE 20 MEQ/1
40 TABLET, EXTENDED RELEASE ORAL PRN
Status: DISCONTINUED | OUTPATIENT
Start: 2024-03-21 | End: 2024-03-24 | Stop reason: HOSPADM

## 2024-03-20 RX ORDER — ACETAMINOPHEN 500 MG
1000 TABLET ORAL EVERY 8 HOURS SCHEDULED
Status: DISCONTINUED | OUTPATIENT
Start: 2024-03-20 | End: 2024-03-24 | Stop reason: HOSPADM

## 2024-03-20 RX ORDER — HYDROMORPHONE HYDROCHLORIDE 1 MG/ML
0.5 INJECTION, SOLUTION INTRAMUSCULAR; INTRAVENOUS; SUBCUTANEOUS
Status: DISCONTINUED | OUTPATIENT
Start: 2024-03-20 | End: 2024-03-21

## 2024-03-20 RX ORDER — PHENYLEPHRINE HCL IN 0.9% NACL 1 MG/10 ML
SYRINGE (ML) INTRAVENOUS PRN
Status: DISCONTINUED | OUTPATIENT
Start: 2024-03-20 | End: 2024-03-20 | Stop reason: SDUPTHER

## 2024-03-20 RX ORDER — SODIUM CHLORIDE 0.9 % (FLUSH) 0.9 %
5-40 SYRINGE (ML) INJECTION PRN
Status: DISCONTINUED | OUTPATIENT
Start: 2024-03-20 | End: 2024-03-20 | Stop reason: HOSPADM

## 2024-03-20 RX ORDER — DEXTROSE MONOHYDRATE 100 MG/ML
INJECTION, SOLUTION INTRAVENOUS CONTINUOUS PRN
Status: DISCONTINUED | OUTPATIENT
Start: 2024-03-20 | End: 2024-03-24 | Stop reason: HOSPADM

## 2024-03-20 RX ORDER — NITROGLYCERIN 20 MG/100ML
5-300 INJECTION INTRAVENOUS CONTINUOUS
Status: DISCONTINUED | OUTPATIENT
Start: 2024-03-20 | End: 2024-03-21

## 2024-03-20 RX ORDER — ALBUMIN, HUMAN INJ 5% 5 %
25 SOLUTION INTRAVENOUS PRN
Status: DISCONTINUED | OUTPATIENT
Start: 2024-03-20 | End: 2024-03-22

## 2024-03-20 RX ORDER — ONDANSETRON 2 MG/ML
INJECTION INTRAMUSCULAR; INTRAVENOUS PRN
Status: DISCONTINUED | OUTPATIENT
Start: 2024-03-20 | End: 2024-03-20 | Stop reason: SDUPTHER

## 2024-03-20 RX ORDER — DIPHENHYDRAMINE HCL 25 MG
12.5 TABLET ORAL NIGHTLY PRN
Status: DISCONTINUED | OUTPATIENT
Start: 2024-03-20 | End: 2024-03-24 | Stop reason: HOSPADM

## 2024-03-20 RX ORDER — SODIUM CHLORIDE, SODIUM LACTATE, POTASSIUM CHLORIDE, CALCIUM CHLORIDE 600; 310; 30; 20 MG/100ML; MG/100ML; MG/100ML; MG/100ML
INJECTION, SOLUTION INTRAVENOUS CONTINUOUS
Status: DISCONTINUED | OUTPATIENT
Start: 2024-03-20 | End: 2024-03-21 | Stop reason: ALTCHOICE

## 2024-03-20 RX ORDER — SODIUM CHLORIDE 9 MG/ML
INJECTION, SOLUTION INTRAVENOUS
Status: COMPLETED
Start: 2024-03-20 | End: 2024-03-20

## 2024-03-20 RX ORDER — ATORVASTATIN CALCIUM 40 MG/1
40 TABLET, FILM COATED ORAL NIGHTLY
Status: DISCONTINUED | OUTPATIENT
Start: 2024-03-21 | End: 2024-03-24 | Stop reason: HOSPADM

## 2024-03-20 RX ORDER — SODIUM CHLORIDE 9 MG/ML
INJECTION, SOLUTION INTRAVENOUS PRN
Status: DISCONTINUED | OUTPATIENT
Start: 2024-03-20 | End: 2024-03-20 | Stop reason: HOSPADM

## 2024-03-20 RX ORDER — POTASSIUM CHLORIDE 29.8 MG/ML
20 INJECTION INTRAVENOUS PRN
Status: DISCONTINUED | OUTPATIENT
Start: 2024-03-20 | End: 2024-03-21

## 2024-03-20 RX ORDER — SODIUM CHLORIDE 0.9 % (FLUSH) 0.9 %
5-40 SYRINGE (ML) INJECTION EVERY 12 HOURS SCHEDULED
Status: DISCONTINUED | OUTPATIENT
Start: 2024-03-20 | End: 2024-03-24 | Stop reason: HOSPADM

## 2024-03-20 RX ORDER — SENNA AND DOCUSATE SODIUM 50; 8.6 MG/1; MG/1
1 TABLET, FILM COATED ORAL 2 TIMES DAILY
Status: DISCONTINUED | OUTPATIENT
Start: 2024-03-21 | End: 2024-03-24 | Stop reason: HOSPADM

## 2024-03-20 RX ORDER — CALCIUM CHLORIDE 100 MG/ML
INJECTION INTRAVENOUS; INTRAVENTRICULAR
Status: COMPLETED | OUTPATIENT
Start: 2024-03-20 | End: 2024-03-20

## 2024-03-20 RX ORDER — GLUCAGON 1 MG/ML
1 KIT INJECTION PRN
Status: DISCONTINUED | OUTPATIENT
Start: 2024-03-20 | End: 2024-03-24 | Stop reason: HOSPADM

## 2024-03-20 RX ORDER — PROTAMINE SULFATE 10 MG/ML
INJECTION, SOLUTION INTRAVENOUS PRN
Status: DISCONTINUED | OUTPATIENT
Start: 2024-03-20 | End: 2024-03-20 | Stop reason: SDUPTHER

## 2024-03-20 RX ORDER — DEXMEDETOMIDINE HYDROCHLORIDE 4 UG/ML
.1-1.5 INJECTION, SOLUTION INTRAVENOUS CONTINUOUS PRN
Status: DISCONTINUED | OUTPATIENT
Start: 2024-03-20 | End: 2024-03-21

## 2024-03-20 RX ORDER — PROPOFOL 10 MG/ML
INJECTION, EMULSION INTRAVENOUS
Status: DISPENSED
Start: 2024-03-20 | End: 2024-03-21

## 2024-03-20 RX ORDER — DEXAMETHASONE SODIUM PHOSPHATE 4 MG/ML
INJECTION, SOLUTION INTRA-ARTICULAR; INTRALESIONAL; INTRAMUSCULAR; INTRAVENOUS; SOFT TISSUE PRN
Status: DISCONTINUED | OUTPATIENT
Start: 2024-03-20 | End: 2024-03-20 | Stop reason: SDUPTHER

## 2024-03-20 RX ORDER — MAGNESIUM SULFATE IN WATER 40 MG/ML
2000 INJECTION, SOLUTION INTRAVENOUS PRN
Status: DISCONTINUED | OUTPATIENT
Start: 2024-03-20 | End: 2024-03-24 | Stop reason: HOSPADM

## 2024-03-20 RX ORDER — HYDROMORPHONE HYDROCHLORIDE 1 MG/ML
0.25 INJECTION, SOLUTION INTRAMUSCULAR; INTRAVENOUS; SUBCUTANEOUS
Status: DISCONTINUED | OUTPATIENT
Start: 2024-03-20 | End: 2024-03-21

## 2024-03-20 RX ORDER — SODIUM CHLORIDE 0.9 % (FLUSH) 0.9 %
5-40 SYRINGE (ML) INJECTION EVERY 12 HOURS SCHEDULED
Status: DISCONTINUED | OUTPATIENT
Start: 2024-03-20 | End: 2024-03-20 | Stop reason: HOSPADM

## 2024-03-20 RX ORDER — OXYCODONE HYDROCHLORIDE 5 MG/1
10 TABLET ORAL EVERY 4 HOURS PRN
Status: DISCONTINUED | OUTPATIENT
Start: 2024-03-20 | End: 2024-03-23

## 2024-03-20 RX ORDER — ALBUMIN, HUMAN INJ 5% 5 %
SOLUTION INTRAVENOUS PRN
Status: DISCONTINUED | OUTPATIENT
Start: 2024-03-20 | End: 2024-03-20 | Stop reason: SDUPTHER

## 2024-03-20 RX ORDER — BISACODYL 10 MG
10 SUPPOSITORY, RECTAL RECTAL EVERY 6 HOURS PRN
Status: DISCONTINUED | OUTPATIENT
Start: 2024-03-20 | End: 2024-03-20

## 2024-03-20 RX ORDER — VANCOMYCIN HYDROCHLORIDE 1 G/20ML
INJECTION, POWDER, LYOPHILIZED, FOR SOLUTION INTRAVENOUS
Status: COMPLETED | OUTPATIENT
Start: 2024-03-20 | End: 2024-03-20

## 2024-03-20 RX ORDER — HEPARIN SODIUM 5000 [USP'U]/ML
5000 INJECTION, SOLUTION INTRAVENOUS; SUBCUTANEOUS EVERY 8 HOURS SCHEDULED
Status: DISCONTINUED | OUTPATIENT
Start: 2024-03-21 | End: 2024-03-24 | Stop reason: HOSPADM

## 2024-03-20 RX ORDER — POLYETHYLENE GLYCOL 3350 17 G/17G
17 POWDER, FOR SOLUTION ORAL DAILY
Status: DISCONTINUED | OUTPATIENT
Start: 2024-03-21 | End: 2024-03-24 | Stop reason: HOSPADM

## 2024-03-20 RX ORDER — TAMSULOSIN HYDROCHLORIDE 0.4 MG/1
0.4 CAPSULE ORAL DAILY
Status: DISCONTINUED | OUTPATIENT
Start: 2024-03-21 | End: 2024-03-23

## 2024-03-20 RX ORDER — SODIUM CHLORIDE 0.9 % (FLUSH) 0.9 %
5-40 SYRINGE (ML) INJECTION PRN
Status: DISCONTINUED | OUTPATIENT
Start: 2024-03-20 | End: 2024-03-24 | Stop reason: HOSPADM

## 2024-03-20 RX ADMIN — ACETAMINOPHEN 1000 MG: 500 TABLET ORAL at 16:19

## 2024-03-20 RX ADMIN — PROPOFOL 75 MCG/KG/MIN: 10 INJECTION, EMULSION INTRAVENOUS at 10:03

## 2024-03-20 RX ADMIN — Medication 10 MG: at 07:31

## 2024-03-20 RX ADMIN — MUPIROCIN: 20 OINTMENT TOPICAL at 19:48

## 2024-03-20 RX ADMIN — FENTANYL CITRATE 100 MCG: 50 INJECTION, SOLUTION INTRAMUSCULAR; INTRAVENOUS at 08:35

## 2024-03-20 RX ADMIN — MUPIROCIN: 20 OINTMENT TOPICAL at 16:19

## 2024-03-20 RX ADMIN — OXYCODONE 5 MG: 5 TABLET ORAL at 18:15

## 2024-03-20 RX ADMIN — FENTANYL CITRATE 50 MCG: 50 INJECTION, SOLUTION INTRAMUSCULAR; INTRAVENOUS at 07:41

## 2024-03-20 RX ADMIN — SODIUM CHLORIDE, POTASSIUM CHLORIDE, SODIUM LACTATE AND CALCIUM CHLORIDE: 600; 310; 30; 20 INJECTION, SOLUTION INTRAVENOUS at 13:46

## 2024-03-20 RX ADMIN — SODIUM CHLORIDE, POTASSIUM CHLORIDE, SODIUM LACTATE AND CALCIUM CHLORIDE: 600; 310; 30; 20 INJECTION, SOLUTION INTRAVENOUS at 07:28

## 2024-03-20 RX ADMIN — SODIUM CHLORIDE 2 UNITS/HR: 9 INJECTION, SOLUTION INTRAVENOUS at 09:21

## 2024-03-20 RX ADMIN — PROPOFOL 25 MG: 10 INJECTION, EMULSION INTRAVENOUS at 08:33

## 2024-03-20 RX ADMIN — SODIUM CHLORIDE: 9 INJECTION, SOLUTION INTRAVENOUS at 07:28

## 2024-03-20 RX ADMIN — PROTAMINE SULFATE 200 MG: 10 INJECTION, SOLUTION INTRAVENOUS at 11:22

## 2024-03-20 RX ADMIN — DOCUSATE SODIUM 100 MG: 100 CAPSULE, LIQUID FILLED ORAL at 19:54

## 2024-03-20 RX ADMIN — DEXMEDETOMIDINE HYDROCHLORIDE 0.7 MCG/KG/HR: 400 INJECTION, SOLUTION INTRAVENOUS at 08:29

## 2024-03-20 RX ADMIN — ALBUMIN (HUMAN) 12.5 G: 12.5 INJECTION, SOLUTION INTRAVENOUS at 22:14

## 2024-03-20 RX ADMIN — GABAPENTIN 100 MG: 100 CAPSULE ORAL at 19:52

## 2024-03-20 RX ADMIN — MIDAZOLAM 2 MG: 1 INJECTION INTRAMUSCULAR; INTRAVENOUS at 07:31

## 2024-03-20 RX ADMIN — VECURONIUM BROMIDE 10 MG: 1 INJECTION, POWDER, LYOPHILIZED, FOR SOLUTION INTRAVENOUS at 09:26

## 2024-03-20 RX ADMIN — FENTANYL CITRATE 150 MCG: 50 INJECTION, SOLUTION INTRAMUSCULAR; INTRAVENOUS at 08:37

## 2024-03-20 RX ADMIN — Medication 200 MCG: at 11:21

## 2024-03-20 RX ADMIN — AMINOCAPROIC ACID 5000 MG/HR: 250 INJECTION, SOLUTION INTRAVENOUS at 08:12

## 2024-03-20 RX ADMIN — NITROGLYCERIN 5 MCG/MIN: 20 INJECTION INTRAVENOUS at 16:08

## 2024-03-20 RX ADMIN — ALBUMIN (HUMAN) 250 ML: 12.5 INJECTION, SOLUTION INTRAVENOUS at 12:21

## 2024-03-20 RX ADMIN — Medication 100 MCG: at 08:57

## 2024-03-20 RX ADMIN — VECURONIUM BROMIDE 5 MG: 1 INJECTION, POWDER, LYOPHILIZED, FOR SOLUTION INTRAVENOUS at 10:20

## 2024-03-20 RX ADMIN — ALBUMIN (HUMAN) 25 G: 12.5 INJECTION, SOLUTION INTRAVENOUS at 17:29

## 2024-03-20 RX ADMIN — CEFAZOLIN 2000 MG: 2 INJECTION, POWDER, FOR SOLUTION INTRAMUSCULAR; INTRAVENOUS at 12:06

## 2024-03-20 RX ADMIN — NITROGLYCERIN 10 MCG/MIN: 20 INJECTION INTRAVENOUS at 08:45

## 2024-03-20 RX ADMIN — Medication 30 MG: at 10:05

## 2024-03-20 RX ADMIN — SODIUM CHLORIDE, PRESERVATIVE FREE 10 ML: 5 INJECTION INTRAVENOUS at 19:40

## 2024-03-20 RX ADMIN — PROTAMINE SULFATE 50 MG: 10 INJECTION, SOLUTION INTRAVENOUS at 11:24

## 2024-03-20 RX ADMIN — ACETAMINOPHEN 1000 MG: 500 TABLET ORAL at 21:28

## 2024-03-20 RX ADMIN — SODIUM CHLORIDE 500 ML: 9 INJECTION, SOLUTION INTRAVENOUS at 21:28

## 2024-03-20 RX ADMIN — HEPARIN SODIUM 40000 UNITS: 1000 INJECTION INTRAVENOUS; SUBCUTANEOUS at 09:12

## 2024-03-20 RX ADMIN — FENTANYL CITRATE 50 MCG: 50 INJECTION, SOLUTION INTRAMUSCULAR; INTRAVENOUS at 07:31

## 2024-03-20 RX ADMIN — ALBUMIN (HUMAN) 25 G: 12.5 INJECTION, SOLUTION INTRAVENOUS at 14:00

## 2024-03-20 RX ADMIN — FENTANYL CITRATE 50 MCG: 50 INJECTION, SOLUTION INTRAMUSCULAR; INTRAVENOUS at 07:46

## 2024-03-20 RX ADMIN — ONDANSETRON 4 MG: 2 INJECTION INTRAMUSCULAR; INTRAVENOUS at 12:26

## 2024-03-20 RX ADMIN — FENTANYL CITRATE 100 MCG: 50 INJECTION, SOLUTION INTRAMUSCULAR; INTRAVENOUS at 08:30

## 2024-03-20 RX ADMIN — PROPOFOL 25 MG: 10 INJECTION, EMULSION INTRAVENOUS at 07:45

## 2024-03-20 RX ADMIN — POTASSIUM CHLORIDE 20 MEQ: 29.8 INJECTION, SOLUTION INTRAVENOUS at 19:47

## 2024-03-20 RX ADMIN — CEFAZOLIN 2000 MG: 2 INJECTION, POWDER, FOR SOLUTION INTRAMUSCULAR; INTRAVENOUS at 21:21

## 2024-03-20 RX ADMIN — KETOROLAC TROMETHAMINE 30 MG: 30 INJECTION, SOLUTION INTRAMUSCULAR at 19:39

## 2024-03-20 RX ADMIN — PROTAMINE SULFATE 100 MG: 10 INJECTION, SOLUTION INTRAVENOUS at 11:31

## 2024-03-20 RX ADMIN — Medication 100 MCG: at 12:09

## 2024-03-20 RX ADMIN — LIDOCAINE HYDROCHLORIDE 5 ML: 20 INJECTION, SOLUTION EPIDURAL; INFILTRATION; INTRACAUDAL; PERINEURAL at 07:41

## 2024-03-20 RX ADMIN — DEXAMETHASONE SODIUM PHOSPHATE 4 MG: 4 INJECTION, SOLUTION INTRAMUSCULAR; INTRAVENOUS at 12:26

## 2024-03-20 RX ADMIN — POTASSIUM CHLORIDE 20 MEQ: 29.8 INJECTION, SOLUTION INTRAVENOUS at 14:51

## 2024-03-20 RX ADMIN — CEFAZOLIN 2000 MG: 2 INJECTION, POWDER, FOR SOLUTION INTRAMUSCULAR; INTRAVENOUS at 08:07

## 2024-03-20 RX ADMIN — Medication 10 MG: at 07:39

## 2024-03-20 RX ADMIN — PROTAMINE SULFATE 50 MG: 10 INJECTION, SOLUTION INTRAVENOUS at 11:20

## 2024-03-20 RX ADMIN — Medication 100 MCG: at 09:04

## 2024-03-20 RX ADMIN — PROPOFOL 150 MG: 10 INJECTION, EMULSION INTRAVENOUS at 07:41

## 2024-03-20 RX ADMIN — Medication 50 MG/KG/HR: at 12:35

## 2024-03-20 RX ADMIN — VECURONIUM BROMIDE 20 MG: 1 INJECTION, POWDER, LYOPHILIZED, FOR SOLUTION INTRAVENOUS at 07:41

## 2024-03-20 RX ADMIN — METOPROLOL TARTRATE 12.5 MG: 25 TABLET, FILM COATED ORAL at 06:04

## 2024-03-20 RX ADMIN — NOREPINEPHRINE BITARTRATE 2 MCG/MIN: 1 INJECTION, SOLUTION, CONCENTRATE INTRAVENOUS at 11:11

## 2024-03-20 RX ADMIN — Medication 100 MCG: at 09:44

## 2024-03-20 RX ADMIN — KETOROLAC TROMETHAMINE 15 MG: 15 INJECTION, SOLUTION INTRAMUSCULAR; INTRAVENOUS at 14:43

## 2024-03-20 RX ADMIN — SODIUM CHLORIDE, POTASSIUM CHLORIDE, SODIUM LACTATE AND CALCIUM CHLORIDE: 600; 310; 30; 20 INJECTION, SOLUTION INTRAVENOUS at 11:34

## 2024-03-20 RX ADMIN — POTASSIUM CHLORIDE 20 MEQ: 29.8 INJECTION, SOLUTION INTRAVENOUS at 13:47

## 2024-03-20 RX ADMIN — POTASSIUM CHLORIDE 20 MEQ: 29.8 INJECTION, SOLUTION INTRAVENOUS at 17:55

## 2024-03-20 RX ADMIN — SODIUM BICARBONATE 25 MEQ: 84 INJECTION INTRAVENOUS at 11:37

## 2024-03-20 RX ADMIN — INSULIN HUMAN 2 UNITS: 100 INJECTION, SOLUTION PARENTERAL at 10:12

## 2024-03-20 RX ADMIN — SODIUM BICARBONATE 25 MEQ: 84 INJECTION INTRAVENOUS at 11:33

## 2024-03-20 ASSESSMENT — PAIN SCALES - GENERAL
PAINLEVEL_OUTOF10: 0
PAINLEVEL_OUTOF10: 3
PAINLEVEL_OUTOF10: 0
PAINLEVEL_OUTOF10: 0
PAINLEVEL_OUTOF10: 3

## 2024-03-20 ASSESSMENT — PAIN DESCRIPTION - LOCATION
LOCATION: CHEST
LOCATION: CHEST

## 2024-03-20 ASSESSMENT — PAIN DESCRIPTION - DESCRIPTORS
DESCRIPTORS: ACHING
DESCRIPTORS: ACHING

## 2024-03-20 ASSESSMENT — PULMONARY FUNCTION TESTS: PIF_VALUE: 24

## 2024-03-20 ASSESSMENT — PAIN DESCRIPTION - FREQUENCY: FREQUENCY: CONTINUOUS

## 2024-03-20 ASSESSMENT — PAIN DESCRIPTION - PAIN TYPE: TYPE: SURGICAL PAIN

## 2024-03-20 ASSESSMENT — PAIN DESCRIPTION - ORIENTATION
ORIENTATION: MID
ORIENTATION: MID

## 2024-03-20 NOTE — ANESTHESIA PROCEDURE NOTES
Procedure Performed: ROSA       Start Time:        End Time:      Preanesthesia Checklist:  Patient identified, IV assessed, risks and benefits discussed, monitors and equipment assessed, procedure being performed at surgeon's request and anesthesia consent obtained.    General Procedure Information  Diagnostic Indications for Echo:  assessment of surgical repair and assessment of valve function  Physician Requesting Echo: Debbie Mccoy MD  Location performed:  OR  Intubated  Bite block placed  Heart visualized  Probe Insertion:  Easy  Probe Type:  Mulitplane  Modalities:  2D, color flow mapping, continuous wave Doppler and pulse wave Doppler    Echocardiographic and Doppler Measurements    Ventricles    Right Ventricle:  Cavity size normal.  Hypertrophy not present.  Thrombus not present.  Global function normal.    Left Ventricle:  Cavity size normal.  Hypertrophy not present.  Thrombus not present.  Global Function normal.  Ejection Fraction 60%.      Ventricular Regional Function:  1- Basal Anteroseptal:  normal  2- Basal Anterior:  normal  3- Basal Anterolateral:  normal  4- Basal Inferolateral:  normal  5- Basal Inferior:  normal  6- Basal Inferoseptal:  normal  7- Mid Anteroseptal:  normal  8- Mid Anterior:  normal  9- Mid Anterolateral:  normal  10- Mid Inferolateral:  normal  11- Mid Inferior:  normal  12- Mid Inferoseptal:  normal  13- Apical Anterior:  normal  14- Apical Lateral:  normal  15- Apical Inferior:  normal  16- Apical Septal:  normal  17- [place]:  normal      Valves    Aortic Valve:  Annulus normal.  Stenosis not present.  Regurgitation none.  Leaflets normal.  Leaflet motions normal.      Mitral Valve:  Annulus normal.  Stenosis not present.  Regurgitation none.  Leaflets normal.  Leaflet motions normal.      Tricuspid Valve:  Annulus normal.  Stenosis not present.  Regurgitation none.  Leaflets normal.  Leaflet motions normal.        Aorta    Ascending Aorta:  Size normal.    Aortic Arch:  Size

## 2024-03-20 NOTE — OP NOTE
Operative Note      Patient: Arash Valdes  YOB: 1965  MRN: [id number]    Date of Procedure: 3/20/2024    Pre-Op Diagnosis Codes:     * Coronary artery disease [I25.10]    Post-Op Diagnosis: Same       Procedure(s):  CABG X 3  with LIMA and SVG, TCPB, ROSA, VERIFICATION OF BYPASS GRAFTS FLOW WITH MEDISTEM, EVH OF _RIGHT SAPHENOUS VEIN, APPLICATION OF PLATELET GEL, STERNAL PLATING  LIMA to distal LAD  SVG to RI  SVG to PDA  EVH    Surgeon(s):  Debbie Mccoy MD    Assistant:   Surgical Assistant: Rosendo Gooden RN  First Assistant: Stanley Cardoso; Blane Bonner RN    Anesthesia: General    Estimated Blood Loss (mL): 500    Complications: None    Specimens:   * No specimens in log *    Implants:  Implant Name Type Inv. Item Serial No.  Lot No. LRB No. Used Action   CABLE STRNL TAPR 3 BLNT 1 CUT EDGE NDL SS - [id number]  CABLE STRNL TAPR 3 BLNT 1 CUT EDGE NDL SS  [employer]WD  N/A 1 Implanted   CLIP INT SM WIDE WECK RED TI TRNSVRS GRV CHEVRON SHP W/ PERCIS TIP 24 PER PK - [id number]  CLIP INT SM WIDE WECK RED TI TRNSVRS GRV CHEVRON SHP W/ PERCIS TIP 24 PER PK  [employer] [id number] N/A 2 Implanted   PLATE BNE 8 H JL SHP STERNALOCK CHANEL DAVID CLSR SYS - [id number]  PLATE BNE 8 H JL SHP STERNALOCK CHANEL DAVID CLSR SYS  DELIO BIOMET MICROFIXATION-WD  N/A 1 Implanted   PLATE BNE 8 H X SHP STERNALOCK CHANEL DAVID CLSR SYS - [id number]  PLATE BNE 8 H X SHP STERNALOCK CHANEL DAVID CLSR SYS  DELIO BIOMET MICROFIXATION-WD  N/A 1 Implanted   PLATE BNE 4 H BX SHP STERNALOCK CHANEL DAVID CLSR SYS - [id number]  PLATE BNE 4 H BX SHP STERNALOCK CHANEL DAVID CLSR SYS  DELIO BIOMET MICROFIXATION-WD  N/A 2 Implanted   SCREW BNE L18MM DIA2.4MM G CANC SELF DRL CLARA FULL THRD - [id number]  SCREW BNE L18MM DIA2.4MM G CANC SELF DRL CLARA FULL THRD  [facility] MICROFIXATION-WD  N/A 8 Implanted   SCREW BNE L14MM DIA2.4MM G CANC TI SELF DRL CLARA FULL THRD - [id number]  SCREW BNE L14MM DIA2.4MM G CANC TI SELF DRL CLARA FULL

## 2024-03-20 NOTE — ANESTHESIA PROCEDURE NOTES
Central Venous Line:    A central venous line was placed using ultrasound guidance and surface landmarks, in the OR for the following indication(s): central venous access and CVP monitoring.3/20/2024 7:54 AM3/20/2024 7:59 AM    Sterility preparation included the following: provider used sterile gloves, gown, hat and mask, hand hygiene performed prior to central venous catheter insertion, sterile gel and probe cover used in ultrasound-guided central venous catheter insertion and maximum sterile barriers used during central venous catheter insertion.    The patient was placed in Trendelenburg position.The right internal jugular vein was prepped.    The site was prepped with Chloraprep.  A 8.5 Fr (size), 15 (length), introducer triple lumen was placed.    During the procedure, the following specific steps were taken: target vein identified, needle advanced into vein and blood aspirated and guidewire advanced into vein.    Intravenous verification was obtained by ultrasound and venous blood return.    Post insertion care included: all ports aspirated, all ports flushed easily, guidewire removed intact, Biopatch applied, line sutured in place and dressing applied.    During the procedure the patient experienced: patient tolerated procedure well with no complications and EBL 0mL.      Outcomes: uncomplicated and patient tolerated procedure well  Anesthesia type: general..No  Staffing  Performed: Anesthesiologist   Anesthesiologist: ANAM Platt MD  Performed by: ANAM Platt MD  Authorized by: ANAM Platt MD    Preanesthetic Checklist  Completed: patient identified, IV checked, site marked, risks and benefits discussed, surgical/procedural consents, equipment checked, pre-op evaluation, timeout performed, anesthesia consent given, oxygen available, monitors applied/VS acknowledged, fire risk safety assessment completed and verbalized and blood product R/B/A discussed and consented

## 2024-03-20 NOTE — PROGRESS NOTES
Patient admitted to CVU from CVOR and attached to ventilator and monitors.  Report received from anesthesiologist.  Chest x-ray ordered.  Labs drawn and sent.  Assessment complete.  Continue monitoring hemodynamics.  Family let back to see patient.  Visiting hours reviewed and all questions answered. Family aware of discharge class. Primary RN Sonia.    RECOVERY PERIOD BEGINS  [id number]

## 2024-03-20 NOTE — PROGRESS NOTES
Pt verified information regarding surgery, name, birth date, surgeon, procedure and allergies. Patient admitted to [facility] for surgery. Appropriate antibiotics ordered: Ancef . Beta blocker: Metoprolol @ 0604. Lab work within normal limits, 3 units of RBC's on call to OR, vital signs stable, Mepilex sacral border applied and 2% chlorhexidine gluconate skin prep given. Patient and wife educated about surgery and pain management. To surgery per bed at 0727.

## 2024-03-20 NOTE — H&P
I have reviewed the H&P and  I have examined the patient and I find no changes.  I once again reviewed the operative procedure with the patient .  I also once again reviewed the risks, benefits and alternatives, including the alternative of not doing the intended procedure, with the patient and any family present.  There are no active changes and the patient wishes to proceed.       The patient is a 58 y.o. male with cardiac risk factors of hypertension, hyperlipidemia, diabetes mellitus and strong family history of premature coronary disease who was found to have elevated calcium score.  Cardiac CT was ordered as part of screening total because of patient's history of premature coronary disease in first-degree relatives and his anxiety about this.  He denies any history of angina chest pain shortness of breath dyspnea on exertion symptoms of congestive heart failure palpitations TIA or CVA.  He does have diabetes but this has been very well-controlled according to the patient.  He is compliant with his medications.  Past Medical History:             Diagnosis Date    HTN (hypertension)      Lumbar disc herniation-back surgery twice 2004, 2007    Hyperlipidemia       Hypertension      Type II or unspecified type diabetes mellitus without mention of complication, not stated as uncontrolled 11/13     Headaches-chronic       Pituitary adenoma-being followed by Dr. 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  No imaging found to support       Hypogonadism       Vitamin D deficiency       Umbilical hernia                Past Surgical History:    Past Surgical History             Procedure Laterality Date    LUMBAR DISC SURGERY   08/2004, 02/2007     L4-L5 (twice), [place], hardware in place         Current Medications:   Aspirin 81 mg

## 2024-03-20 NOTE — PROGRESS NOTES
Patient awake and following commands.  Patient placed on CPAP per open heart protocol.  ABG drawn 30 minutes later and WDL.  Respiratory called for weaning parameters.  NIF - 40.  Patient suctioned and extubated to 4 liters nasal cannula  OG tube discontinued.   Patient tolerated well.  Oxygen saturation 98.   Patient alert and oriented X 3.

## 2024-03-21 ENCOUNTER — APPOINTMENT (OUTPATIENT)
Dept: GENERAL RADIOLOGY | Age: 59
DRG: 236 | End: 2024-03-21
Attending: THORACIC SURGERY (CARDIOTHORACIC VASCULAR SURGERY)
Payer: COMMERCIAL

## 2024-03-21 LAB
ANION GAP SERPL CALCULATED.3IONS-SCNC: 13 MMOL/L (ref 3–16)
BUN SERPL-MCNC: 13 MG/DL (ref 7–20)
CA-I BLD-SCNC: 1.18 MMOL/L (ref 1.12–1.32)
CALCIUM SERPL-MCNC: 8.1 MG/DL (ref 8.3–10.6)
CHLORIDE SERPL-SCNC: 116 MMOL/L (ref 99–110)
CO2 SERPL-SCNC: 18 MMOL/L (ref 21–32)
CREAT SERPL-MCNC: 0.8 MG/DL (ref 0.9–1.3)
DEPRECATED RDW RBC AUTO: 13.5 % (ref 12.4–15.4)
FIBRINOGEN PPP-MCNC: 191 MG/DL (ref 243–550)
GFR SERPLBLD CREATININE-BSD FMLA CKD-EPI: >60 ML/MIN/{1.73_M2}
GLUCOSE BLD-MCNC: 102 MG/DL (ref 70–99)
GLUCOSE BLD-MCNC: 106 MG/DL (ref 70–99)
GLUCOSE BLD-MCNC: 108 MG/DL (ref 70–99)
GLUCOSE BLD-MCNC: 110 MG/DL (ref 70–99)
GLUCOSE BLD-MCNC: 113 MG/DL (ref 70–99)
GLUCOSE BLD-MCNC: 115 MG/DL (ref 70–99)
GLUCOSE BLD-MCNC: 116 MG/DL (ref 70–99)
GLUCOSE BLD-MCNC: 120 MG/DL (ref 70–99)
GLUCOSE BLD-MCNC: 121 MG/DL (ref 70–99)
GLUCOSE BLD-MCNC: 121 MG/DL (ref 70–99)
GLUCOSE BLD-MCNC: 123 MG/DL (ref 70–99)
GLUCOSE BLD-MCNC: 123 MG/DL (ref 70–99)
GLUCOSE BLD-MCNC: 124 MG/DL (ref 70–99)
GLUCOSE BLD-MCNC: 125 MG/DL (ref 70–99)
GLUCOSE BLD-MCNC: 128 MG/DL (ref 70–99)
GLUCOSE BLD-MCNC: 134 MG/DL (ref 70–99)
GLUCOSE BLD-MCNC: 141 MG/DL (ref 70–99)
GLUCOSE BLD-MCNC: 142 MG/DL (ref 70–99)
GLUCOSE BLD-MCNC: 167 MG/DL (ref 70–99)
GLUCOSE BLD-MCNC: 191 MG/DL (ref 70–99)
GLUCOSE BLD-MCNC: 205 MG/DL (ref 70–99)
GLUCOSE BLD-MCNC: 219 MG/DL (ref 70–99)
GLUCOSE SERPL-MCNC: 112 MG/DL (ref 70–99)
HCT VFR BLD AUTO: 29 % (ref 40.5–52.5)
HCT VFR BLD AUTO: 31.3 % (ref 40.5–52.5)
HGB BLD CALC-MCNC: 9.9 GM/DL (ref 13.5–17.5)
HGB BLD-MCNC: 10.8 G/DL (ref 13.5–17.5)
MAGNESIUM SERPL-MCNC: 2.1 MG/DL (ref 1.8–2.4)
MCH RBC QN AUTO: 29.8 PG (ref 26–34)
MCHC RBC AUTO-ENTMCNC: 34.6 G/DL (ref 31–36)
MCV RBC AUTO: 86.1 FL (ref 80–100)
PERFORMED ON: ABNORMAL
PLATELET # BLD AUTO: 132 K/UL (ref 135–450)
PMV BLD AUTO: 8.6 FL (ref 5–10.5)
POC SAMPLE TYPE: ABNORMAL
POTASSIUM BLD-SCNC: 3.7 MMOL/L (ref 3.5–5.1)
POTASSIUM SERPL-SCNC: 3.7 MMOL/L (ref 3.5–5.1)
RBC # BLD AUTO: 3.64 M/UL (ref 4.2–5.9)
SODIUM SERPL-SCNC: 147 MMOL/L (ref 136–145)
WBC # BLD AUTO: 12.5 K/UL (ref 4–11)

## 2024-03-21 PROCEDURE — 6360000002 HC RX W HCPCS: Performed by: STUDENT IN AN ORGANIZED HEALTH CARE EDUCATION/TRAINING PROGRAM

## 2024-03-21 PROCEDURE — 94761 N-INVAS EAR/PLS OXIMETRY MLT: CPT

## 2024-03-21 PROCEDURE — 82330 ASSAY OF CALCIUM: CPT

## 2024-03-21 PROCEDURE — 85027 COMPLETE CBC AUTOMATED: CPT

## 2024-03-21 PROCEDURE — 6370000000 HC RX 637 (ALT 250 FOR IP): Performed by: PHYSICIAN ASSISTANT

## 2024-03-21 PROCEDURE — 6360000002 HC RX W HCPCS: Performed by: THORACIC SURGERY (CARDIOTHORACIC VASCULAR SURGERY)

## 2024-03-21 PROCEDURE — 85014 HEMATOCRIT: CPT

## 2024-03-21 PROCEDURE — 2100000000 HC CCU R&B

## 2024-03-21 PROCEDURE — 6370000000 HC RX 637 (ALT 250 FOR IP): Performed by: THORACIC SURGERY (CARDIOTHORACIC VASCULAR SURGERY)

## 2024-03-21 PROCEDURE — 85384 FIBRINOGEN ACTIVITY: CPT

## 2024-03-21 PROCEDURE — 2700000000 HC OXYGEN THERAPY PER DAY

## 2024-03-21 PROCEDURE — 83735 ASSAY OF MAGNESIUM: CPT

## 2024-03-21 PROCEDURE — 94669 MECHANICAL CHEST WALL OSCILL: CPT

## 2024-03-21 PROCEDURE — 93005 ELECTROCARDIOGRAM TRACING: CPT | Performed by: PHYSICIAN ASSISTANT

## 2024-03-21 PROCEDURE — 2580000003 HC RX 258

## 2024-03-21 PROCEDURE — 6360000002 HC RX W HCPCS: Performed by: PHYSICIAN ASSISTANT

## 2024-03-21 PROCEDURE — 84132 ASSAY OF SERUM POTASSIUM: CPT

## 2024-03-21 PROCEDURE — 82947 ASSAY GLUCOSE BLOOD QUANT: CPT

## 2024-03-21 PROCEDURE — 71045 X-RAY EXAM CHEST 1 VIEW: CPT

## 2024-03-21 PROCEDURE — 2580000003 HC RX 258: Performed by: PHYSICIAN ASSISTANT

## 2024-03-21 PROCEDURE — 2580000003 HC RX 258: Performed by: STUDENT IN AN ORGANIZED HEALTH CARE EDUCATION/TRAINING PROGRAM

## 2024-03-21 PROCEDURE — 2000000000 HC ICU R&B

## 2024-03-21 PROCEDURE — 80048 BASIC METABOLIC PNL TOTAL CA: CPT

## 2024-03-21 PROCEDURE — 99024 POSTOP FOLLOW-UP VISIT: CPT

## 2024-03-21 PROCEDURE — P9045 ALBUMIN (HUMAN), 5%, 250 ML: HCPCS | Performed by: PHYSICIAN ASSISTANT

## 2024-03-21 RX ORDER — SODIUM CHLORIDE 450 MG/100ML
INJECTION, SOLUTION INTRAVENOUS CONTINUOUS
Status: ACTIVE | OUTPATIENT
Start: 2024-03-21 | End: 2024-03-21

## 2024-03-21 RX ORDER — SODIUM CHLORIDE 450 MG/100ML
500 INJECTION, SOLUTION INTRAVENOUS ONCE
Status: COMPLETED | OUTPATIENT
Start: 2024-03-21 | End: 2024-03-21

## 2024-03-21 RX ORDER — HYDROMORPHONE HYDROCHLORIDE 1 MG/ML
0.25 INJECTION, SOLUTION INTRAMUSCULAR; INTRAVENOUS; SUBCUTANEOUS
Status: DISCONTINUED | OUTPATIENT
Start: 2024-03-21 | End: 2024-03-22

## 2024-03-21 RX ORDER — LANOLIN ALCOHOL/MO/W.PET/CERES
6 CREAM (GRAM) TOPICAL NIGHTLY PRN
Status: DISCONTINUED | OUTPATIENT
Start: 2024-03-21 | End: 2024-03-24 | Stop reason: HOSPADM

## 2024-03-21 RX ORDER — ASPIRIN 81 MG/1
81 TABLET ORAL DAILY
Status: DISCONTINUED | OUTPATIENT
Start: 2024-03-21 | End: 2024-03-24 | Stop reason: HOSPADM

## 2024-03-21 RX ORDER — SODIUM CHLORIDE 450 MG/100ML
INJECTION, SOLUTION INTRAVENOUS CONTINUOUS
Status: DISCONTINUED | OUTPATIENT
Start: 2024-03-21 | End: 2024-03-23 | Stop reason: ALTCHOICE

## 2024-03-21 RX ORDER — SODIUM CHLORIDE 9 MG/ML
INJECTION, SOLUTION INTRAVENOUS
Status: DISPENSED
Start: 2024-03-21 | End: 2024-03-22

## 2024-03-21 RX ADMIN — SODIUM CHLORIDE, PRESERVATIVE FREE 10 ML: 5 INJECTION INTRAVENOUS at 09:08

## 2024-03-21 RX ADMIN — ACETAMINOPHEN 1000 MG: 500 TABLET ORAL at 21:01

## 2024-03-21 RX ADMIN — PANTOPRAZOLE SODIUM 40 MG: 40 TABLET, DELAYED RELEASE ORAL at 08:13

## 2024-03-21 RX ADMIN — GABAPENTIN 100 MG: 100 CAPSULE ORAL at 08:12

## 2024-03-21 RX ADMIN — KETOROLAC TROMETHAMINE 30 MG: 30 INJECTION, SOLUTION INTRAMUSCULAR at 02:13

## 2024-03-21 RX ADMIN — OXYCODONE 10 MG: 5 TABLET ORAL at 14:59

## 2024-03-21 RX ADMIN — POTASSIUM CHLORIDE 20 MEQ: 29.8 INJECTION, SOLUTION INTRAVENOUS at 04:12

## 2024-03-21 RX ADMIN — VASOPRESSIN 0.02 UNITS/MIN: 20 INJECTION INTRAVENOUS at 20:37

## 2024-03-21 RX ADMIN — VASOPRESSIN 0.02 UNITS/MIN: 20 INJECTION INTRAVENOUS at 09:12

## 2024-03-21 RX ADMIN — DIPHENHYDRAMINE HYDROCHLORIDE 12.5 MG: 25 TABLET ORAL at 21:01

## 2024-03-21 RX ADMIN — ALBUMIN (HUMAN) 12.5 G: 12.5 INJECTION, SOLUTION INTRAVENOUS at 05:59

## 2024-03-21 RX ADMIN — ACETAMINOPHEN 1000 MG: 500 TABLET ORAL at 13:16

## 2024-03-21 RX ADMIN — GABAPENTIN 100 MG: 100 CAPSULE ORAL at 13:15

## 2024-03-21 RX ADMIN — ASPIRIN 81 MG: 81 TABLET, COATED ORAL at 08:11

## 2024-03-21 RX ADMIN — SODIUM CHLORIDE 9.17 UNITS/HR: 9 INJECTION, SOLUTION INTRAVENOUS at 15:21

## 2024-03-21 RX ADMIN — GABAPENTIN 100 MG: 100 CAPSULE ORAL at 21:01

## 2024-03-21 RX ADMIN — POTASSIUM CHLORIDE 20 MEQ: 29.8 INJECTION, SOLUTION INTRAVENOUS at 00:13

## 2024-03-21 RX ADMIN — SODIUM CHLORIDE: 4.5 INJECTION, SOLUTION INTRAVENOUS at 08:24

## 2024-03-21 RX ADMIN — POTASSIUM CHLORIDE 20 MEQ: 29.8 INJECTION, SOLUTION INTRAVENOUS at 05:22

## 2024-03-21 RX ADMIN — SODIUM CHLORIDE: 4.5 INJECTION, SOLUTION INTRAVENOUS at 13:58

## 2024-03-21 RX ADMIN — ACETAMINOPHEN 1000 MG: 500 TABLET ORAL at 05:58

## 2024-03-21 RX ADMIN — CEFAZOLIN 2000 MG: 2 INJECTION, POWDER, FOR SOLUTION INTRAMUSCULAR; INTRAVENOUS at 06:25

## 2024-03-21 RX ADMIN — TAMSULOSIN HYDROCHLORIDE 0.4 MG: 0.4 CAPSULE ORAL at 08:12

## 2024-03-21 RX ADMIN — HEPARIN SODIUM 5000 UNITS: 5000 INJECTION INTRAVENOUS; SUBCUTANEOUS at 08:12

## 2024-03-21 RX ADMIN — CEFAZOLIN 2000 MG: 2 INJECTION, POWDER, FOR SOLUTION INTRAMUSCULAR; INTRAVENOUS at 20:36

## 2024-03-21 RX ADMIN — DOCUSATE SODIUM 50MG AND SENNOSIDES 8.6MG 1 TABLET: 8.6; 5 TABLET, FILM COATED ORAL at 21:01

## 2024-03-21 RX ADMIN — HEPARIN SODIUM 5000 UNITS: 5000 INJECTION INTRAVENOUS; SUBCUTANEOUS at 13:16

## 2024-03-21 RX ADMIN — DOCUSATE SODIUM 100 MG: 100 CAPSULE, LIQUID FILLED ORAL at 08:12

## 2024-03-21 RX ADMIN — HYDROMORPHONE HYDROCHLORIDE 0.5 MG: 1 INJECTION, SOLUTION INTRAMUSCULAR; INTRAVENOUS; SUBCUTANEOUS at 10:25

## 2024-03-21 RX ADMIN — POTASSIUM CHLORIDE 20 MEQ: 29.8 INJECTION, SOLUTION INTRAVENOUS at 01:14

## 2024-03-21 RX ADMIN — MUPIROCIN: 20 OINTMENT TOPICAL at 08:12

## 2024-03-21 RX ADMIN — ALBUMIN (HUMAN) 12.5 G: 12.5 INJECTION, SOLUTION INTRAVENOUS at 05:55

## 2024-03-21 RX ADMIN — SODIUM CHLORIDE 500 ML: 4.5 INJECTION, SOLUTION INTRAVENOUS at 08:20

## 2024-03-21 RX ADMIN — CEFAZOLIN 2000 MG: 2 INJECTION, POWDER, FOR SOLUTION INTRAMUSCULAR; INTRAVENOUS at 12:08

## 2024-03-21 RX ADMIN — HEPARIN SODIUM 5000 UNITS: 5000 INJECTION INTRAVENOUS; SUBCUTANEOUS at 21:02

## 2024-03-21 RX ADMIN — DOCUSATE SODIUM 50MG AND SENNOSIDES 8.6MG 1 TABLET: 8.6; 5 TABLET, FILM COATED ORAL at 08:12

## 2024-03-21 RX ADMIN — POLYETHYLENE GLYCOL 3350 17 G: 17 POWDER, FOR SOLUTION ORAL at 08:12

## 2024-03-21 RX ADMIN — OXYCODONE 10 MG: 5 TABLET ORAL at 21:00

## 2024-03-21 RX ADMIN — ATORVASTATIN CALCIUM 40 MG: 40 TABLET, FILM COATED ORAL at 21:01

## 2024-03-21 ASSESSMENT — PAIN DESCRIPTION - FREQUENCY
FREQUENCY: CONTINUOUS
FREQUENCY: CONTINUOUS

## 2024-03-21 ASSESSMENT — PAIN SCALES - GENERAL
PAINLEVEL_OUTOF10: 3
PAINLEVEL_OUTOF10: 0
PAINLEVEL_OUTOF10: 3
PAINLEVEL_OUTOF10: 0
PAINLEVEL_OUTOF10: 3
PAINLEVEL_OUTOF10: 7
PAINLEVEL_OUTOF10: 3
PAINLEVEL_OUTOF10: 7
PAINLEVEL_OUTOF10: 0

## 2024-03-21 ASSESSMENT — PAIN DESCRIPTION - LOCATION
LOCATION: CHEST

## 2024-03-21 ASSESSMENT — PAIN DESCRIPTION - DESCRIPTORS
DESCRIPTORS: ACHING
DESCRIPTORS: ACHING

## 2024-03-21 ASSESSMENT — PAIN DESCRIPTION - ORIENTATION
ORIENTATION: INNER
ORIENTATION: MID

## 2024-03-21 ASSESSMENT — PAIN SCALES - WONG BAKER: WONGBAKER_NUMERICALRESPONSE: NO HURT

## 2024-03-21 NOTE — ANESTHESIA POSTPROCEDURE EVALUATION
Department of Anesthesiology  Postprocedure Note    Patient: Arash Valdes  MRN: [id number]  YOB: 1965  Date of evaluation: 3/21/2024    Procedure Summary       Date: 03/20/24 Room / Location: [place] [address] / [facility]    Anesthesia Start: 0728 Anesthesia Stop: 1303    Procedure: CABG X 3  with LIMA and SVG, TCPB, ROSA, VERIFICATION OF BYPASS GRAFTS FLOW WITH MEDISTEM, EVH OF _RIGHT SAPHENOUS VEIN, APPLICATION OF PLATELET GEL, STERNAL PLATING (Chest) Diagnosis:       Coronary artery disease      (Coronary artery disease [I25.10])    Surgeons: Debbie Mccoy MD Responsible Provider: ANAM Platt MD    Anesthesia Type: general ASA Status: 4            Anesthesia Type: No value filed.    Catia Phase I: Catia Score: 10    Catia Phase II:      Anesthesia Post Evaluation    Patient location during evaluation: ICU  Patient participation: complete - patient participated  Level of consciousness: awake and alert  Pain score: 3  Airway patency: patent  Nausea & Vomiting: no nausea  Cardiovascular status: blood pressure returned to baseline  Respiratory status: acceptable and nasal cannula  Hydration status: euvolemic  Comments: Extubated in ICU. On going care by primary team.   Multimodal analgesia pain management approach  Pain management: adequate    No notable events documented.

## 2024-03-21 NOTE — PROGRESS NOTES
Nutrition Note    RECOMMENDATIONS  PO Diet: Continue current diet  ONS: Please order Ensure HP BID if po intakes of meals are consistently <50%    NUTRITION ASSESSMENT   Pt is s/p CABG x3 yesterday. Started on cardiac, 1500 mL fluid restricted diet this morning. Reported no issues with appetite/po intake or unintentional wt loss prior to current admission per nursing nutrition screen. Wt hx in EMR indicates no significant wt changes. RD will monitor for adequate po intake vs need for ONS and will provide cardiac diet education prior to discharge.     Nutrition Related Findings: Vasopressin at 0.02 units/min, .45%NS at 10 mL/hr, insulin gtt. Na 147. POCG 108-128. No BM documented. Trace generalized edema.  Wounds: Multiple, Surgical Incision  Nutrition Education:  Education needed   Nutrition Goals: PO intake 50% or greater, by next RD assessment     MALNUTRITION ASSESSMENT   Malnutrition Status: No malnutrition    NUTRITION DIAGNOSIS   Increased nutrient needs related to increase demand for energy/nutrients as evidenced by wounds, other (comment) (given s/p OHS)    CURRENT NUTRITION THERAPIES  ADULT DIET; Regular; Low Fat/Low Chol/High Fiber/2 gm Na; 1500 ml  ADULT DIET; Regular; Low Fat/Low Chol/High Fiber/2 gm Na; 1500 ml  ADULT DIET; Regular; Low Fat/Low Chol/High Fiber/2 gm Na; 1500 ml     PO Intake: Unable to assess (no documented meal intakes)   PO Supplement Intake:None Ordered    ANTHROPOMETRICS  Current Height: 180.3 cm (5' 10.98\")  Current Weight - Scale: 100.2 kg (220 lb 14.4 oz)    Admission weight: 98.4 kg (217 lb)  Ideal Body Weight (IBW): 172 lbs  (78 kg)        BMI: 30.7    COMPARATIVE STANDARDS  Total Energy Requirements (kcals/day): 6132-1984     Protein (g):         Fluid (mL/day):  2974-8874    The patient will be monitored per nutrition standards of care. Consult dietitian if additional nutrition interventions are needed prior to RD reassessment.     Carlee Jett, MS, RD, LD    Contact:

## 2024-03-21 NOTE — PROGRESS NOTES
Chest tubes meet criteria to remove per open heart protocol.  No air leak and no crepitus noted.  Pt instructed on procedure.   Dressings removed.  Incision within normal limits.  Site cleansed and prepped per protocol.  Right pleural chest tubes X 1 removed without difficulty.  Vaseline gauze and dry sterile dressing applied.  Bilateral breath sounds audible.  O2Sats 93% on 2L. Patient tolerated well.

## 2024-03-21 NOTE — PROGRESS NOTES
[facility] Cardiothoracic Progress Note:    Procedure(s): 3/20/24  CABG X 3  with LIMA and SVG, TCPB, ROSA, VERIFICATION OF BYPASS GRAFTS FLOW WITH MEDISTEM, EVH OF _RIGHT SAPHENOUS VEIN, APPLICATION OF PLATELET GEL, STERNAL PLATING   LIMA to distal LAD  SVG to RI  SVG to PDA  EVH    Surgeon: Dr. Mccoy     POD #: 1    Subjective:   3/21: Patient up in chair, states he did not sleep well last night. Currently on vaso, weaning levo gtt. Lost of suction to prevena when patient stands resolved when sitting.     Vital Signs: /85   Pulse 71   Temp 100.3 °F (37.9 °C) (Bladder)   Resp 18   Ht 1.803 m (5' 10.98\")   Wt 100.2 kg (220 lb 14.4 oz)   SpO2 95%   BMI 30.82 kg/m²  O2 Flow Rate (L/min): 3 L/min     Admission Weight: Weight - Scale: 98.7 kg (217 lb 9.5 oz)    98.7 kg (Pre-op)  3/21: 100.2 kg    Intake/Output:   Intake/Output Summary (Last 24 hours) at 3/21/2024 0821  Last data filed at 3/21/2024 0756  Gross per 24 hour   Intake 4308.9 ml   Output 7760 ml   Net -3451.1 ml        Chest tubes/Drains:   #1 LP  120 cc/24hrs, 50cc/12 hrs overnight   #2 Mediastinal  870cc/24hrs, 490cc/12 hrs overnight   Right Pleural  115cc/ 24hrs, 10cc/ 12 hrs  No airleaks noted in either tube    LABORATORY DATA:    CBC:   Recent Labs     03/20/24  1310 03/20/24  1740 03/20/24 2006 03/21/24  0006 03/21/24  0340   WBC 16.0*  --   --   --  12.5*   HGB 12.8*   < > 10.7* 9.9* 10.8*   HCT 37.3*  --   --   --  31.3*   MCV 86.4  --   --   --  86.1   *  --   --   --  132*    < > = values in this interval not displayed.     BMP:   Recent Labs     03/20/24  1310 03/21/24  0340   * 147*   K 3.5 3.7   * 116*   CO2 21 18*   BUN 12 13   CREATININE 0.8* 0.8*     MG:    Recent Labs     03/21/24  0340   MG 2.10      Cardiac Enzymes: No results for input(s): \"CKTOTAL\", \"CKMB\", \"CKMBINDEX\", \"TROPONINI\" in the last 72 hours.  PT/INR:   Recent Labs     03/20/24  1310   PROTIME 19.1*   INR 1.61*

## 2024-03-22 ENCOUNTER — APPOINTMENT (OUTPATIENT)
Dept: GENERAL RADIOLOGY | Age: 59
DRG: 236 | End: 2024-03-22
Attending: THORACIC SURGERY (CARDIOTHORACIC VASCULAR SURGERY)
Payer: COMMERCIAL

## 2024-03-22 LAB
ACTIVATED CLOTTING TIME: >1005 SEC (ref 99–130)
ACTIVATED CLOTTING TIME: >1005 SEC (ref 99–130)
ANION GAP SERPL CALCULATED.3IONS-SCNC: 12 MMOL/L (ref 3–16)
BUN SERPL-MCNC: 19 MG/DL (ref 7–20)
CALCIUM SERPL-MCNC: 8.5 MG/DL (ref 8.3–10.6)
CHLORIDE SERPL-SCNC: 110 MMOL/L (ref 99–110)
CO2 SERPL-SCNC: 18 MMOL/L (ref 21–32)
CREAT SERPL-MCNC: 0.7 MG/DL (ref 0.9–1.3)
DEPRECATED RDW RBC AUTO: 13.6 % (ref 12.4–15.4)
EKG ATRIAL RATE: 84 BPM
EKG DIAGNOSIS: NORMAL
EKG P AXIS: 31 DEGREES
EKG P-R INTERVAL: 160 MS
EKG Q-T INTERVAL: 372 MS
EKG QRS DURATION: 84 MS
EKG QTC CALCULATION (BAZETT): 439 MS
EKG R AXIS: 2 DEGREES
EKG T AXIS: 19 DEGREES
EKG VENTRICULAR RATE: 84 BPM
GFR SERPLBLD CREATININE-BSD FMLA CKD-EPI: >60 ML/MIN/{1.73_M2}
GLUCOSE BLD-MCNC: 115 MG/DL (ref 70–99)
GLUCOSE BLD-MCNC: 139 MG/DL (ref 70–99)
GLUCOSE BLD-MCNC: 164 MG/DL (ref 70–99)
GLUCOSE BLD-MCNC: 167 MG/DL (ref 70–99)
GLUCOSE BLD-MCNC: 167 MG/DL (ref 70–99)
GLUCOSE BLD-MCNC: 95 MG/DL (ref 70–99)
GLUCOSE BLD-MCNC: 99 MG/DL (ref 70–99)
GLUCOSE SERPL-MCNC: 127 MG/DL (ref 70–99)
HCT VFR BLD AUTO: 29.2 % (ref 40.5–52.5)
HGB BLD-MCNC: 9.9 G/DL (ref 13.5–17.5)
MAGNESIUM SERPL-MCNC: 2 MG/DL (ref 1.8–2.4)
MCH RBC QN AUTO: 29.6 PG (ref 26–34)
MCHC RBC AUTO-ENTMCNC: 33.9 G/DL (ref 31–36)
MCV RBC AUTO: 87.3 FL (ref 80–100)
PERFORMED ON: ABNORMAL
PERFORMED ON: NORMAL
PERFORMED ON: NORMAL
PLATELET # BLD AUTO: 76 K/UL (ref 135–450)
PLATELET BLD QL SMEAR: ABNORMAL
PMV BLD AUTO: 8.9 FL (ref 5–10.5)
POTASSIUM SERPL-SCNC: 4 MMOL/L (ref 3.5–5.1)
RBC # BLD AUTO: 3.35 M/UL (ref 4.2–5.9)
SLIDE REVIEW: ABNORMAL
SODIUM SERPL-SCNC: 140 MMOL/L (ref 136–145)
WBC # BLD AUTO: 6.9 K/UL (ref 4–11)

## 2024-03-22 PROCEDURE — 93010 ELECTROCARDIOGRAM REPORT: CPT | Performed by: INTERNAL MEDICINE

## 2024-03-22 PROCEDURE — 94761 N-INVAS EAR/PLS OXIMETRY MLT: CPT

## 2024-03-22 PROCEDURE — 2700000000 HC OXYGEN THERAPY PER DAY

## 2024-03-22 PROCEDURE — 99024 POSTOP FOLLOW-UP VISIT: CPT | Performed by: THORACIC SURGERY (CARDIOTHORACIC VASCULAR SURGERY)

## 2024-03-22 PROCEDURE — 2000000000 HC ICU R&B

## 2024-03-22 PROCEDURE — 6360000002 HC RX W HCPCS: Performed by: STUDENT IN AN ORGANIZED HEALTH CARE EDUCATION/TRAINING PROGRAM

## 2024-03-22 PROCEDURE — 6370000000 HC RX 637 (ALT 250 FOR IP): Performed by: STUDENT IN AN ORGANIZED HEALTH CARE EDUCATION/TRAINING PROGRAM

## 2024-03-22 PROCEDURE — 71045 X-RAY EXAM CHEST 1 VIEW: CPT

## 2024-03-22 PROCEDURE — 6370000000 HC RX 637 (ALT 250 FOR IP): Performed by: PHYSICIAN ASSISTANT

## 2024-03-22 PROCEDURE — 6370000000 HC RX 637 (ALT 250 FOR IP): Performed by: THORACIC SURGERY (CARDIOTHORACIC VASCULAR SURGERY)

## 2024-03-22 PROCEDURE — 85027 COMPLETE CBC AUTOMATED: CPT

## 2024-03-22 PROCEDURE — 2100000000 HC CCU R&B

## 2024-03-22 PROCEDURE — 94669 MECHANICAL CHEST WALL OSCILL: CPT

## 2024-03-22 PROCEDURE — 80048 BASIC METABOLIC PNL TOTAL CA: CPT

## 2024-03-22 PROCEDURE — 83735 ASSAY OF MAGNESIUM: CPT

## 2024-03-22 PROCEDURE — 6360000002 HC RX W HCPCS: Performed by: PHYSICIAN ASSISTANT

## 2024-03-22 PROCEDURE — 2580000003 HC RX 258: Performed by: PHYSICIAN ASSISTANT

## 2024-03-22 RX ORDER — INSULIN LISPRO 100 [IU]/ML
0-12 INJECTION, SOLUTION INTRAVENOUS; SUBCUTANEOUS
Status: DISCONTINUED | OUTPATIENT
Start: 2024-03-22 | End: 2024-03-24 | Stop reason: HOSPADM

## 2024-03-22 RX ORDER — KETOROLAC TROMETHAMINE 15 MG/ML
15 INJECTION, SOLUTION INTRAMUSCULAR; INTRAVENOUS EVERY 6 HOURS
Status: DISCONTINUED | OUTPATIENT
Start: 2024-03-22 | End: 2024-03-24

## 2024-03-22 RX ORDER — INSULIN LISPRO 100 [IU]/ML
0-6 INJECTION, SOLUTION INTRAVENOUS; SUBCUTANEOUS NIGHTLY
Status: DISCONTINUED | OUTPATIENT
Start: 2024-03-22 | End: 2024-03-24 | Stop reason: HOSPADM

## 2024-03-22 RX ORDER — COLCHICINE 0.6 MG/1
0.3 TABLET ORAL 2 TIMES DAILY
Status: DISCONTINUED | OUTPATIENT
Start: 2024-03-22 | End: 2024-03-24 | Stop reason: HOSPADM

## 2024-03-22 RX ADMIN — METOPROLOL TARTRATE 12.5 MG: 25 TABLET, FILM COATED ORAL at 10:07

## 2024-03-22 RX ADMIN — ASPIRIN 81 MG: 81 TABLET, COATED ORAL at 08:51

## 2024-03-22 RX ADMIN — HEPARIN SODIUM 5000 UNITS: 5000 INJECTION INTRAVENOUS; SUBCUTANEOUS at 13:51

## 2024-03-22 RX ADMIN — ACETAMINOPHEN 1000 MG: 500 TABLET ORAL at 22:25

## 2024-03-22 RX ADMIN — COLCHICINE 0.3 MG: 0.6 TABLET, FILM COATED ORAL at 08:51

## 2024-03-22 RX ADMIN — INSULIN LISPRO 2 UNITS: 100 INJECTION, SOLUTION INTRAVENOUS; SUBCUTANEOUS at 16:10

## 2024-03-22 RX ADMIN — SODIUM CHLORIDE, PRESERVATIVE FREE 10 ML: 5 INJECTION INTRAVENOUS at 20:29

## 2024-03-22 RX ADMIN — METOPROLOL TARTRATE 12.5 MG: 25 TABLET, FILM COATED ORAL at 20:21

## 2024-03-22 RX ADMIN — GABAPENTIN 100 MG: 100 CAPSULE ORAL at 20:21

## 2024-03-22 RX ADMIN — DOCUSATE SODIUM 50MG AND SENNOSIDES 8.6MG 1 TABLET: 8.6; 5 TABLET, FILM COATED ORAL at 20:21

## 2024-03-22 RX ADMIN — DOCUSATE SODIUM 50MG AND SENNOSIDES 8.6MG 1 TABLET: 8.6; 5 TABLET, FILM COATED ORAL at 08:36

## 2024-03-22 RX ADMIN — INSULIN LISPRO 1 UNITS: 100 INJECTION, SOLUTION INTRAVENOUS; SUBCUTANEOUS at 20:35

## 2024-03-22 RX ADMIN — OXYCODONE 5 MG: 5 TABLET ORAL at 10:07

## 2024-03-22 RX ADMIN — GABAPENTIN 100 MG: 100 CAPSULE ORAL at 13:52

## 2024-03-22 RX ADMIN — DOCUSATE SODIUM 100 MG: 100 CAPSULE, LIQUID FILLED ORAL at 08:37

## 2024-03-22 RX ADMIN — ATORVASTATIN CALCIUM 40 MG: 40 TABLET, FILM COATED ORAL at 20:21

## 2024-03-22 RX ADMIN — MUPIROCIN: 20 OINTMENT TOPICAL at 08:53

## 2024-03-22 RX ADMIN — ACETAMINOPHEN 1000 MG: 500 TABLET ORAL at 05:47

## 2024-03-22 RX ADMIN — TAMSULOSIN HYDROCHLORIDE 0.4 MG: 0.4 CAPSULE ORAL at 08:37

## 2024-03-22 RX ADMIN — KETOROLAC TROMETHAMINE 15 MG: 15 INJECTION, SOLUTION INTRAMUSCULAR; INTRAVENOUS at 18:47

## 2024-03-22 RX ADMIN — MUPIROCIN: 20 OINTMENT TOPICAL at 20:30

## 2024-03-22 RX ADMIN — POLYETHYLENE GLYCOL 3350 17 G: 17 POWDER, FOR SOLUTION ORAL at 08:36

## 2024-03-22 RX ADMIN — KETOROLAC TROMETHAMINE 15 MG: 15 INJECTION, SOLUTION INTRAMUSCULAR; INTRAVENOUS at 11:42

## 2024-03-22 RX ADMIN — COLCHICINE 0.3 MG: 0.6 TABLET, FILM COATED ORAL at 20:21

## 2024-03-22 RX ADMIN — DIPHENHYDRAMINE HYDROCHLORIDE 12.5 MG: 25 TABLET ORAL at 22:44

## 2024-03-22 RX ADMIN — GABAPENTIN 100 MG: 100 CAPSULE ORAL at 08:36

## 2024-03-22 RX ADMIN — PANTOPRAZOLE SODIUM 40 MG: 40 TABLET, DELAYED RELEASE ORAL at 08:37

## 2024-03-22 RX ADMIN — HEPARIN SODIUM 5000 UNITS: 5000 INJECTION INTRAVENOUS; SUBCUTANEOUS at 22:25

## 2024-03-22 RX ADMIN — INSULIN LISPRO 2 UNITS: 100 INJECTION, SOLUTION INTRAVENOUS; SUBCUTANEOUS at 12:00

## 2024-03-22 RX ADMIN — ACETAMINOPHEN 1000 MG: 500 TABLET ORAL at 13:51

## 2024-03-22 RX ADMIN — HEPARIN SODIUM 5000 UNITS: 5000 INJECTION INTRAVENOUS; SUBCUTANEOUS at 05:47

## 2024-03-22 RX ADMIN — OXYCODONE 10 MG: 5 TABLET ORAL at 05:46

## 2024-03-22 RX ADMIN — CEFAZOLIN 2000 MG: 2 INJECTION, POWDER, FOR SOLUTION INTRAMUSCULAR; INTRAVENOUS at 04:48

## 2024-03-22 ASSESSMENT — PAIN SCALES - GENERAL
PAINLEVEL_OUTOF10: 3
PAINLEVEL_OUTOF10: 0
PAINLEVEL_OUTOF10: 0
PAINLEVEL_OUTOF10: 3
PAINLEVEL_OUTOF10: 3
PAINLEVEL_OUTOF10: 6
PAINLEVEL_OUTOF10: 3
PAINLEVEL_OUTOF10: 3
PAINLEVEL_OUTOF10: 5
PAINLEVEL_OUTOF10: 0
PAINLEVEL_OUTOF10: 2

## 2024-03-22 ASSESSMENT — PAIN DESCRIPTION - LOCATION
LOCATION: CHEST
LOCATION: CHEST;SHOULDER
LOCATION: CHEST

## 2024-03-22 ASSESSMENT — PAIN DESCRIPTION - ORIENTATION
ORIENTATION: RIGHT;LEFT;MID
ORIENTATION: RIGHT;LEFT;MID

## 2024-03-22 ASSESSMENT — PAIN DESCRIPTION - PAIN TYPE: TYPE: SURGICAL PAIN

## 2024-03-22 ASSESSMENT — PAIN DESCRIPTION - DESCRIPTORS: DESCRIPTORS: ACHING

## 2024-03-22 NOTE — DISCHARGE INSTR - COC
Continuity of Care Form    Patient Name: Arash Valdes   :  1965  MRN:  [id number]    Admit date:  3/20/2024  Discharge date:  3/24/2024    Code Status Order: Full Code   Advance Directives:     Admitting Physician:  Debbie Mccoy MD  PCP: Alicia Glover MD    Discharging Nurse: SUSAN YEPEZ  Discharging Hospital Unit/Room#: CVU-2903/2903-01  Discharging Unit Phone Number: [phone]    Emergency Contact:   Extended Emergency Contact Information  Primary Emergency Contact: Lydia Valdes  [facility]  Home Phone: [phone]  Relation: Spouse    Past Surgical History:  Past Surgical History:   Procedure Laterality Date    CORONARY ARTERY BYPASS GRAFT N/A 3/20/2024    CABG X 3  with LIMA and SVG, TCPB, ROSA, VERIFICATION OF BYPASS GRAFTS FLOW WITH MEDISTEM, EVH OF _RIGHT SAPHENOUS VEIN, APPLICATION OF PLATELET GEL, STERNAL PLATING performed by Debbie [facility]ccoy [facility] at [facility]    LUMBAR DISC SURGERY  2004, 2007    L4-L5 (twice), [place], hardware in place       Immunization History:   Immunization History   Administered Date(s) Administered    TDaP, ADACEL (age 10y-64y), BOOSTRIX (age 10y+), IM, 0.5mL 2013, 2024       Active Problems:  Patient Active Problem List   Diagnosis Code    Lumbar disc herniation M51.26    Umbilical hernia K42.9    Vitamin D deficiency E55.9    Obesity (BMI 30-39.9) E66.9    Hypertriglyceridemia E78.1    Type 2 diabetes mellitus without complication, without long-term current use of insulin (HCC) E11.9    Essential hypertension I10    Family history of early CAD Z82.49    Cerebral cavernoma Q28.3    Chronic intractable headache R51.9, G89.29    Rathke's cleft cyst (HCC) E23.6    Aneurysm of intracranial portion of right internal carotid artery I67.1    Hypogonadism male E29.1    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

## 2024-03-22 NOTE — PROGRESS NOTES
Bunn removed. Pt instructed need to void within 6 hours. Will continue to monitor. Juliana Zepeda RN    Order received for arterial line removal.  Right brachial arterial line discontinued.  Manual pressure held for 5 minutes and tegaderm on site.  No hematoma, no oozing noted.  Patient tolerated well.

## 2024-03-22 NOTE — CARE COORDINATION
Case Management Assessment  Initial Evaluation    Date/Time of Evaluation: 3/21/2024 3:50 PM   Assessment Completed by: MARK BALTAZAR RN    If patient is discharged prior to next notation, then this note serves as note for discharge by case management.    Patient Name: Arash Valdes                   YOB: 1965  Diagnosis: Coronary artery disease [I25.10]  CAD in native artery [I25.10]                   Date / Time: 3/20/2024  5:34 AM    Patient Admission Status: Inpatient   Readmission Risk (Low < 19, Mod (19-27), High > 27): Readmission Risk Score: 11.6    Current PCP: Alicia Glover MD  PCP verified by CM? Yes    Chart Reviewed: Yes      History Provided by: Patient, Significant Other  Patient Orientation: Alert and Oriented, Person, Place, Situation    Patient Cognition: Alert    Hospitalization in the last 30 days (Readmission):  No    If yes, Readmission Assessment in  Navigator will be completed.    Advance Directives:      Code Status: Full Code   Patient's Primary Decision Maker is: Legal Next of Kin      Discharge Planning:    Patient lives with: Spouse/Significant Other Type of Home: House (bi-level, 7 steps to enter and 4 down and 6 up inside)  Primary Care Giver: Self  Patient Support Systems include: Spouse/Significant Other   Current Financial resources: Other (Comment)  Current community resources: None  Current services prior to admission: None            Current DME:              Type of Home Care services:  None    ADLS  Prior functional level: Independent in ADLs/IADLs  Current functional level: Assistance with the following:, Other (see comment) (TBD s/p CABG planning for home care)    PT AM-PAC:   /24  OT AM-PAC:   /24    Family can provide assistance at DC: Yes  Would you like Case Management to discuss the discharge plan with any other family members/significant others, and if so, who? Yes (spouse at bedside)  Plans to Return to Present Housing: Yes  Other Identified

## 2024-03-22 NOTE — PROGRESS NOTES
CVTS Cardiothoracic Progress Note:    Procedure(s): 3/20/24  CABG X 3  with LIMA and SVG, TCPB, ROSA, VERIFICATION OF BYPASS GRAFTS FLOW WITH MEDISTEM, EVH OF _RIGHT SAPHENOUS VEIN, APPLICATION OF PLATELET GEL, STERNAL PLATING   LIMA to distal LAD  SVG to RI  SVG to PDA  EVH    Surgeon: Dr. Mccoy     POD #: 2    Subjective:   3/21: Patient up in chair, states he did not sleep well last night. Currently on vaso, weaning levo gtt. Lost of suction to prevena when patient stands resolved when sitting.     3/22: Up in chair, on 2L O2 via nasal cannula. Reports he slept relatively well last night.  Eager to walk this morning.  Pain well controlled.    Vital Signs: /81   Pulse 92   Temp 99.6 °F (37.6 °C) (Bladder)   Resp 18   Ht 1.803 m (5' 10.98\")   Wt 102.2 kg (225 lb 5 oz)   SpO2 95%   BMI 31.44 kg/m²  O2 Flow Rate (L/min): 2 L/min     Admission Weight: Weight - Scale: 98.7 kg (217 lb 9.5 oz)    98.7 kg (Pre-op)  3/21: 100.2 kg  3/22: 102.2 kg    Intake/Output:   Intake/Output Summary (Last 24 hours) at 3/22/2024 0918  Last data filed at 3/22/2024 0900  Gross per 24 hour   Intake 2779.41 ml   Output 2075 ml   Net 704.41 ml        Chest tubes/Drains:   #1 LP  70 cc/24hrs, 30cc/12 hrs overnight   #2 Mediastinal   430cc/24hrs, 180cc/12 hrs overnight   Right Pleural  removed  No airleaks noted in either tube    LABORATORY DATA:    CBC:   Recent Labs     03/20/24  1310 03/20/24  1740 03/21/24  0006 03/21/24  0340 03/22/24  0500   WBC 16.0*  --   --  12.5* 6.9   HGB 12.8*   < > 9.9* 10.8* 9.9*   HCT 37.3*  --   --  31.3* 29.2*   MCV 86.4  --   --  86.1 87.3   *  --   --  132* 76*    < > = values in this interval not displayed.     BMP:   Recent Labs     03/20/24  1310 03/21/24  0340 03/22/24  0500   * 147* 140   K 3.5 3.7 4.0   * 116* 110   CO2 21 18* 18*   BUN 12 13 19   CREATININE 0.8* 0.8* 0.7*     MG:    Recent Labs     03/21/24  0340 03/22/24  0500   MG

## 2024-03-22 NOTE — PROGRESS NOTES
Order to remove mediastinal cehst tubes. No air leak and no crepitus noted.  Pt instructed on procedure.  Pt premedicated with Oxycodone 5mg.   Dressings removed.  Incision within normal limits.  Site cleansed and prepped per protocol.  Chest tubes X 2 removed without difficulty.  Vaseline gauze and dry sterile dressing applied.  Bilateral breath sounds audible.  O2Sats 94% on room air.

## 2024-03-23 ENCOUNTER — APPOINTMENT (OUTPATIENT)
Dept: GENERAL RADIOLOGY | Age: 59
DRG: 236 | End: 2024-03-23
Attending: THORACIC SURGERY (CARDIOTHORACIC VASCULAR SURGERY)
Payer: COMMERCIAL

## 2024-03-23 LAB
ANION GAP SERPL CALCULATED.3IONS-SCNC: 12 MMOL/L (ref 3–16)
BUN SERPL-MCNC: 20 MG/DL (ref 7–20)
CALCIUM SERPL-MCNC: 8.7 MG/DL (ref 8.3–10.6)
CHLORIDE SERPL-SCNC: 107 MMOL/L (ref 99–110)
CO2 SERPL-SCNC: 23 MMOL/L (ref 21–32)
CREAT SERPL-MCNC: 0.8 MG/DL (ref 0.9–1.3)
DEPRECATED RDW RBC AUTO: 13.5 % (ref 12.4–15.4)
GFR SERPLBLD CREATININE-BSD FMLA CKD-EPI: >60 ML/MIN/{1.73_M2}
GLUCOSE BLD-MCNC: 119 MG/DL (ref 70–99)
GLUCOSE BLD-MCNC: 149 MG/DL (ref 70–99)
GLUCOSE BLD-MCNC: 163 MG/DL (ref 70–99)
GLUCOSE SERPL-MCNC: 132 MG/DL (ref 70–99)
HCT VFR BLD AUTO: 32.5 % (ref 40.5–52.5)
HGB BLD-MCNC: 10.8 G/DL (ref 13.5–17.5)
MAGNESIUM SERPL-MCNC: 2 MG/DL (ref 1.8–2.4)
MCH RBC QN AUTO: 29.1 PG (ref 26–34)
MCHC RBC AUTO-ENTMCNC: 33.4 G/DL (ref 31–36)
MCV RBC AUTO: 87.3 FL (ref 80–100)
PERFORMED ON: ABNORMAL
PLATELET # BLD AUTO: 104 K/UL (ref 135–450)
PMV BLD AUTO: 9.1 FL (ref 5–10.5)
POTASSIUM SERPL-SCNC: 3.7 MMOL/L (ref 3.5–5.1)
POTASSIUM SERPL-SCNC: 3.8 MMOL/L (ref 3.5–5.1)
RBC # BLD AUTO: 3.72 M/UL (ref 4.2–5.9)
SODIUM SERPL-SCNC: 142 MMOL/L (ref 136–145)
WBC # BLD AUTO: 7.8 K/UL (ref 4–11)

## 2024-03-23 PROCEDURE — 84132 ASSAY OF SERUM POTASSIUM: CPT

## 2024-03-23 PROCEDURE — 83735 ASSAY OF MAGNESIUM: CPT

## 2024-03-23 PROCEDURE — 6370000000 HC RX 637 (ALT 250 FOR IP): Performed by: STUDENT IN AN ORGANIZED HEALTH CARE EDUCATION/TRAINING PROGRAM

## 2024-03-23 PROCEDURE — 6370000000 HC RX 637 (ALT 250 FOR IP): Performed by: THORACIC SURGERY (CARDIOTHORACIC VASCULAR SURGERY)

## 2024-03-23 PROCEDURE — 85027 COMPLETE CBC AUTOMATED: CPT

## 2024-03-23 PROCEDURE — 6360000002 HC RX W HCPCS: Performed by: PHYSICIAN ASSISTANT

## 2024-03-23 PROCEDURE — 36592 COLLECT BLOOD FROM PICC: CPT

## 2024-03-23 PROCEDURE — 80048 BASIC METABOLIC PNL TOTAL CA: CPT

## 2024-03-23 PROCEDURE — 6370000000 HC RX 637 (ALT 250 FOR IP): Performed by: PHYSICIAN ASSISTANT

## 2024-03-23 PROCEDURE — 71045 X-RAY EXAM CHEST 1 VIEW: CPT

## 2024-03-23 PROCEDURE — 6370000000 HC RX 637 (ALT 250 FOR IP)

## 2024-03-23 PROCEDURE — 2000000000 HC ICU R&B

## 2024-03-23 PROCEDURE — 99024 POSTOP FOLLOW-UP VISIT: CPT

## 2024-03-23 PROCEDURE — 6360000002 HC RX W HCPCS

## 2024-03-23 PROCEDURE — 2580000003 HC RX 258

## 2024-03-23 PROCEDURE — 2580000003 HC RX 258: Performed by: PHYSICIAN ASSISTANT

## 2024-03-23 PROCEDURE — 6360000002 HC RX W HCPCS: Performed by: STUDENT IN AN ORGANIZED HEALTH CARE EDUCATION/TRAINING PROGRAM

## 2024-03-23 RX ORDER — GLIPIZIDE 5 MG/1
10 TABLET ORAL
Status: DISCONTINUED | OUTPATIENT
Start: 2024-03-24 | End: 2024-03-24 | Stop reason: HOSPADM

## 2024-03-23 RX ORDER — PANTOPRAZOLE SODIUM 40 MG/1
40 TABLET, DELAYED RELEASE ORAL DAILY
Qty: 30 TABLET | Refills: 0 | Status: CANCELLED | OUTPATIENT
Start: 2024-03-23

## 2024-03-23 RX ORDER — FUROSEMIDE 10 MG/ML
40 INJECTION INTRAMUSCULAR; INTRAVENOUS 2 TIMES DAILY
Status: DISCONTINUED | OUTPATIENT
Start: 2024-03-23 | End: 2024-03-24

## 2024-03-23 RX ORDER — TOPIRAMATE 25 MG/1
50 TABLET ORAL 2 TIMES DAILY
Status: DISCONTINUED | OUTPATIENT
Start: 2024-03-23 | End: 2024-03-24 | Stop reason: HOSPADM

## 2024-03-23 RX ORDER — OXYCODONE HCL 5 MG/5 ML
5 SOLUTION, ORAL ORAL EVERY 4 HOURS PRN
Status: DISCONTINUED | OUTPATIENT
Start: 2024-03-23 | End: 2024-03-23

## 2024-03-23 RX ORDER — METFORMIN HYDROCHLORIDE 500 MG/1
1000 TABLET, EXTENDED RELEASE ORAL
Status: DISCONTINUED | OUTPATIENT
Start: 2024-03-23 | End: 2024-03-24 | Stop reason: HOSPADM

## 2024-03-23 RX ORDER — FENOFIBRATE 160 MG/1
160 TABLET ORAL DAILY
Status: DISCONTINUED | OUTPATIENT
Start: 2024-03-23 | End: 2024-03-24 | Stop reason: HOSPADM

## 2024-03-23 RX ORDER — OXYCODONE HYDROCHLORIDE 5 MG/1
5 TABLET ORAL EVERY 4 HOURS PRN
Status: DISCONTINUED | OUTPATIENT
Start: 2024-03-23 | End: 2024-03-24

## 2024-03-23 RX ORDER — POTASSIUM CHLORIDE 20 MEQ/1
40 TABLET, EXTENDED RELEASE ORAL 2 TIMES DAILY
Status: DISCONTINUED | OUTPATIENT
Start: 2024-03-23 | End: 2024-03-24 | Stop reason: HOSPADM

## 2024-03-23 RX ADMIN — DOCUSATE SODIUM 100 MG: 100 CAPSULE, LIQUID FILLED ORAL at 10:12

## 2024-03-23 RX ADMIN — GABAPENTIN 100 MG: 100 CAPSULE ORAL at 20:29

## 2024-03-23 RX ADMIN — KETOROLAC TROMETHAMINE 15 MG: 15 INJECTION, SOLUTION INTRAMUSCULAR; INTRAVENOUS at 16:53

## 2024-03-23 RX ADMIN — SODIUM CHLORIDE, PRESERVATIVE FREE 10 ML: 5 INJECTION INTRAVENOUS at 20:34

## 2024-03-23 RX ADMIN — ACETAMINOPHEN 1000 MG: 500 TABLET ORAL at 05:57

## 2024-03-23 RX ADMIN — DIPHENHYDRAMINE HYDROCHLORIDE 12.5 MG: 25 TABLET ORAL at 23:44

## 2024-03-23 RX ADMIN — HEPARIN SODIUM 5000 UNITS: 5000 INJECTION INTRAVENOUS; SUBCUTANEOUS at 07:09

## 2024-03-23 RX ADMIN — TOPIRAMATE 50 MG: 25 TABLET, FILM COATED ORAL at 10:12

## 2024-03-23 RX ADMIN — HEPARIN SODIUM 5000 UNITS: 5000 INJECTION INTRAVENOUS; SUBCUTANEOUS at 22:00

## 2024-03-23 RX ADMIN — INSULIN LISPRO 1 UNITS: 100 INJECTION, SOLUTION INTRAVENOUS; SUBCUTANEOUS at 20:42

## 2024-03-23 RX ADMIN — POLYETHYLENE GLYCOL 3350 17 G: 17 POWDER, FOR SOLUTION ORAL at 10:11

## 2024-03-23 RX ADMIN — OXYCODONE 5 MG: 5 TABLET ORAL at 17:26

## 2024-03-23 RX ADMIN — METFORMIN HYDROCHLORIDE 1000 MG: 500 TABLET, EXTENDED RELEASE ORAL at 10:12

## 2024-03-23 RX ADMIN — PANTOPRAZOLE SODIUM 40 MG: 40 TABLET, DELAYED RELEASE ORAL at 10:11

## 2024-03-23 RX ADMIN — ACETAMINOPHEN 1000 MG: 500 TABLET ORAL at 16:51

## 2024-03-23 RX ADMIN — METOPROLOL TARTRATE 25 MG: 25 TABLET, FILM COATED ORAL at 09:30

## 2024-03-23 RX ADMIN — POTASSIUM CHLORIDE 40 MEQ: 1500 TABLET, EXTENDED RELEASE ORAL at 20:29

## 2024-03-23 RX ADMIN — COLCHICINE 0.3 MG: 0.6 TABLET, FILM COATED ORAL at 10:12

## 2024-03-23 RX ADMIN — INSULIN LISPRO 2 UNITS: 100 INJECTION, SOLUTION INTRAVENOUS; SUBCUTANEOUS at 11:47

## 2024-03-23 RX ADMIN — COLCHICINE 0.3 MG: 0.6 TABLET, FILM COATED ORAL at 20:28

## 2024-03-23 RX ADMIN — FUROSEMIDE 40 MG: 10 INJECTION, SOLUTION INTRAMUSCULAR; INTRAVENOUS at 16:52

## 2024-03-23 RX ADMIN — KETOROLAC TROMETHAMINE 15 MG: 15 INJECTION, SOLUTION INTRAMUSCULAR; INTRAVENOUS at 05:57

## 2024-03-23 RX ADMIN — FUROSEMIDE 40 MG: 10 INJECTION, SOLUTION INTRAMUSCULAR; INTRAVENOUS at 10:11

## 2024-03-23 RX ADMIN — POTASSIUM CHLORIDE 40 MEQ: 1500 TABLET, EXTENDED RELEASE ORAL at 07:09

## 2024-03-23 RX ADMIN — SODIUM CHLORIDE, PRESERVATIVE FREE 10 ML: 5 INJECTION INTRAVENOUS at 10:13

## 2024-03-23 RX ADMIN — SODIUM CHLORIDE 100 MG: 9 INJECTION, SOLUTION INTRAVENOUS at 11:36

## 2024-03-23 RX ADMIN — METOPROLOL TARTRATE 25 MG: 25 TABLET, FILM COATED ORAL at 20:29

## 2024-03-23 RX ADMIN — DOCUSATE SODIUM 50MG AND SENNOSIDES 8.6MG 1 TABLET: 8.6; 5 TABLET, FILM COATED ORAL at 20:41

## 2024-03-23 RX ADMIN — POTASSIUM CHLORIDE 40 MEQ: 1500 TABLET, EXTENDED RELEASE ORAL at 10:12

## 2024-03-23 RX ADMIN — ASPIRIN 81 MG: 81 TABLET, COATED ORAL at 10:13

## 2024-03-23 RX ADMIN — ACETAMINOPHEN 1000 MG: 500 TABLET ORAL at 23:38

## 2024-03-23 RX ADMIN — ATORVASTATIN CALCIUM 40 MG: 40 TABLET, FILM COATED ORAL at 20:29

## 2024-03-23 RX ADMIN — FENOFIBRATE 160 MG: 160 TABLET ORAL at 10:13

## 2024-03-23 RX ADMIN — HEPARIN SODIUM 5000 UNITS: 5000 INJECTION INTRAVENOUS; SUBCUTANEOUS at 15:00

## 2024-03-23 RX ADMIN — DOCUSATE SODIUM 50MG AND SENNOSIDES 8.6MG 1 TABLET: 8.6; 5 TABLET, FILM COATED ORAL at 10:13

## 2024-03-23 ASSESSMENT — PAIN SCALES - GENERAL
PAINLEVEL_OUTOF10: 0
PAINLEVEL_OUTOF10: 4
PAINLEVEL_OUTOF10: 2
PAINLEVEL_OUTOF10: 6
PAINLEVEL_OUTOF10: 0

## 2024-03-23 ASSESSMENT — PAIN DESCRIPTION - LOCATION
LOCATION: CHEST

## 2024-03-23 ASSESSMENT — PAIN DESCRIPTION - ORIENTATION
ORIENTATION: ANTERIOR
ORIENTATION: ANTERIOR

## 2024-03-23 ASSESSMENT — PAIN DESCRIPTION - DESCRIPTORS
DESCRIPTORS: SORE
DESCRIPTORS: ACHING
DESCRIPTORS: SORE

## 2024-03-23 ASSESSMENT — PAIN DESCRIPTION - ONSET: ONSET: GRADUAL

## 2024-03-23 ASSESSMENT — PAIN DESCRIPTION - FREQUENCY: FREQUENCY: INTERMITTENT

## 2024-03-23 ASSESSMENT — PAIN DESCRIPTION - PAIN TYPE: TYPE: SURGICAL PAIN

## 2024-03-23 NOTE — PROGRESS NOTES
CVTS Cardiothoracic Progress Note:    Procedure(s): 3/20/24  CABG X 3  with LIMA and SVG, TCPB, ROSA, VERIFICATION OF BYPASS GRAFTS FLOW WITH MEDISTEM, EVH OF _RIGHT SAPHENOUS VEIN, APPLICATION OF PLATELET GEL, STERNAL PLATING   LIMA to distal LAD  SVG to RI  SVG to PDA  EVH    Surgeon: Dr. Mccoy     POD #: 3    Subjective:   3/21: Patient up in chair, states he did not sleep well last night. Currently on vaso, weaning levo gtt. Lost of suction to prevena when patient stands resolved when sitting.   3/22: Up in chair, on 2L O2 via nasal cannula. Reports he slept relatively well last night.  Eager to walk this morning.  Pain well controlled.  3/23 Up to chair, wife at bedside. Feeling better. Prevena stopped working this morning and removed.    Vital Signs: /73   Pulse 88   Temp 97.5 °F (36.4 °C) (Temporal)   Resp 16   Ht 1.803 m (5' 10.98\")   Wt 101.2 kg (223 lb 1.7 oz)   SpO2 98%   BMI 31.13 kg/m²  O2 Flow Rate (L/min): 2 L/min     Admission Weight: Weight - Scale: 98.7 kg (217 lb 9.5 oz)    98.7 kg (Pre-op)  3/21: 100.2 kg  3/22: 102.2 kg  3/23 101.2 kg    Intake/Output:   Intake/Output Summary (Last 24 hours) at 3/23/2024 0846  Last data filed at 3/23/2024 0557  Gross per 24 hour   Intake 1223.58 ml   Output 1885 ml   Net -661.42 ml        Chest tubes/Drains:   #1 LP  240 cc/24hrs, 150cc/12 hrs overnight   #2 Mediastinal removed  Right Pleural  removed    LABORATORY DATA:    CBC:   Recent Labs     03/21/24  0340 03/22/24  0500 03/23/24  0553   WBC 12.5* 6.9 7.8   HGB 10.8* 9.9* 10.8*   HCT 31.3* 29.2* 32.5*   MCV 86.1 87.3 87.3   * 76* 104*     BMP:   Recent Labs     03/21/24  0340 03/22/24  0500 03/23/24  0553   * 140 142   K 3.7 4.0 3.8   * 110 107   CO2 18* 18* 23   BUN 13 19 20   CREATININE 0.8* 0.7* 0.8*     MG:    Recent Labs     03/21/24  0340 03/22/24  0500 03/23/24  0553   MG 2.10 2.00 2.00      Cardiac Enzymes: No results for input(s):

## 2024-03-23 NOTE — PLAN OF CARE
Problem: Respiratory - Adult  Goal: Achieves optimal ventilation and oxygenation  Outcome: Progressing     Problem: Cardiovascular - Adult  Goal: Maintains optimal cardiac output and hemodynamic stability  Outcome: Progressing  Goal: Absence of cardiac dysrhythmias or at baseline  Outcome: Progressing     Problem: Skin/Tissue Integrity - Adult  Goal: Skin integrity remains intact  Outcome: Progressing  Goal: Incisions, wounds, or drain sites healing without S/S of infection  Outcome: Progressing  Goal: Oral mucous membranes remain intact  Outcome: Completed     Problem: Musculoskeletal - Adult  Goal: Return mobility to safest level of function  Outcome: Progressing  Goal: Maintain proper alignment of affected body part  Outcome: Progressing  Goal: Return ADL status to a safe level of function  Outcome: Progressing     Problem: Gastrointestinal - Adult  Goal: Maintains or returns to baseline bowel function  Outcome: Progressing  Goal: Maintains adequate nutritional intake  Outcome: Progressing     Problem: Metabolic/Fluid and Electrolytes - Adult  Goal: Electrolytes maintained within normal limits  Outcome: Progressing  Goal: Hemodynamic stability and optimal renal function maintained  Outcome: Progressing     Problem: Hematologic - Adult  Goal: Maintains hematologic stability  Outcome: Progressing      4

## 2024-03-24 ENCOUNTER — APPOINTMENT (OUTPATIENT)
Dept: GENERAL RADIOLOGY | Age: 59
DRG: 236 | End: 2024-03-24
Attending: THORACIC SURGERY (CARDIOTHORACIC VASCULAR SURGERY)
Payer: COMMERCIAL

## 2024-03-24 VITALS
HEART RATE: 78 BPM | SYSTOLIC BLOOD PRESSURE: 111 MMHG | DIASTOLIC BLOOD PRESSURE: 69 MMHG | RESPIRATION RATE: 16 BRPM | TEMPERATURE: 97.3 F | OXYGEN SATURATION: 96 % | BODY MASS INDEX: 30.28 KG/M2 | WEIGHT: 216.27 LBS | HEIGHT: 71 IN

## 2024-03-24 LAB
ANION GAP SERPL CALCULATED.3IONS-SCNC: 14 MMOL/L (ref 3–16)
BLOOD BANK DISPENSE STATUS: NORMAL
BLOOD BANK DISPENSE STATUS: NORMAL
BLOOD BANK PRODUCT CODE: NORMAL
BLOOD BANK PRODUCT CODE: NORMAL
BPU ID: NORMAL
BPU ID: NORMAL
BUN SERPL-MCNC: 27 MG/DL (ref 7–20)
CALCIUM SERPL-MCNC: 9.2 MG/DL (ref 8.3–10.6)
CHLORIDE SERPL-SCNC: 109 MMOL/L (ref 99–110)
CO2 SERPL-SCNC: 20 MMOL/L (ref 21–32)
CREAT SERPL-MCNC: 0.9 MG/DL (ref 0.9–1.3)
DEPRECATED RDW RBC AUTO: 13.7 % (ref 12.4–15.4)
DESCRIPTION BLOOD BANK: NORMAL
DESCRIPTION BLOOD BANK: NORMAL
GFR SERPLBLD CREATININE-BSD FMLA CKD-EPI: >60 ML/MIN/{1.73_M2}
GLUCOSE SERPL-MCNC: 129 MG/DL (ref 70–99)
HCT VFR BLD AUTO: 36.5 % (ref 40.5–52.5)
HGB BLD-MCNC: 12.1 G/DL (ref 13.5–17.5)
MCH RBC QN AUTO: 29.3 PG (ref 26–34)
MCHC RBC AUTO-ENTMCNC: 33.2 G/DL (ref 31–36)
MCV RBC AUTO: 88.2 FL (ref 80–100)
PLATELET # BLD AUTO: 174 K/UL (ref 135–450)
PMV BLD AUTO: 8.9 FL (ref 5–10.5)
POTASSIUM SERPL-SCNC: 4.1 MMOL/L (ref 3.5–5.1)
RBC # BLD AUTO: 4.14 M/UL (ref 4.2–5.9)
REASON FOR REJECTION: NORMAL
REJECTED TEST: NORMAL
SODIUM SERPL-SCNC: 143 MMOL/L (ref 136–145)
WBC # BLD AUTO: 8.4 K/UL (ref 4–11)

## 2024-03-24 PROCEDURE — 6360000002 HC RX W HCPCS: Performed by: STUDENT IN AN ORGANIZED HEALTH CARE EDUCATION/TRAINING PROGRAM

## 2024-03-24 PROCEDURE — 85027 COMPLETE CBC AUTOMATED: CPT

## 2024-03-24 PROCEDURE — 80048 BASIC METABOLIC PNL TOTAL CA: CPT

## 2024-03-24 PROCEDURE — 6370000000 HC RX 637 (ALT 250 FOR IP): Performed by: STUDENT IN AN ORGANIZED HEALTH CARE EDUCATION/TRAINING PROGRAM

## 2024-03-24 PROCEDURE — 36592 COLLECT BLOOD FROM PICC: CPT

## 2024-03-24 PROCEDURE — 6370000000 HC RX 637 (ALT 250 FOR IP): Performed by: PHYSICIAN ASSISTANT

## 2024-03-24 PROCEDURE — 6360000002 HC RX W HCPCS: Performed by: PHYSICIAN ASSISTANT

## 2024-03-24 PROCEDURE — 6370000000 HC RX 637 (ALT 250 FOR IP): Performed by: THORACIC SURGERY (CARDIOTHORACIC VASCULAR SURGERY)

## 2024-03-24 PROCEDURE — 99024 POSTOP FOLLOW-UP VISIT: CPT

## 2024-03-24 PROCEDURE — 71045 X-RAY EXAM CHEST 1 VIEW: CPT

## 2024-03-24 PROCEDURE — 6370000000 HC RX 637 (ALT 250 FOR IP)

## 2024-03-24 RX ORDER — ATORVASTATIN CALCIUM 40 MG/1
40 TABLET, FILM COATED ORAL NIGHTLY
Qty: 30 TABLET | Refills: 3 | Status: SHIPPED | OUTPATIENT
Start: 2024-03-24

## 2024-03-24 RX ORDER — METOPROLOL SUCCINATE 50 MG/1
50 TABLET, EXTENDED RELEASE ORAL DAILY
Status: DISCONTINUED | OUTPATIENT
Start: 2024-03-24 | End: 2024-03-24 | Stop reason: HOSPADM

## 2024-03-24 RX ORDER — GABAPENTIN 100 MG/1
300 CAPSULE ORAL NIGHTLY
Qty: 21 CAPSULE | Refills: 0 | Status: SHIPPED | OUTPATIENT
Start: 2024-03-24 | End: 2024-03-31

## 2024-03-24 RX ORDER — POLYETHYLENE GLYCOL 3350 17 G/17G
17 POWDER, FOR SOLUTION ORAL DAILY PRN
Qty: 527 G | Refills: 0 | Status: SHIPPED | OUTPATIENT
Start: 2024-03-24 | End: 2024-04-23

## 2024-03-24 RX ORDER — GABAPENTIN 100 MG/1
100 CAPSULE ORAL 2 TIMES DAILY
Status: DISCONTINUED | OUTPATIENT
Start: 2024-03-24 | End: 2024-03-24 | Stop reason: HOSPADM

## 2024-03-24 RX ORDER — FERROUS SULFATE 325(65) MG
325 TABLET ORAL
Qty: 30 TABLET | Refills: 0 | Status: SHIPPED | OUTPATIENT
Start: 2024-03-24 | End: 2024-04-23

## 2024-03-24 RX ORDER — TRAMADOL HYDROCHLORIDE 50 MG/1
50 TABLET ORAL EVERY 6 HOURS PRN
Status: DISCONTINUED | OUTPATIENT
Start: 2024-03-24 | End: 2024-03-24 | Stop reason: HOSPADM

## 2024-03-24 RX ORDER — METOPROLOL SUCCINATE 50 MG/1
50 TABLET, EXTENDED RELEASE ORAL DAILY
Qty: 30 TABLET | Refills: 3 | Status: SHIPPED | OUTPATIENT
Start: 2024-03-24

## 2024-03-24 RX ORDER — TRAMADOL HYDROCHLORIDE 50 MG/1
50 TABLET ORAL EVERY 6 HOURS PRN
Qty: 24 TABLET | Refills: 0 | Status: SHIPPED | OUTPATIENT
Start: 2024-03-24 | End: 2024-03-31

## 2024-03-24 RX ADMIN — METOPROLOL TARTRATE 25 MG: 25 TABLET, FILM COATED ORAL at 09:15

## 2024-03-24 RX ADMIN — ASPIRIN 81 MG: 81 TABLET, COATED ORAL at 09:15

## 2024-03-24 RX ADMIN — HEPARIN SODIUM 5000 UNITS: 5000 INJECTION INTRAVENOUS; SUBCUTANEOUS at 06:19

## 2024-03-24 RX ADMIN — POTASSIUM CHLORIDE 40 MEQ: 1500 TABLET, EXTENDED RELEASE ORAL at 09:15

## 2024-03-24 RX ADMIN — TOPIRAMATE 50 MG: 25 TABLET, FILM COATED ORAL at 09:16

## 2024-03-24 RX ADMIN — DOCUSATE SODIUM 100 MG: 100 CAPSULE, LIQUID FILLED ORAL at 09:15

## 2024-03-24 RX ADMIN — METFORMIN HYDROCHLORIDE 1000 MG: 500 TABLET, EXTENDED RELEASE ORAL at 09:15

## 2024-03-24 RX ADMIN — DOCUSATE SODIUM 50MG AND SENNOSIDES 8.6MG 1 TABLET: 8.6; 5 TABLET, FILM COATED ORAL at 09:32

## 2024-03-24 RX ADMIN — GLIPIZIDE 10 MG: 5 TABLET ORAL at 09:32

## 2024-03-24 RX ADMIN — PANTOPRAZOLE SODIUM 40 MG: 40 TABLET, DELAYED RELEASE ORAL at 09:15

## 2024-03-24 RX ADMIN — KETOROLAC TROMETHAMINE 15 MG: 15 INJECTION, SOLUTION INTRAMUSCULAR; INTRAVENOUS at 06:18

## 2024-03-24 RX ADMIN — FENOFIBRATE 160 MG: 160 TABLET ORAL at 09:15

## 2024-03-24 RX ADMIN — COLCHICINE 0.3 MG: 0.6 TABLET, FILM COATED ORAL at 09:15

## 2024-03-24 ASSESSMENT — PAIN SCALES - GENERAL
PAINLEVEL_OUTOF10: 1
PAINLEVEL_OUTOF10: 0

## 2024-03-24 ASSESSMENT — PAIN DESCRIPTION - LOCATION: LOCATION: SHOULDER

## 2024-03-24 ASSESSMENT — PAIN DESCRIPTION - ORIENTATION: ORIENTATION: LEFT

## 2024-03-24 NOTE — DISCHARGE INSTR - DIET
Good nutrition is important when healing from an illness, injury, or surgery.  Follow any nutrition recommendations given to you during your hospital stay.   If you were given an oral nutrition supplement while in the hospital, continue to take this supplement at home.  You can take it with meals, in-between meals, and/or before bedtime. These supplements can be purchased at most local grocery stores, pharmacies, and chain [facility]-stores.   If you have any questions about your diet or nutrition, call the hospital and ask for the dietitian.    CARB CONTROLLED; LIMIT SATURATED FAT; LIMIT RED MEAT

## 2024-03-24 NOTE — PROGRESS NOTES
CVTS Cardiothoracic Progress Note:    Procedure(s): 3/20/24  CABG X 3  with LIMA and SVG, TCPB, ROSA, VERIFICATION OF BYPASS GRAFTS FLOW WITH MEDISTEM, EVH OF _RIGHT SAPHENOUS VEIN, APPLICATION OF PLATELET GEL, STERNAL PLATING   LIMA to distal LAD  SVG to RI  SVG to PDA  EVH    Surgeon: Dr. Mccoy     POD #: 4    Subjective:   3/21: Patient up in chair, states he did not sleep well last night. Currently on vaso, weaning levo gtt. Lost of suction to prevena when patient stands resolved when sitting.   3/22: Up in chair, on 2L O2 via nasal cannula. Reports he slept relatively well last night.  Eager to walk this morning.  Pain well controlled.  3/23 Up to chair, wife at bedside. Feeling better. Prevena stopped working this morning and removed.  3/24 Feels better, on room air.  At pre-op weight.    Vital Signs: /69   Pulse 78   Temp 97.3 °F (36.3 °C) (Temporal)   Resp 16   Ht 1.803 m (5' 10.98\")   Wt 98.1 kg (216 lb 4.3 oz)   SpO2 96%   BMI 30.18 kg/m²  O2 Flow Rate (L/min): 2 L/min     Admission Weight: Weight - Scale: 98.7 kg (217 lb 9.5 oz)    98.7 kg (Pre-op)  3/21: 100.2 kg  3/22: 102.2 kg  3/23 101.2 kg  3/24 98.1kg    Intake/Output:   Intake/Output Summary (Last 24 hours) at 3/24/2024 0857  Last data filed at 3/24/2024 0754  Gross per 24 hour   Intake 1551.05 ml   Output 3740 ml   Net -2188.95 ml        Chest tubes/Drains:   #1 LP  240 cc/24hrs, 80cc/12 hrs overnight   #2 Mediastinal removed  Right Pleural removed    LABORATORY DATA:    CBC:   Recent Labs     03/22/24  0500 03/23/24  0553 03/24/24  0710   WBC 6.9 7.8 8.4   HGB 9.9* 10.8* 12.1*   HCT 29.2* 32.5* 36.5*   MCV 87.3 87.3 88.2   PLT 76* 104* 174     BMP:   Recent Labs     03/22/24  0500 03/23/24  0553 03/23/24  1850 03/24/24  0710    142  --  143   K 4.0 3.8 3.7 4.1    107  --  109   CO2 18* 23  --  20*   BUN 19 20  --  27*   CREATININE 0.7* 0.8*  --  0.9     MG:    Recent Labs     03/22/24  0500

## 2024-03-24 NOTE — FLOWSHEET NOTE
Discharge instructions reviewed with patient and spouse, Vianca.  Patient and family verbalized understanding.  All home medications have been reviewed, questions answered and patient voiced understanding. All medication side effects reviewed and patient and family verbalized understanding. Follow up appointment(s) reviewed with patient and all attempts made to schedule within 7-10 days of discharge.  Patient given discharge paperwork, discharge instructions, and appointment times.  Patient discharged to home with family via private car.  Taken to lobby via wheelchair.

## 2024-03-24 NOTE — DISCHARGE SUMMARY
daily   Diuretic: pt not d/c with diuretic   Coumadin: pt not d/c with diuretic     Discharge Medications:  Discharge Medication List as of 3/24/2024 11:17 AM             Details   polyethylene glycol (GLYCOLAX) 17 g packet Take 1 packet by mouth daily as needed for Constipation, Disp-527 g, R-0Normal      traMADol (ULTRAM) 50 MG tablet Take 1 tablet by mouth every 6 hours as needed for Pain for up to 7 days. Max Daily Amount: 200 mg, Disp-24 tablet, R-0Normal      gabapentin (NEURONTIN) 100 MG capsule Take 3 capsules by mouth at bedtime for 7 days., Disp-21 capsule, R-0Normal      metoprolol succinate (TOPROL XL) 50 MG extended release tablet Take 1 tablet by mouth daily, Disp-30 tablet, R-3Normal      ferrous sulfate (IRON 325) 325 (65 Fe) MG tablet Take 1 tablet by mouth daily (with breakfast), Disp-30 tablet, R-0Normal                Details   atorvastatin (LIPITOR) 40 MG tablet Take 1 tablet by mouth nightly, Disp-30 tablet, R-3Normal                Details   topiramate (TOPAMAX) 50 MG tablet Take 1 tablet by mouth daily, Disp-90 tablet, R-0Normal      glipiZIDE (GLUCOTROL XL) 10 MG extended release tablet TAKE 1 TABLET BY MOUTH DAILY, Disp-30 tablet, R-3Normal      FARXIGA 10 MG tablet TAKE ONE TABLET BY MOUTH EVERY MORNING, Disp-30 tablet, R-3Normal      Testosterone (ANDROGEL) 20.25 MG/ACT (1.62%) GEL gel Place 4 actuation onto the skin daily for 90 days. Max Daily Amount: 81 mg, Disp-2 each, R-3Normal      metFORMIN (GLUCOPHAGE-XR) 500 MG extended release tablet TAKE TWO TABLETS BY MOUTH DAILY WITH BREAKFAST, Disp-60 tablet, R-5Normal      fenofibrate (TRIGLIDE) 160 MG tablet Take 1 tablet by mouth daily, Disp-90 tablet, R-3Normal      [employer] LANCETS MISC 3 TIMES DAILY Starting Mon 7/22/2019, Disp-100 each, R-3, Normal      blood glucose test strips ([employer] BLOOD GLUCOSE TEST) strip 3 TIMES DAILY Starting Mon 7/22/2019, Disp-100 each, R-3, NormalAs needed.      Multiple Vitamins-Minerals (CENTRUM SILVER PO)

## 2024-03-24 NOTE — CARE COORDINATION
Discharge Planning:     ([facility]) rec'd a call from Patient's Nurse, Lydia advising the wife had selected a Home Health Care (HHC) agency- [facility] ([facility]).     CM called Komal [phone] with [facility] and [facility] advising CM had a new referral for her and asked for a phone call back.     CM faxed the referral as well and it went through successfully.      CM rec'd a call back from [employer] advising that they are out of network with Patient's insurance as he has a commercial [employer] and they only take [employer].     [facility] called Patient's Nurse back who advised family also okay with [facility] or [facility].     CM called Kellen with [facility] [phone] and provided her with the referral. She ACCEPTED and advised they will start care tomorrow.     Electronically signed by KATERYNA Bradshaw on 3/24/2024 at 11:18 AM

## 2024-03-24 NOTE — DISCHARGE INSTR - OTHER ORDERS
Operative Note        Patient: Arash Valdes  YOB: 1965  MRN: [id number]     Date of Procedure: 3/20/2024     Pre-Op Diagnosis Codes:     * Coronary artery disease [I25.10]     Post-Op Diagnosis: Same       Procedure(s):  CABG X 3  with LIMA and SVG, TCPB, ROSA, VERIFICATION OF BYPASS GRAFTS FLOW WITH MEDISTEM, EVH OF _RIGHT SAPHENOUS VEIN, APPLICATION OF PLATELET GEL, STERNAL PLATING  LIMA to distal LAD  SVG to RI  SVG to PDA  EVH     Surgeon(s):  Debbie Mccoy MD

## 2024-03-24 NOTE — CARE COORDINATION
Case Management -  Discharge Note      Patient Name: Arash Valdes                   YOB: 1965            Readmission Risk (Low < 19, Mod (19-27), High > 27): Readmission Risk Score: 7.4    Current PCP: Alicia Glover MD      Home Care Information:   Is patient resuming current home health care services: No    Home Care Agency:   [facility]  [address],   [place] [address]  Phone: [phone]  Fax: [phone]              Services: Skilled Nursing  Home Health Order Obtained: Yes    Home health agency notified of discharge.       Financial    Payor: AETNA / Plan: AETNA / Product Type: *No Product type* /     Pharmacy:  Potential assistance Purchasing Medications: No  Meds-to-Beds request:        [facility] [id number] - [place], [place] - [address] - P [phone] - F [phone]  [address] [address]  Phone: [phone] Fax: [phone]      Notes:    Additional Case Management Notes: Wife to transport home. All CM needs met.       Electronically signed by KATERYNA Bradshaw on 3/24/2024 at 11:19 AM

## 2024-03-24 NOTE — DISCHARGE INSTRUCTIONS
too soft, stop taking the stool softener.    Take your medications exactly how prescribed. Don’t increase decrease, or stop your medication without your doctor’s approval.    Tell your doctor if you get any symptoms or side effects such as an upset stomach, vomiting, diarrhea, or skin rash.    Do not take any over the counter medication or herbal supplements without consulting your physician.    Keep a list of all your medication names, dosages, and instructions.      SHAKILA Hose (White Stockings)  These are used to prevent swelling and usually are worn for 2-4 weeks. They should be worn during the day and taken off at bedtime.     Someone other than the patient will need to put on and take off the hose. It is too much pulling and tugging for the patient. Expect them to be difficult to put on and they should feel snug.    Wash them by hand to keep their elasticity.    Incentive Spirometer:  Continue to use your lung exerciser at least 3 times per day for the next 4 weeks. Support your chest and cough each time you complete the 10 exercises. This will help keep your lungs clear and prevent pneumonia.    Awareness of Heart Beating:  You may feel your heart is beating stronger or that your heart is beating in your neck and ears. Don’t worry this is common especially at bedtime.    Smoking:  NEVER SMOKE AGAIN! Absolutely no tobacco products. Do not allow others to smoke around you. Second hand smoke can be just as bad. Smoking negatively affects the healing process, circulation, and increases your risk for blood clots.  The American Cancer Society has a free program to help people quit smoking. Call 1-543.395.3054.    What problems do you need to look out for after you leave the hospital?   Call your surgeon if you have any of these symptoms.  [phone]  If you have sudden, severe shortness of breath or shortness of breath while resting.  Pain, tenderness, warmth or redness in your calf.  For redness, warmth,

## 2024-03-26 ENCOUNTER — TELEPHONE (OUTPATIENT)
Dept: FAMILY MEDICINE CLINIC | Age: 59
End: 2024-03-26

## 2024-03-26 NOTE — TELEPHONE ENCOUNTER
Care Transitions Initial Follow Up Call    Outreach made within 2 business days of discharge: Yes    Patient: Arash Valdes Patient : 1965   MRN: [id number]  Reason for Admission: There are no discharge diagnoses documented for the most recent discharge.  Discharge Date: 3/24/24       Spoke with: Arash    Discharge department/facility: [facility]    TCM Interactive Patient Contact:  Was patient able to fill all prescriptions: Yes  Was patient instructed to bring all medications to the follow-up visit: Yes  Is patient taking all medications as directed in the discharge summary? Yes  Does patient understand their discharge instructions: Yes  Does patient have questions or concerns that need addressed prior to 7-14 day follow up office visit: no    Scheduled appointment with PCP within 7-14 days    Follow Up  Future Appointments   Date Time Provider Department Center   2024 10:00 AM Ella Koo PA FF CT SURG [facility]   2024 11:00 AM Chandrika Yan APRN - CNP FF Cardio [facility]   2024  4:20 PM Marlene Barbour MD [place] Endo [facility]   7/10/2024  4:45 PM Alicia Glover MD [facility] Jessica Rodriguez MA

## 2024-03-29 DIAGNOSIS — E78.1 HYPERTRIGLYCERIDEMIA: ICD-10-CM

## 2024-03-29 DIAGNOSIS — E11.9 TYPE 2 DIABETES MELLITUS WITHOUT COMPLICATION, WITHOUT LONG-TERM CURRENT USE OF INSULIN (HCC): ICD-10-CM

## 2024-03-29 DIAGNOSIS — E11.9 CONTROLLED TYPE 2 DIABETES MELLITUS WITHOUT COMPLICATION, WITHOUT LONG-TERM CURRENT USE OF INSULIN (HCC): ICD-10-CM

## 2024-03-29 RX ORDER — TOPIRAMATE 50 MG/1
50 TABLET, FILM COATED ORAL DAILY
Qty: 30 TABLET | Refills: 0 | Status: SHIPPED | OUTPATIENT
Start: 2024-03-29

## 2024-03-29 RX ORDER — ATORVASTATIN CALCIUM 20 MG/1
TABLET, FILM COATED ORAL
Qty: 30 TABLET | OUTPATIENT
Start: 2024-03-29

## 2024-03-29 RX ORDER — METFORMIN HYDROCHLORIDE 500 MG/1
1000 TABLET, EXTENDED RELEASE ORAL
Qty: 60 TABLET | Refills: 0 | Status: SHIPPED | OUTPATIENT
Start: 2024-03-29

## 2024-03-29 NOTE — TELEPHONE ENCOUNTER
Medication:   Requested Prescriptions     Pending Prescriptions Disp Refills    topiramate (TOPAMAX) 50 MG tablet [Pharmacy Med Name: TOPIRAMATE 50 MG TABLET] 30 tablet      Sig: TAKE 1 TABLET BY MOUTH DAILY        Last Filled:  12/19/2023 #90 w/o RF    Patient Phone Number: [phone] (home)     Last appt: 1/11/2024   Next appt: 4/4/2024    Last OARRS:       7/28/2022     4:29 PM   RX Monitoring   Periodic Controlled Substance Monitoring No signs of potential drug abuse or diversion identified.

## 2024-03-29 NOTE — TELEPHONE ENCOUNTER
Medication:   Requested Prescriptions     Pending Prescriptions Disp Refills    atorvastatin (LIPITOR) 20 MG tablet [Pharmacy Med Name: ATORVASTATIN 20 MG TABLET] 30 tablet      Sig: TAKE ONE TABLET BY MOUTH ONCE NIGHTLY    metFORMIN (GLUCOPHAGE-XR) 500 MG extended release tablet [Pharmacy Med Name: METFORMIN HCL  MG TABLET] 60 tablet 5     Sig: TAKE 2 TABLETS BY MOUTH DAILY WITH BREAKFAST        Last Filled:  9/13/223    Patient Phone Number: [phone] (home)     Last appt: 1/11/2024   Next appt: 3/29/2024    Last OARRS:       7/28/2022     4:29 PM   RX Monitoring   Periodic Controlled Substance Monitoring No signs of potential drug abuse or diversion identified.

## 2024-04-02 ENCOUNTER — OFFICE VISIT (OUTPATIENT)
Age: 59
End: 2024-04-02

## 2024-04-02 VITALS
OXYGEN SATURATION: 99 % | SYSTOLIC BLOOD PRESSURE: 96 MMHG | BODY MASS INDEX: 30.24 KG/M2 | WEIGHT: 216 LBS | HEART RATE: 80 BPM | DIASTOLIC BLOOD PRESSURE: 60 MMHG | HEIGHT: 71 IN

## 2024-04-02 DIAGNOSIS — Z95.1 S/P CABG X 3: Primary | ICD-10-CM

## 2024-04-02 DIAGNOSIS — Z09 FOLLOW-UP EXAMINATION FOLLOWING SURGERY: ICD-10-CM

## 2024-04-02 PROCEDURE — 99024 POSTOP FOLLOW-UP VISIT: CPT | Performed by: PHYSICIAN ASSISTANT

## 2024-04-02 RX ORDER — METOPROLOL SUCCINATE 50 MG/1
25 TABLET, EXTENDED RELEASE ORAL DAILY
Qty: 30 TABLET | Refills: 3 | Status: SHIPPED | OUTPATIENT
Start: 2024-04-02

## 2024-04-02 NOTE — PATIENT INSTRUCTIONS
- plan to see us in 4 weeks    - DO NOT LIFT, PUSH, OR PULL ANYTHING OVER 5 POUNDS until 5/20/24   - no driving for 3 more weeks  - continue cardiac diet and diabetic diet   - Wash incisions daily with soap and water, dry well. Do not wash your incisions after you have cleansed other parts of your body    - continue using your incentive spirometer daily   - walk daily     - continue medication regimen  - cut your Metoprolol in half, take 0.5 tablets (25mg) nightly  - continue checking your blood pressure every morning, call us if it starts to run high

## 2024-04-02 NOTE — PROGRESS NOTES
Progress Note    S/P CABG X 3 with LIMA and SVG, TCPB, ROSA, VERIFICATION OF BYPASS GRAFTS FLOW WITH MEDISTEM, EVH OF _RIGHT SAPHENOUS VEIN, APPLICATION OF PLATELET GEL, STERNAL PLATING on 3/20/24 per Dr Mccoy.    3/24/24 discharged home with [facility].     Seen today for 1st postop visit with us. Complains of low blood pressure at home. Today in the office 96/60. Has been running  systolic at home. Some symptoms of dizziness and orthostatic hypotension. Had 1 episode of standing up too fast and getting light headed, sliding into the wall and down to the ground. Did not hit his head. No chest or incision pain after the fact. Sternum stable. Will decrease metoprolol to 25mg nightly. Denies CP, SOB, leg edema, palpitations, light headedness, N/V/D. Pain controlled. Incisions healing well. Chest tube sites healing well. Eating and drinking adequately. Bowel movements back to normal. Ambulating everyday. Using incentive spirometer every day. Adhering to sternal precautions and using heart pillow. Sleeping well. Medications reviewed. All questions answered.       Vital Signs:                                                 BP 96/60 (Site: Left Upper Arm, Position: Sitting, Cuff Size: Medium Adult)   Pulse 80   Ht 1.802 m (5' 10.95\")   Wt 98 kg (216 lb)   SpO2 99%   BMI 30.17 kg/m²        Weights:   Admission WT: 98.7 kg  Pre-Op WT: 98.7 kg  Discharge WT: 98.1 kg  Today: 98 kg    CV: reg, wound c/d/i, sternum stable  Pulm: clear, decreased at bases  Abd: soft  Ext: warm. No edema. R LE saph incision c/d/i.     Medications:  Prior to Admission medications    Medication Sig Start Date End Date Taking? Authorizing Provider   topiramate (TOPAMAX) 50 MG tablet TAKE 1 TABLET BY MOUTH DAILY 3/29/24  Yes Jamia Grossman MD   metFORMIN (GLUCOPHAGE-XR) 500 MG extended release tablet TAKE 2 TABLETS BY MOUTH DAILY WITH BREAKFAST 3/29/24  Yes Jamia Grossman MD   atorvastatin (LIPITOR) 40 MG tablet Take 1

## 2024-04-03 DIAGNOSIS — E11.9 TYPE 2 DIABETES MELLITUS WITHOUT COMPLICATION, WITHOUT LONG-TERM CURRENT USE OF INSULIN (HCC): ICD-10-CM

## 2024-04-04 ENCOUNTER — OFFICE VISIT (OUTPATIENT)
Dept: FAMILY MEDICINE CLINIC | Age: 59
End: 2024-04-04

## 2024-04-04 VITALS
WEIGHT: 212 LBS | TEMPERATURE: 96.8 F | OXYGEN SATURATION: 98 % | BODY MASS INDEX: 29.61 KG/M2 | DIASTOLIC BLOOD PRESSURE: 68 MMHG | HEART RATE: 89 BPM | RESPIRATION RATE: 16 BRPM | SYSTOLIC BLOOD PRESSURE: 110 MMHG

## 2024-04-04 DIAGNOSIS — Z95.1 S/P CABG X 3: ICD-10-CM

## 2024-04-04 DIAGNOSIS — I10 ESSENTIAL HYPERTENSION: ICD-10-CM

## 2024-04-04 DIAGNOSIS — I25.118 CORONARY ARTERY DISEASE OF NATIVE ARTERY OF NATIVE HEART WITH STABLE ANGINA PECTORIS (HCC): ICD-10-CM

## 2024-04-04 DIAGNOSIS — E11.9 TYPE 2 DIABETES MELLITUS WITHOUT COMPLICATION, WITHOUT LONG-TERM CURRENT USE OF INSULIN (HCC): ICD-10-CM

## 2024-04-04 DIAGNOSIS — E78.1 HYPERTRIGLYCERIDEMIA: ICD-10-CM

## 2024-04-04 DIAGNOSIS — Z09 HOSPITAL DISCHARGE FOLLOW-UP: Primary | ICD-10-CM

## 2024-04-04 RX ORDER — TOPIRAMATE 50 MG/1
50 TABLET, FILM COATED ORAL DAILY
Qty: 30 TABLET | Refills: 5 | Status: SHIPPED | OUTPATIENT
Start: 2024-04-04

## 2024-04-04 RX ORDER — METFORMIN HYDROCHLORIDE 500 MG/1
1000 TABLET, EXTENDED RELEASE ORAL
Qty: 60 TABLET | Refills: 5 | Status: SHIPPED | OUTPATIENT
Start: 2024-04-04

## 2024-04-04 NOTE — PROGRESS NOTES
sulfate (IRON 325) 325 (65 Fe) MG tablet Take 1 tablet by mouth daily (with breakfast) 30 tablet 0    glipiZIDE (GLUCOTROL XL) 10 MG extended release tablet TAKE 1 TABLET BY MOUTH DAILY 30 tablet 3    FARXIGA 10 MG tablet TAKE ONE TABLET BY MOUTH EVERY MORNING 30 tablet 3    Testosterone (ANDROGEL) 20.25 MG/ACT (1.62%) GEL gel Place 4 actuation onto the skin daily for 90 days. Max Daily Amount: 81 mg 2 each 3    fenofibrate (TRIGLIDE) 160 MG tablet Take 1 tablet by mouth daily 90 tablet 3    [employer] LANCETS MISC 1 each by Does not apply route 3 times daily 100 each 3    blood glucose test strips ([employer] BLOOD GLUCOSE TEST) strip 1 each by In Vitro route 3 times daily As needed. 100 each 3    Multiple Vitamins-Minerals (CENTRUM SILVER PO) Take  by mouth.      vitamin D (CHOLECALCIFEROL) 1000 UNIT TABS tablet Take 1 tablet by mouth daily      aspirin 81 MG tablet Take 1 tablet by mouth daily          Medications patient taking as of now reconciled against medications ordered at time of hospital discharge: Yes    A comprehensive review of systems was negative except for what was noted in the HPI.    Objective:    /68   Pulse 89   Temp 96.8 °F (36 °C) (Temporal)   Resp 16   Wt 96.2 kg (212 lb)   SpO2 98%   BMI 29.61 kg/m²   General Appearance: alert and oriented to person, place and time, well developed and well- nourished, in no acute distress  Skin: warm and dry, no rash or erythema  Head: normocephalic and atraumatic  Eyes: pupils equal, round, and reactive to light, extraocular eye movements intact, conjunctivae normal  Pulmonary/Chest: clear to auscultation bilaterally- no wheezes, rales or rhonchi, normal air movement, no respiratory distress  Cardiovascular: normal rate, regular rhythm, normal S1 and S2, no murmurs, rubs, clicks, or gallops, distal pulses intact, no carotid bruits  Extremities: no cyanosis, clubbing or edema      An electronic signature was used to authenticate this note.  --Alicia

## 2024-04-04 NOTE — TELEPHONE ENCOUNTER
Medication:   Requested Prescriptions     Pending Prescriptions Disp Refills    topiramate (TOPAMAX) 50 MG tablet [Pharmacy Med Name: TOPIRAMATE 50 MG TABLET] 30 tablet 0     Sig: TAKE 1 TABLET BY MOUTH DAILY        Last Filled:      Patient Phone Number: [phone] (home)     Last appt: 1/11/2024   Next appt:   Last OARRS:       7/28/2022     4:29 PM   RX Monitoring   Periodic Controlled Substance Monitoring No signs of potential drug abuse or diversion identified.

## 2024-04-04 NOTE — TELEPHONE ENCOUNTER
Medication:   Requested Prescriptions     Pending Prescriptions Disp Refills    metFORMIN (GLUCOPHAGE-XR) 500 MG extended release tablet [Pharmacy Med Name: METFORMIN HCL  MG TABLET] 60 tablet 0     Sig: TAKE 2 TABLETS BY MOUTH DAILY WITH BREAKFAST        Last Filled:      Patient Phone Number: [phone] (home)     Last appt: 1/11/2024   Next appt:  Last OARRS:       7/28/2022     4:29 PM   RX Monitoring   Periodic Controlled Substance Monitoring No signs of potential drug abuse or diversion identified.

## 2024-04-07 DIAGNOSIS — E11.9 TYPE 2 DIABETES MELLITUS WITHOUT COMPLICATION, WITHOUT LONG-TERM CURRENT USE OF INSULIN (HCC): ICD-10-CM

## 2024-04-08 DIAGNOSIS — E11.9 TYPE 2 DIABETES MELLITUS WITHOUT COMPLICATION, WITHOUT LONG-TERM CURRENT USE OF INSULIN (HCC): ICD-10-CM

## 2024-04-08 RX ORDER — TOPIRAMATE 50 MG/1
50 TABLET, FILM COATED ORAL DAILY
Qty: 30 TABLET | Refills: 5 | OUTPATIENT
Start: 2024-04-08

## 2024-04-08 RX ORDER — METFORMIN HYDROCHLORIDE 500 MG/1
1000 TABLET, EXTENDED RELEASE ORAL
Qty: 60 TABLET | Refills: 5 | OUTPATIENT
Start: 2024-04-08

## 2024-04-08 RX ORDER — DAPAGLIFLOZIN 10 MG/1
10 TABLET, FILM COATED ORAL EVERY MORNING
Qty: 30 TABLET | Refills: 2 | Status: SHIPPED | OUTPATIENT
Start: 2024-04-08

## 2024-04-08 NOTE — TELEPHONE ENCOUNTER
REFUSED PRESCRIPTION- DUPLICATE REQUEST  recently filled (advised patient to contact pharmacy)   E-Prescribing Status: Receipt confirmed by pharmacy (4/4/2024 10:39 AM EDT)       Medication:   Requested Prescriptions     Pending Prescriptions Disp Refills    metFORMIN (GLUCOPHAGE-XR) 500 MG extended release tablet [Pharmacy Med Name: METFORMIN HCL  MG TABLET] 60 tablet 5     Sig: TAKE 2 TABLETS BY MOUTH DAILY WITH BREAKFAST       Last Filled:  4/4/2024 #60 w/5 RF    Patient Phone Number: [phone] (home)     Last appt: 4/4/2024   Next appt: 4/7/2024    Last Labs DM:   Lab Results   Component Value Date/Time    LABA1C 5.4 03/06/2024 09:15 AM

## 2024-04-08 NOTE — TELEPHONE ENCOUNTER
Medication:   Requested Prescriptions      No prescriptions requested or ordered in this encounter        Last Filled:      Patient Phone Number: [phone] (home)     Last appt: 4/4/2024   Next appt: 7/10/2024    Last OARRS:       7/28/2022     4:29 PM   RX Monitoring   Periodic Controlled Substance Monitoring No signs of potential drug abuse or diversion identified.

## 2024-04-08 NOTE — TELEPHONE ENCOUNTER
REFUSED PRESCRIPTION- DUPLICATE REQUEST  recently filled (advised patient to contact pharmacy)   E-Prescribing Status: Receipt confirmed by pharmacy (4/4/2024 10:39 AM EDT)     Medication:   Requested Prescriptions     Pending Prescriptions Disp Refills    topiramate (TOPAMAX) 50 MG tablet [Pharmacy Med Name: TOPIRAMATE 50 MG TABLET] 30 tablet 5     Sig: TAKE 1 TABLET BY MOUTH DAILY        Last Filled:  4/4/24     Patient Phone Number: [phone] (home)     Last appt: 4/4/2024   Next appt: 4/8/2024    Last OARRS:       7/28/2022     4:29 PM   RX Monitoring   Periodic Controlled Substance Monitoring No signs of potential drug abuse or diversion identified.

## 2024-04-15 ENCOUNTER — HOSPITAL ENCOUNTER (OUTPATIENT)
Age: 59
Discharge: HOME | End: 2024-04-15
Payer: COMMERCIAL

## 2024-04-15 ENCOUNTER — TELEPHONE (OUTPATIENT)
Age: 59
End: 2024-04-15

## 2024-04-15 ENCOUNTER — HOSPITAL ENCOUNTER (OUTPATIENT)
Dept: GENERAL RADIOLOGY | Age: 59
Discharge: HOME | End: 2024-04-15
Payer: COMMERCIAL

## 2024-04-15 DIAGNOSIS — R07.9 CHEST PAIN OF UNCERTAIN ETIOLOGY: Primary | ICD-10-CM

## 2024-04-15 DIAGNOSIS — R07.9 CHEST PAIN OF UNCERTAIN ETIOLOGY: ICD-10-CM

## 2024-04-15 PROCEDURE — 71046 X-RAY EXAM CHEST 2 VIEWS: CPT

## 2024-04-15 NOTE — TELEPHONE ENCOUNTER
CXR completed. Small bilateral pleural effusions and Mild atelectasis bilaterally. No evidence of pneumothorax. Reached out to Juancho ALBRECHT who has reviewed the CXR. Pt is to increase use of his IS and IP. Pt has gastric air which can cause the discomfort.   Spoke with pt who states he feels better. Instructed to reduce the Ibuprofen to 200-400 mg alternating with Tylenol every 6 hrs prn pain management needs. He is to increase use of IS and IP. Verbalized understanding. Continue to monitor and bring in for follow up sooner if necessary. Agrees with plan.

## 2024-04-15 NOTE — TELEPHONE ENCOUNTER
S/p CABG 3/20/24. Last seen on 4/2/24 and doing very well. Wife called this am to report that yesterday pt developed sudden onset of sharp pain which worsens with deep inspiration, movement. Has a dry, non productive cough. Denies fevers. Has been using the internal percussion apparatus but not the incentive spirometer. No increased shortness of breath. Unable to measure ox sat.   Verified with SHAI Kowalski that pt may take Ibuprofen 600 mg x 1 and obtain a stat CXR for evaluation. Order placed in Epic. Pt wife aware.

## 2024-04-16 ENCOUNTER — TELEPHONE (OUTPATIENT)
Age: 59
End: 2024-04-16

## 2024-04-16 RX ORDER — GABAPENTIN 100 MG/1
100 CAPSULE ORAL 3 TIMES DAILY
Qty: 30 CAPSULE | Refills: 0 | Status: SHIPPED | OUTPATIENT
Start: 2024-04-16 | End: 2024-04-26

## 2024-04-16 NOTE — TELEPHONE ENCOUNTER
Pt called to report that he continues to have post op pain. States the Ibuprofen helps but Tylenol \"not so much\". States when he uses his IS it causes him to cough (non productive) and have increased pain.    Reviewed pt status and CXR with Dr. Mccoy. Pt cleared to increase the Ibuprofen to 800 mg every 8 hrs and reorder Gabapentin 100 mg three times daily prn.   Pt instructed on above directions. He is to alternate the medications thus giving him something every 4 hrs. He is to take the Ibuprofen around the clock regardless. Verbalized understanding and agrees with plan. Will call back with status. Rx sent to pharm on file.

## 2024-04-18 ENCOUNTER — TELEPHONE (OUTPATIENT)
Age: 59
End: 2024-04-18

## 2024-04-18 NOTE — TELEPHONE ENCOUNTER
Pt called to report that he has not had much improvement since yesterday when he started Gabapentin every 8 hrs and Ibuprofen 800 mg every 8 however he rates his pain 4/10 on exertion and 1/10 at rest. Instructed to continue to take the meds as directed. IS volume is 1000. Encouraged to intentionally try to inhale deeper. States cough ensues when using IS. Encouraged pt to cough and breathe deeply. CXR reviewed by Dr. Berger today. No additional interventions. Discussed with him the side effects of medications, potential for depression, need for increased activity. Verbalized understanding. Spoke with pt wife as well. Pt is recovering as expected. Continue to monitor. Of note, states that his pre operative headaches due to unknown etiology have resolved since surgery.

## 2024-04-19 ENCOUNTER — TELEPHONE (OUTPATIENT)
Age: 59
End: 2024-04-19

## 2024-04-19 NOTE — TELEPHONE ENCOUNTER
Pt calling to discuss his status today. States he is feeling better. His IS is [id number] and he is using the IS more often. No fevers. Pain remains 4/10 when walking. Cough improved. Will continue to work on po intake, pain management and increasing activity. Remain supportive.

## 2024-04-22 ENCOUNTER — HOSPITAL ENCOUNTER (OUTPATIENT)
Age: 59
Discharge: HOME | End: 2024-04-22
Payer: COMMERCIAL

## 2024-04-22 ENCOUNTER — OFFICE VISIT (OUTPATIENT)
Dept: CARDIOLOGY CLINIC | Age: 59
End: 2024-04-22
Payer: COMMERCIAL

## 2024-04-22 ENCOUNTER — HOSPITAL ENCOUNTER (OUTPATIENT)
Dept: GENERAL RADIOLOGY | Age: 59
Discharge: HOME | End: 2024-04-22
Payer: COMMERCIAL

## 2024-04-22 VITALS
OXYGEN SATURATION: 99 % | WEIGHT: 206 LBS | DIASTOLIC BLOOD PRESSURE: 56 MMHG | HEIGHT: 70 IN | SYSTOLIC BLOOD PRESSURE: 90 MMHG | HEART RATE: 92 BPM | BODY MASS INDEX: 29.49 KG/M2

## 2024-04-22 DIAGNOSIS — I10 PRIMARY HYPERTENSION: ICD-10-CM

## 2024-04-22 DIAGNOSIS — E78.2 MIXED HYPERLIPIDEMIA: ICD-10-CM

## 2024-04-22 DIAGNOSIS — I25.10 CORONARY ARTERY DISEASE INVOLVING NATIVE CORONARY ARTERY OF NATIVE HEART WITHOUT ANGINA PECTORIS: Primary | ICD-10-CM

## 2024-04-22 DIAGNOSIS — I25.10 CORONARY ARTERY DISEASE INVOLVING NATIVE CORONARY ARTERY OF NATIVE HEART WITHOUT ANGINA PECTORIS: ICD-10-CM

## 2024-04-22 PROCEDURE — 3074F SYST BP LT 130 MM HG: CPT | Performed by: NURSE PRACTITIONER

## 2024-04-22 PROCEDURE — 3078F DIAST BP <80 MM HG: CPT | Performed by: NURSE PRACTITIONER

## 2024-04-22 PROCEDURE — 99214 OFFICE O/P EST MOD 30 MIN: CPT | Performed by: NURSE PRACTITIONER

## 2024-04-22 PROCEDURE — 71046 X-RAY EXAM CHEST 2 VIEWS: CPT

## 2024-04-22 RX ORDER — IBUPROFEN 800 MG/1
800 TABLET ORAL EVERY 8 HOURS PRN
COMMUNITY

## 2024-04-22 RX ORDER — GABAPENTIN 100 MG/1
CAPSULE ORAL
Qty: 30 CAPSULE | Refills: 0 | OUTPATIENT
Start: 2024-04-22

## 2024-04-22 NOTE — TELEPHONE ENCOUNTER
Spoke with pt. Was seen by cardiology today. States feels better than last week. Still has dry cough but is able to get his IS up to 1500 more often. States pain is improved and he is being more active. Has appt next week. Continue to monitor.

## 2024-04-22 NOTE — PROGRESS NOTES
[facility]     Outpatient Follow Up Note    CHIEF COMPLAINT / HPI: Hospital Follow Up secondary to status post CABG    Hospital record has been reviewed  Hospital Course progressed as follows per discharge summary: 3/20 - 3/24/24  ~elective procedure : c/o BO ; cardiac mayda score d/t strong FH     58 y.o. male with cardiac risk factors of hypertension, hyperlipidemia, diabetes mellitus and strong family history of premature coronary disease   ~he was found to have elevated calcium score. Cardiac CT was ordered as part of screening total because of patient's history of premature coronary disease.   He denies any history of angina chest pain shortness of breath dyspnea on exertion symptoms of congestive heart failure palpitations TIA or CVA. He does have diabetes but this has been very well-controlled according to the patient.     Hospital Course:  The patient underwent CABG x3 on 3/20/24 .  The patient's post-operative course was uneventful. Patient was able to ambulate without difficulty and tolerate a regular diet. The patient was discharged on 3/24/2024.        Arash Valdes is 58 y.o. male who presents today for a routine follow up after a recent hospitalization related to the above mentioned issues.  Subjective:   Since the time of discharge, the patient admits their symptoms have improved.     He denies significant chest pain. He has discomfort on his Rt side at the same time as his cough. There is no SOB/BO but feels out of breath after walking.  He denies swelling. His wt has gone down ~ 11# / 4 weeks at 200# today. The patient is not experiencing palpitations.   His home BP runs ~ 106/65 yesterday ; running b/w 96/67 to 116/70    He had lower back pain last week suspected that the nerve block had worn off. Gabapentin was reorder and ibuprofen started.     These symptoms are improving over the last many days.   With regard to medication therapy the patient has been compliant with prescribed

## 2024-04-22 NOTE — PATIENT INSTRUCTIONS
Chest x-ray today    Decrease metoprolol to 1/4 tablet = 12.5 mg once daily     Appt with Ella as scheduled    Appt with us in 4-6 weeks

## 2024-04-23 ENCOUNTER — TELEPHONE (OUTPATIENT)
Dept: ENDOCRINOLOGY | Age: 59
End: 2024-04-23

## 2024-04-23 DIAGNOSIS — N46.9 INFERTILITY MALE: ICD-10-CM

## 2024-04-23 DIAGNOSIS — E29.1 HYPOGONADISM MALE: ICD-10-CM

## 2024-04-23 RX ORDER — TESTOSTERONE 16.2 MG/G
GEL TRANSDERMAL
Qty: 2 EACH | Refills: 3 | Status: CANCELLED | OUTPATIENT
Start: 2024-04-23

## 2024-04-23 NOTE — TELEPHONE ENCOUNTER
Per Dr Barbour patient canceled last OV and will have to wait to be seen for prescription. We will initiate a PA at that time. Thanks!

## 2024-04-29 ENCOUNTER — TELEPHONE (OUTPATIENT)
Age: 59
End: 2024-04-29

## 2024-04-30 ENCOUNTER — OFFICE VISIT (OUTPATIENT)
Age: 59
End: 2024-04-30

## 2024-04-30 VITALS
TEMPERATURE: 96.8 F | HEIGHT: 70 IN | BODY MASS INDEX: 28.77 KG/M2 | SYSTOLIC BLOOD PRESSURE: 108 MMHG | OXYGEN SATURATION: 98 % | DIASTOLIC BLOOD PRESSURE: 62 MMHG | HEART RATE: 92 BPM | WEIGHT: 201 LBS

## 2024-04-30 DIAGNOSIS — Z95.1 S/P CABG X 3: Primary | ICD-10-CM

## 2024-04-30 DIAGNOSIS — Z09 FOLLOW-UP EXAMINATION FOLLOWING SURGERY: ICD-10-CM

## 2024-04-30 PROCEDURE — 99024 POSTOP FOLLOW-UP VISIT: CPT | Performed by: PHYSICIAN ASSISTANT

## 2024-04-30 NOTE — PATIENT INSTRUCTIONS
- call if any questions or concerns    - DO NOT LIFT, PUSH, OR PULL ANYTHING OVER 5 POUNDS until 5/20/24   - continue sleeping on your back for another month  - continue cardiac diet and diabetic diet   - Wash incisions daily with soap and water, dry well. Do not wash your incisions after you have cleansed other parts of your body    - cleared from surgical standpoint for cardiac rehab, referral per Cardiology.     - you may return to work with restrictions 5/6/24, we will write you a note    - continue medication regimen  - you may  a cough suppressant over the counter if needed  - use your incentive spirometer and acapella at least 10 times a day, 10 reps each. Try for 2,000 on IS

## 2024-04-30 NOTE — PROGRESS NOTES
Progress Note    S/P CABG X 3 with LIMA and SVG, TCPB, ROSA, VERIFICATION OF BYPASS GRAFTS FLOW WITH MEDISTEM, EVH OF _RIGHT SAPHENOUS VEIN, APPLICATION OF PLATELET GEL, STERNAL PLATING on 3/20/24 per Dr Mccoy.    3/24/24 discharged home with [facility].     4/2 -- 1st postop visit. Low BP at home, sx of dizziness and orthostatic hypotension. Decreased metoprolol. Continue checking BP.     4/22 -- office visit with Cardiology. Toprol decreased to 12.5mg daily d/t continued low BP. F/u in 4-6 weeks with Dr Terry at [facility].     Seen today for 2nd postop visit with us. Complains of a non-productive cough. Denies CP, SOB, leg edema, palpitations, light headedness, N/V/D, fever, productive cough, HA, postnasal drainage. CXR 2view per cards reviewed, mild R base atelectasis, no pleural effusions. Advised to try over the counter cough suppressant and to start using IS/acapella 8-10 times a day, goal IS of at least 2,000. Pain controlled. Incisions healing well. Chest tube sites healing well. Sternum stable. Eating and drinking adequately. Bowel movements back to normal. Ambulating everyday. Using incentive spirometer every day. Adhering to sternal precautions and using heart pillow. Sleeping well. Medications reviewed. All questions answered.   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 Wants to start Monday, pain controlled, able to drive now.   Will write letter to go back to work with restrictions.   Asked about sleeping on his side, advised to continue sleeping on his back for another month.       Vital Signs:                                                 /62 (Site: Left Upper Arm, Position: Sitting, Cuff Size: Medium Adult)   Pulse 92   Temp 96.8 °F (36 °C) (Temporal)   Ht 1.778 m (5' 10\")   Wt 91.2 kg (201 lb)   SpO2 98%   BMI 28.84 kg/m²        Weights:   Admission WT: 98.7 kg  Pre-Op WT: 98.7 kg  Discharge WT: 98.1 kg  4/2/24: 98 kg  4/22/24: 93.4 kg  Today: 91.2 kg    CV: reg,

## 2024-05-01 ENCOUNTER — TELEPHONE (OUTPATIENT)
Dept: CARDIAC REHAB | Age: 59
End: 2024-05-01

## 2024-05-02 ENCOUNTER — HOSPITAL ENCOUNTER (OUTPATIENT)
Dept: CARDIAC REHAB | Age: 59
Setting detail: THERAPIES SERIES
Discharge: HOME | End: 2024-05-02
Payer: COMMERCIAL

## 2024-05-02 VITALS — BODY MASS INDEX: 29.09 KG/M2 | HEIGHT: 71 IN | HEART RATE: 78 BPM | WEIGHT: 207.8 LBS | RESPIRATION RATE: 16 BRPM

## 2024-05-02 PROCEDURE — 93798 PHYS/QHP OP CAR RHAB W/ECG: CPT

## 2024-05-02 RX ORDER — MULTIVIT WITH MINERALS/LUTEIN
250 TABLET ORAL DAILY
COMMUNITY

## 2024-05-02 ASSESSMENT — PATIENT HEALTH QUESTIONNAIRE - PHQ9
SUM OF ALL RESPONSES TO PHQ9 QUESTIONS 1 & 2: 1
3. TROUBLE FALLING OR STAYING ASLEEP: NOT AT ALL
SUM OF ALL RESPONSES TO PHQ QUESTIONS 1-9: 1
9. THOUGHTS THAT YOU WOULD BE BETTER OFF DEAD, OR OF HURTING YOURSELF: NOT AT ALL
6. FEELING BAD ABOUT YOURSELF - OR THAT YOU ARE A FAILURE OR HAVE LET YOURSELF OR YOUR FAMILY DOWN: NOT AT ALL
4. FEELING TIRED OR HAVING LITTLE ENERGY: NOT AT ALL
1. LITTLE INTEREST OR PLEASURE IN DOING THINGS: SEVERAL DAYS
SUM OF ALL RESPONSES TO PHQ QUESTIONS 1-9: 1
8. MOVING OR SPEAKING SO SLOWLY THAT OTHER PEOPLE COULD HAVE NOTICED. OR THE OPPOSITE, BEING SO FIGETY OR RESTLESS THAT YOU HAVE BEEN MOVING AROUND A LOT MORE THAN USUAL: NOT AT ALL
10. IF YOU CHECKED OFF ANY PROBLEMS, HOW DIFFICULT HAVE THESE PROBLEMS MADE IT FOR YOU TO DO YOUR WORK, TAKE CARE OF THINGS AT HOME, OR GET ALONG WITH OTHER PEOPLE: NOT DIFFICULT AT ALL
7. TROUBLE CONCENTRATING ON THINGS, SUCH AS READING THE NEWSPAPER OR WATCHING TELEVISION: NOT AT ALL
5. POOR APPETITE OR OVEREATING: NOT AT ALL
2. FEELING DOWN, DEPRESSED OR HOPELESS: NOT AT ALL

## 2024-05-02 ASSESSMENT — EJECTION FRACTION: EF_VALUE: 55

## 2024-05-02 NOTE — CARDIO/PULMONARY
[facility] Cardiac Rehabilitation Initial Evaluation    Arash Valdes       1965     [id number]    Share medical information:  Yes Vianca [phone]    Cardiac History    CABG    Physical Assessment     General Appearance   Height:  180 cm (5' 10.87\")  Weight:  94.3 kg (207 lb 12.8 oz)   BMI:  29.2  Skin color:         Cardiovascular Assessment  BP Sitting:     Sitting:     Standing:     Heart rate:   78     Heart sounds:     S1, S2    Respiratory Assessment  Resp rate: 16                  SpO2:     Quality/Effort:   good   Sleep Apnea:  No  CPAP  No  Oxygen  No     Sleeping Habits: good    Edema:  No      Orthopedic/Exercise Limitations:  No    Pain:   Do you have pain?:  yes - Occasional lower back       Fall Risk Assessment     No falls                                Abuse / Neglect  Physical/behavioral signs of abuse/neglect   No    Do you feel safe at home   Yes    Advanced Directives  Patient has Advanced Directives:  No  Patient given Advanced Directive pack:  printed    Vaccinations  Influenza (annual):  No  Pneumonia:  No  Pt here for first session of Cardiac Rehab.Reviewed and discussed insurance benefits, pt v/u.  Reviewed Cardiac Rehab Routine and RPE scale, pt v/u.  Developed individual treatment plan and goals set with patient; pt in agreement with plan and no further questions at this time.  Performed six minute walk test.  Next visit scheduled for  5/6/24 10:00 class.    Virginia Burdick, RN  5/2/2024

## 2024-05-03 DIAGNOSIS — E78.2 ELEVATED TRIGLYCERIDES WITH HIGH CHOLESTEROL: ICD-10-CM

## 2024-05-03 RX ORDER — FENOFIBRATE 160 MG/1
160 TABLET ORAL DAILY
Qty: 30 TABLET | Refills: 5 | Status: SHIPPED | OUTPATIENT
Start: 2024-05-03

## 2024-05-03 NOTE — TELEPHONE ENCOUNTER
Medication:   Requested Prescriptions     Pending Prescriptions Disp Refills    fenofibrate (TRIGLIDE) 160 MG tablet [Pharmacy Med Name: FENOFIBRATE 160 MG TABLET] 30 tablet      Sig: TAKE ONE TABLET BY MOUTH DAILY        Last Filled: 04/14/2023 #90 3rf     Patient Phone Number: [phone] (home)     Last appt: 4/4/2024   Next appt: 7/10/2024    Last OARRS:       7/28/2022     4:29 PM   RX Monitoring   Periodic Controlled Substance Monitoring No signs of potential drug abuse or diversion identified.

## 2024-05-06 ENCOUNTER — HOSPITAL ENCOUNTER (OUTPATIENT)
Dept: CARDIAC REHAB | Age: 59
Setting detail: THERAPIES SERIES
Discharge: HOME | End: 2024-05-06
Payer: COMMERCIAL

## 2024-05-06 PROCEDURE — 93798 PHYS/QHP OP CAR RHAB W/ECG: CPT

## 2024-05-07 LAB
GLUCOSE BLD-MCNC: 134 MG/DL (ref 70–99)
GLUCOSE BLD-MCNC: 98 MG/DL (ref 70–99)
PERFORMED ON: ABNORMAL
PERFORMED ON: NORMAL

## 2024-05-08 ENCOUNTER — HOSPITAL ENCOUNTER (OUTPATIENT)
Dept: CARDIAC REHAB | Age: 59
Setting detail: THERAPIES SERIES
Discharge: HOME | End: 2024-05-08
Payer: COMMERCIAL

## 2024-05-08 PROCEDURE — 93798 PHYS/QHP OP CAR RHAB W/ECG: CPT

## 2024-05-09 LAB
GLUCOSE BLD-MCNC: 105 MG/DL (ref 70–99)
GLUCOSE BLD-MCNC: 107 MG/DL (ref 70–99)
PERFORMED ON: ABNORMAL
PERFORMED ON: ABNORMAL

## 2024-05-10 ENCOUNTER — HOSPITAL ENCOUNTER (OUTPATIENT)
Dept: CARDIAC REHAB | Age: 59
Setting detail: THERAPIES SERIES
Discharge: HOME | End: 2024-05-10
Payer: COMMERCIAL

## 2024-05-10 PROCEDURE — 93798 PHYS/QHP OP CAR RHAB W/ECG: CPT

## 2024-05-13 ENCOUNTER — HOSPITAL ENCOUNTER (OUTPATIENT)
Dept: CARDIAC REHAB | Age: 59
Setting detail: THERAPIES SERIES
Discharge: HOME | End: 2024-05-13
Payer: COMMERCIAL

## 2024-05-13 DIAGNOSIS — E11.9 TYPE 2 DIABETES MELLITUS WITHOUT COMPLICATION, WITHOUT LONG-TERM CURRENT USE OF INSULIN (HCC): ICD-10-CM

## 2024-05-13 LAB
GLUCOSE BLD-MCNC: 103 MG/DL (ref 70–99)
GLUCOSE BLD-MCNC: 168 MG/DL (ref 70–99)
PERFORMED ON: ABNORMAL
PERFORMED ON: ABNORMAL

## 2024-05-13 PROCEDURE — 93798 PHYS/QHP OP CAR RHAB W/ECG: CPT

## 2024-05-13 RX ORDER — GLIPIZIDE 10 MG/1
10 TABLET, FILM COATED, EXTENDED RELEASE ORAL DAILY
Qty: 30 TABLET | Refills: 5 | Status: SHIPPED | OUTPATIENT
Start: 2024-05-13

## 2024-05-13 NOTE — PROGRESS NOTES
[facility]  H+P  Consult  OP Visit  FU Visit   CC/HPI   CC Followup visit for cardiac conditions detailed in assessment and plan below.   Intervention CABG   General Doing well.  No new concerns.     Cardiac Sx -CP, -SOB, -dizziness, -syncope, -edema, -orthopnea, -pnd, -fatigue   HISTORY/ALLERGY/ROS   M/S/S/F Hx Reviewed in Epic and updated as appropriate   ALLERG Lisinopril   ROS Full ROS obtained and negative except as mentioned in HPI   MEDICATIONS   Cardiac medications reviewed in Epic during visit with patient.   PHYSICAL EXAM   Vitals /80 (Site: Left Upper Arm, Position: Sitting, Cuff Size: Medium Adult)   Pulse 80   Ht 1.8 m (5' 10.87\")   Wt 94.3 kg (207 lb 14.3 oz)   SpO2 100%   BMI 29.11 kg/m²    Gen Alert, coop, no distress Heart  RRR, no MRG   Lung CTA-B, unlabored, no DTP Extrem Edema -Grade 0 (0mm)      COMPLIANCE   Discussed and counseled on diet, exercise, weight loss, smoking, alcohol, drugs.  All questions answered.   CODING   SCI (24387) - 30-39 mins spent reviewing hx/tests/consults, performing exam, counseling/educating, ordering meds/tests/procedures, referring/communicating w/PCP/consultants, documenting in EMR, interpreting results, communicating medical information and plan with family.   SCRIBE ATTESTATION   Nurse I, Jeannine Nevarez, RN, am scribing for and in the presence of Jessee Terry MD. 5/13/2024 3:48 PM EDT   Doctor Jeannine Nevarez is working as scribe for and in presence of me and may have prepopulated components of note with my historical intellectual property (IP) under my supervision.  Any additions to this IP performed in my presence and at my direction. Content and accuracy of this note reviewed by me (Jessee Terry MD).   NEW MEDICATIONS   Pt verbalizes understanding of the need for treatment and education provided at today's visit.  Additional education provided in AVS.   ASSESSMENT AND PLAN   *CAD    Date EF Results   Sx     As above   Hx 3/24  CABGx3

## 2024-05-13 NOTE — TELEPHONE ENCOUNTER
Medication:   Requested Prescriptions      No prescriptions requested or ordered in this encounter        Last Filled:      Patient Phone Number: [phone] (home)     Last appt: 4/4/2024   Next appt: 7/10/2024    Last OARRS:       7/28/2022     4:29 PM   RX Monitoring   Periodic Controlled Substance Monitoring No signs of potential drug abuse or diversion identified.

## 2024-05-14 LAB
GLUCOSE BLD-MCNC: 103 MG/DL (ref 70–99)
GLUCOSE BLD-MCNC: 96 MG/DL (ref 70–99)
PERFORMED ON: ABNORMAL
PERFORMED ON: NORMAL

## 2024-05-15 ENCOUNTER — HOSPITAL ENCOUNTER (OUTPATIENT)
Dept: CARDIAC REHAB | Age: 59
Setting detail: THERAPIES SERIES
Discharge: HOME | End: 2024-05-15
Payer: COMMERCIAL

## 2024-05-15 PROCEDURE — 93798 PHYS/QHP OP CAR RHAB W/ECG: CPT

## 2024-05-16 LAB
GLUCOSE BLD-MCNC: 97 MG/DL (ref 70–99)
GLUCOSE BLD-MCNC: 98 MG/DL (ref 70–99)
PERFORMED ON: NORMAL
PERFORMED ON: NORMAL

## 2024-05-17 ENCOUNTER — HOSPITAL ENCOUNTER (OUTPATIENT)
Dept: CARDIAC REHAB | Age: 59
Setting detail: THERAPIES SERIES
End: 2024-05-17
Payer: COMMERCIAL

## 2024-05-17 ENCOUNTER — OFFICE VISIT (OUTPATIENT)
Age: 59
End: 2024-05-17
Payer: COMMERCIAL

## 2024-05-17 ENCOUNTER — HOSPITAL ENCOUNTER (OUTPATIENT)
Dept: CARDIAC REHAB | Age: 59
Setting detail: THERAPIES SERIES
Discharge: HOME | End: 2024-05-17
Payer: COMMERCIAL

## 2024-05-17 VITALS
DIASTOLIC BLOOD PRESSURE: 80 MMHG | BODY MASS INDEX: 29.1 KG/M2 | HEART RATE: 80 BPM | HEIGHT: 71 IN | OXYGEN SATURATION: 100 % | WEIGHT: 207.89 LBS | SYSTOLIC BLOOD PRESSURE: 120 MMHG

## 2024-05-17 DIAGNOSIS — I25.83 CORONARY ARTERY DISEASE DUE TO LIPID RICH PLAQUE: Primary | ICD-10-CM

## 2024-05-17 DIAGNOSIS — Z95.1 HX OF CABG: ICD-10-CM

## 2024-05-17 DIAGNOSIS — I10 ESSENTIAL HYPERTENSION: ICD-10-CM

## 2024-05-17 DIAGNOSIS — I25.10 CORONARY ARTERY DISEASE DUE TO LIPID RICH PLAQUE: Primary | ICD-10-CM

## 2024-05-17 DIAGNOSIS — E78.00 HYPERCHOLESTEROLEMIA: ICD-10-CM

## 2024-05-17 DIAGNOSIS — Z95.1 S/P CABG X 3: ICD-10-CM

## 2024-05-17 PROCEDURE — 3074F SYST BP LT 130 MM HG: CPT | Performed by: INTERNAL MEDICINE

## 2024-05-17 PROCEDURE — 3079F DIAST BP 80-89 MM HG: CPT | Performed by: INTERNAL MEDICINE

## 2024-05-17 PROCEDURE — 99214 OFFICE O/P EST MOD 30 MIN: CPT | Performed by: INTERNAL MEDICINE

## 2024-05-17 PROCEDURE — 93798 PHYS/QHP OP CAR RHAB W/ECG: CPT

## 2024-05-17 RX ORDER — METOPROLOL SUCCINATE 25 MG/1
12.5 TABLET, EXTENDED RELEASE ORAL DAILY
Qty: 90 TABLET | Refills: 3 | Status: SHIPPED | OUTPATIENT
Start: 2024-05-17

## 2024-05-20 ENCOUNTER — HOSPITAL ENCOUNTER (OUTPATIENT)
Dept: CARDIAC REHAB | Age: 59
Setting detail: THERAPIES SERIES
Discharge: HOME | End: 2024-05-20
Payer: COMMERCIAL

## 2024-05-20 ENCOUNTER — APPOINTMENT (OUTPATIENT)
Dept: CARDIAC REHAB | Age: 59
End: 2024-05-20
Payer: COMMERCIAL

## 2024-05-20 PROCEDURE — 93798 PHYS/QHP OP CAR RHAB W/ECG: CPT

## 2024-05-22 ENCOUNTER — APPOINTMENT (OUTPATIENT)
Dept: CARDIAC REHAB | Age: 59
End: 2024-05-22
Payer: COMMERCIAL

## 2024-05-22 ENCOUNTER — HOSPITAL ENCOUNTER (OUTPATIENT)
Dept: CARDIAC REHAB | Age: 59
Setting detail: THERAPIES SERIES
Discharge: HOME | End: 2024-05-22
Payer: COMMERCIAL

## 2024-05-22 PROCEDURE — 93798 PHYS/QHP OP CAR RHAB W/ECG: CPT

## 2024-05-24 ENCOUNTER — APPOINTMENT (OUTPATIENT)
Dept: CARDIAC REHAB | Age: 59
End: 2024-05-24
Payer: COMMERCIAL

## 2024-05-24 ENCOUNTER — HOSPITAL ENCOUNTER (OUTPATIENT)
Dept: CARDIAC REHAB | Age: 59
Setting detail: THERAPIES SERIES
Discharge: HOME | End: 2024-05-24
Payer: COMMERCIAL

## 2024-05-24 PROCEDURE — 93798 PHYS/QHP OP CAR RHAB W/ECG: CPT

## 2024-05-29 ENCOUNTER — APPOINTMENT (OUTPATIENT)
Dept: CARDIAC REHAB | Age: 59
End: 2024-05-29
Payer: COMMERCIAL

## 2024-05-29 ENCOUNTER — HOSPITAL ENCOUNTER (OUTPATIENT)
Dept: CARDIAC REHAB | Age: 59
Setting detail: THERAPIES SERIES
Discharge: HOME | End: 2024-05-29
Payer: COMMERCIAL

## 2024-05-29 PROCEDURE — 93798 PHYS/QHP OP CAR RHAB W/ECG: CPT

## 2024-05-30 ENCOUNTER — HOSPITAL ENCOUNTER (OUTPATIENT)
Age: 59
Discharge: HOME | End: 2024-05-30
Payer: COMMERCIAL

## 2024-05-30 DIAGNOSIS — E78.1 HYPERTRIGLYCERIDEMIA: ICD-10-CM

## 2024-05-30 DIAGNOSIS — N46.9 INFERTILITY MALE: ICD-10-CM

## 2024-05-30 DIAGNOSIS — E55.9 VITAMIN D DEFICIENCY: ICD-10-CM

## 2024-05-30 DIAGNOSIS — E11.9 TYPE 2 DIABETES MELLITUS WITHOUT COMPLICATION, WITHOUT LONG-TERM CURRENT USE OF INSULIN (HCC): ICD-10-CM

## 2024-05-30 DIAGNOSIS — N52.9 ERECTILE DYSFUNCTION, UNSPECIFIED ERECTILE DYSFUNCTION TYPE: ICD-10-CM

## 2024-05-30 DIAGNOSIS — E29.1 HYPOGONADISM MALE: ICD-10-CM

## 2024-05-30 DIAGNOSIS — Z00.00 ANNUAL PHYSICAL EXAM: ICD-10-CM

## 2024-05-30 LAB
25(OH)D3 SERPL-MCNC: 44.5 NG/ML
ALBUMIN SERPL-MCNC: 4.3 G/DL (ref 3.4–5)
ALBUMIN/GLOB SERPL: 1.6 {RATIO} (ref 1.1–2.2)
ALP SERPL-CCNC: 85 U/L (ref 40–129)
ALT SERPL-CCNC: 23 U/L (ref 10–40)
ANION GAP SERPL CALCULATED.3IONS-SCNC: 10 MMOL/L (ref 3–16)
AST SERPL-CCNC: 22 U/L (ref 15–37)
BASOPHILS # BLD: 0.1 K/UL (ref 0–0.2)
BASOPHILS NFR BLD: 1.3 %
BILIRUB SERPL-MCNC: 0.4 MG/DL (ref 0–1)
BUN SERPL-MCNC: 18 MG/DL (ref 7–20)
CALCIUM SERPL-MCNC: 9.6 MG/DL (ref 8.3–10.6)
CHLORIDE SERPL-SCNC: 108 MMOL/L (ref 99–110)
CHOLEST SERPL-MCNC: 141 MG/DL (ref 0–199)
CO2 SERPL-SCNC: 22 MMOL/L (ref 21–32)
CREAT SERPL-MCNC: 1.1 MG/DL (ref 0.9–1.3)
DEPRECATED RDW RBC AUTO: 16.2 % (ref 12.4–15.4)
EOSINOPHIL # BLD: 0.3 K/UL (ref 0–0.6)
EOSINOPHIL NFR BLD: 6.4 %
GFR SERPLBLD CREATININE-BSD FMLA CKD-EPI: 77 ML/MIN/{1.73_M2}
GLUCOSE SERPL-MCNC: 116 MG/DL (ref 70–99)
HCT VFR BLD AUTO: 41 % (ref 40.5–52.5)
HDLC SERPL-MCNC: 34 MG/DL (ref 40–60)
HGB BLD-MCNC: 13.7 G/DL (ref 13.5–17.5)
LDLC SERPL CALC-MCNC: 74 MG/DL
LYMPHOCYTES # BLD: 1.2 K/UL (ref 1–5.1)
LYMPHOCYTES NFR BLD: 23.9 %
MCH RBC QN AUTO: 27.2 PG (ref 26–34)
MCHC RBC AUTO-ENTMCNC: 33.5 G/DL (ref 31–36)
MCV RBC AUTO: 81.1 FL (ref 80–100)
MONOCYTES # BLD: 0.4 K/UL (ref 0–1.3)
MONOCYTES NFR BLD: 8.1 %
NEUTROPHILS # BLD: 2.9 K/UL (ref 1.7–7.7)
NEUTROPHILS NFR BLD: 60.3 %
PLATELET # BLD AUTO: 177 K/UL (ref 135–450)
PMV BLD AUTO: 9.7 FL (ref 5–10.5)
POTASSIUM SERPL-SCNC: 4.4 MMOL/L (ref 3.5–5.1)
PROT SERPL-MCNC: 7 G/DL (ref 6.4–8.2)
PSA SERPL DL<=0.01 NG/ML-MCNC: 0.56 NG/ML (ref 0–4)
RBC # BLD AUTO: 5.05 M/UL (ref 4.2–5.9)
SODIUM SERPL-SCNC: 140 MMOL/L (ref 136–145)
TRIGL SERPL-MCNC: 165 MG/DL (ref 0–150)
VLDLC SERPL CALC-MCNC: 33 MG/DL
WBC # BLD AUTO: 4.9 K/UL (ref 4–11)

## 2024-05-30 PROCEDURE — 84153 ASSAY OF PSA TOTAL: CPT

## 2024-05-30 PROCEDURE — 80061 LIPID PANEL: CPT

## 2024-05-30 PROCEDURE — 84270 ASSAY OF SEX HORMONE GLOBUL: CPT

## 2024-05-30 PROCEDURE — 84403 ASSAY OF TOTAL TESTOSTERONE: CPT

## 2024-05-30 PROCEDURE — 82306 VITAMIN D 25 HYDROXY: CPT

## 2024-05-30 PROCEDURE — 80053 COMPREHEN METABOLIC PANEL: CPT

## 2024-05-30 PROCEDURE — 83036 HEMOGLOBIN GLYCOSYLATED A1C: CPT

## 2024-05-30 PROCEDURE — 85025 COMPLETE CBC W/AUTO DIFF WBC: CPT

## 2024-05-30 PROCEDURE — 36415 COLL VENOUS BLD VENIPUNCTURE: CPT

## 2024-05-31 ENCOUNTER — HOSPITAL ENCOUNTER (OUTPATIENT)
Dept: CARDIAC REHAB | Age: 59
Setting detail: THERAPIES SERIES
Discharge: HOME | End: 2024-05-31
Payer: COMMERCIAL

## 2024-05-31 ENCOUNTER — APPOINTMENT (OUTPATIENT)
Dept: CARDIAC REHAB | Age: 59
End: 2024-05-31
Payer: COMMERCIAL

## 2024-05-31 LAB
EST. AVERAGE GLUCOSE BLD GHB EST-MCNC: 91.1 MG/DL
HBA1C MFR BLD: 4.8 %
SHBG SERPL-SCNC: 158 NMOL/L (ref 19–76)
TESTOST FREE SERPL-MCNC: 34.4 PG/ML (ref 47–244)
TESTOST SERPL-MCNC: 566 NG/DL (ref 193–740)

## 2024-05-31 PROCEDURE — 93798 PHYS/QHP OP CAR RHAB W/ECG: CPT

## 2024-06-03 ENCOUNTER — APPOINTMENT (OUTPATIENT)
Dept: CARDIAC REHAB | Age: 59
End: 2024-06-03
Payer: COMMERCIAL

## 2024-06-03 ENCOUNTER — HOSPITAL ENCOUNTER (OUTPATIENT)
Dept: CARDIAC REHAB | Age: 59
Setting detail: THERAPIES SERIES
Discharge: HOME | End: 2024-06-03
Payer: COMMERCIAL

## 2024-06-03 PROCEDURE — 93798 PHYS/QHP OP CAR RHAB W/ECG: CPT

## 2024-06-05 ENCOUNTER — APPOINTMENT (OUTPATIENT)
Dept: CARDIAC REHAB | Age: 59
End: 2024-06-05
Payer: COMMERCIAL

## 2024-06-05 ENCOUNTER — OFFICE VISIT (OUTPATIENT)
Dept: ENDOCRINOLOGY | Age: 59
End: 2024-06-05
Payer: COMMERCIAL

## 2024-06-05 VITALS
SYSTOLIC BLOOD PRESSURE: 112 MMHG | HEART RATE: 92 BPM | DIASTOLIC BLOOD PRESSURE: 68 MMHG | WEIGHT: 212.6 LBS | OXYGEN SATURATION: 96 % | BODY MASS INDEX: 29.76 KG/M2 | HEIGHT: 71 IN

## 2024-06-05 DIAGNOSIS — N46.9 INFERTILITY MALE: ICD-10-CM

## 2024-06-05 DIAGNOSIS — E29.1 HYPOGONADISM MALE: Primary | ICD-10-CM

## 2024-06-05 DIAGNOSIS — E55.9 VITAMIN D DEFICIENCY: ICD-10-CM

## 2024-06-05 PROCEDURE — 3078F DIAST BP <80 MM HG: CPT | Performed by: INTERNAL MEDICINE

## 2024-06-05 PROCEDURE — 99214 OFFICE O/P EST MOD 30 MIN: CPT | Performed by: INTERNAL MEDICINE

## 2024-06-05 PROCEDURE — 3074F SYST BP LT 130 MM HG: CPT | Performed by: INTERNAL MEDICINE

## 2024-06-05 RX ORDER — TESTOSTERONE 16.2 MG/G
4 GEL TRANSDERMAL DAILY
Qty: 2 EACH | Refills: 3 | Status: SHIPPED | OUTPATIENT
Start: 2024-06-05 | End: 2024-12-02

## 2024-06-05 NOTE — PROGRESS NOTES
Patient ID:   Arash Valdes is a 58 y.o. male    Chief Complaint:   Arash Valdes is seen for an evaluation of  hypogonadism.     Subjective:   Arash Valdes had MRI brain showing pituitary tumor in Oct 2021. There is a nonenhancing cystic lesion within the sella extending into the suprasellar cistern measuring approximately 11 x 9 x 14 mm (AP x TRANS x CC).  This appears to contact the optic chiasm without definitive elevation. 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      MRI also showed somewhat lobulated T2 hyperintense lesion within the left parietal lobe with associated susceptibility.  This is most compatible with a cavernoma.  There is no surrounding edema.      He saw Dr. Uriarte. He is been told to have small aneurysm.   VF normal - Dec 2021      TT 21, LH and FSH low normal - Oct 2021   IGF slightly low at 44, ACTH, prolactin, Am cortisol, TSH, T4, Free T3 - Oct 2021     Denies head traumas   Fathered two children, doing well   Does not smoke     Denies drug abuse, opioids, steroids      DM, HTN and metabolic diseases as per pcp   Clomid failed     Interval history:      Hear disease found on calcium score.   Had open heart surgery, had three vessel disease  - March 2024     Taking Androgel 1.6% , 4 actuations daily . Off for 4-5 weeks.   Taking every day and does all precautions to avoid exposure to other      Headaches, libido issues are better since surgery   e has headaches started Sep 2021. No change    Libido 10/10 despite he is out of TRT   Performance is good, back to normal. He still has premature ejaculation.   Erections are good .    Energy levels are better. Started rehab , May 2024   Has more morning erections   No issues in concentration    Sleeping well , goes to rest room 3-4 times per night . Drinking 10 of 16 ounce bottles   Shaves once  a week   Denies muscle weakness. No work outs    Denies depression or anxiety   Denies nipple discharge or gynecomastia      The

## 2024-06-07 ENCOUNTER — TELEPHONE (OUTPATIENT)
Dept: ENDOCRINOLOGY | Age: 59
End: 2024-06-07

## 2024-06-07 ENCOUNTER — HOSPITAL ENCOUNTER (OUTPATIENT)
Dept: CARDIAC REHAB | Age: 59
Setting detail: THERAPIES SERIES
Discharge: HOME | End: 2024-06-07
Payer: COMMERCIAL

## 2024-06-07 ENCOUNTER — APPOINTMENT (OUTPATIENT)
Dept: CARDIAC REHAB | Age: 59
End: 2024-06-07
Payer: COMMERCIAL

## 2024-06-07 PROCEDURE — 93798 PHYS/QHP OP CAR RHAB W/ECG: CPT

## 2024-06-07 NOTE — TELEPHONE ENCOUNTER
Submitted PA for Testosterone  Via [facility] [place]: [id number]  STATUS: PENDING.    Follow up done daily; if no decision with in three days we will refax.  If another three days goes by with no decision will call the insurance for status.

## 2024-06-10 ENCOUNTER — APPOINTMENT (OUTPATIENT)
Dept: CARDIAC REHAB | Age: 59
End: 2024-06-10
Payer: COMMERCIAL

## 2024-06-10 ENCOUNTER — HOSPITAL ENCOUNTER (OUTPATIENT)
Dept: CARDIAC REHAB | Age: 59
Setting detail: THERAPIES SERIES
Discharge: HOME | End: 2024-06-10
Payer: COMMERCIAL

## 2024-06-10 PROCEDURE — 93798 PHYS/QHP OP CAR RHAB W/ECG: CPT

## 2024-06-12 ENCOUNTER — HOSPITAL ENCOUNTER (OUTPATIENT)
Dept: CARDIAC REHAB | Age: 59
Setting detail: THERAPIES SERIES
End: 2024-06-12
Payer: COMMERCIAL

## 2024-06-12 ENCOUNTER — APPOINTMENT (OUTPATIENT)
Dept: CARDIAC REHAB | Age: 59
End: 2024-06-12
Payer: COMMERCIAL

## 2024-06-12 ENCOUNTER — HOSPITAL ENCOUNTER (OUTPATIENT)
Dept: CARDIAC REHAB | Age: 59
Setting detail: THERAPIES SERIES
Discharge: HOME | End: 2024-06-12
Payer: COMMERCIAL

## 2024-06-12 PROCEDURE — 93798 PHYS/QHP OP CAR RHAB W/ECG: CPT

## 2024-06-12 PROCEDURE — 93797 PHYS/QHP OP CAR RHAB WO ECG: CPT

## 2024-06-12 NOTE — CARDIO/PULMONARY
[facility] Cardiovascular Rehabilitation  Nutrition Counseling      Patient Name: Arash Valdes. Date of Birth: [id number]. MRN: [id number]      ASSESSMENT  Patient is a 59 y.o. male seen for initial MNT visit for cardiac nutrition    Pertinent Medical History: CABG 3/20.    \"Rate Your Plate\" initial assessment score: 41 to 57 - there are some ways you can make your eating habits healthier     \"Rate Your Plate\" initial nutrition goals:   Opt for low sodium or salt substitutes     Patient's report of current knowledge on the nutrition topic: somewhat knowledgeable     Is the patient already making diet changes? Yes, several changes since surgery. If so, what? Leaner ground beef, reduce egg yolk consumption, using tub margarine.     Diet Recall:  The patient's typical intake consists of:   - Breakfast: pre-made breakfast burrito  - Lunch: salad four days a week; one day a week a salad and small pizza  - Dinner: carb (pasta, potato); varies on meat; chili, spaghetti.   - Snacks: fruit (apples, grapes), peanuts.  - Beverages: water.     -Who is Responsible for Meal Preparation? Pt's wife    -Who is Responsible for Grocery Shopping? Both patient and wife     -How Many Times Per Week Do You Eat Meals Outside the Home? 2      DIAGNOSIS  Nutrition Diagnosis: Food and nutrition-related knowledge deficit related to Lack of previous MNT/currently undergoing MNT as evidenced by Conditions associated with a diagnosis or treatment.      INTERVENTION  Instructed the Patient on:   [x] Importance of a heart-healthy diet  [x] Limiting saturated fats, avoiding trans fats, and opting for unsaturated fats  [x] Reducing sodium intake  [x] Adequate fiber intake  [x] Portion Sizes  [] Fluid Restriction   [x] Eating Out  [] Carbohydrate Counting   [] Other:     Educational Materials Provided:   [x] RD-Made Cardiac Rehabilitation Nutrition Booklet (using AHA, NCM resources)   [x] NCM (Nutrition Care Manual) Heart-Healthy List of Foods to

## 2024-06-13 NOTE — TELEPHONE ENCOUNTER
Approved today  PA Case: [id number], Status: Approved, Coverage Starts on: 6/7/2024 12:00 AM, Coverage Ends on: 6/7/2025    If this requires a response please respond to the pool ( P [facility] MEDICATION PRE-AUTH).      Thank you please advise patient.

## 2024-06-14 ENCOUNTER — APPOINTMENT (OUTPATIENT)
Dept: CARDIAC REHAB | Age: 59
End: 2024-06-14
Payer: COMMERCIAL

## 2024-06-17 ENCOUNTER — APPOINTMENT (OUTPATIENT)
Dept: CARDIAC REHAB | Age: 59
End: 2024-06-17
Payer: COMMERCIAL

## 2024-06-19 ENCOUNTER — APPOINTMENT (OUTPATIENT)
Dept: CARDIAC REHAB | Age: 59
End: 2024-06-19
Payer: COMMERCIAL

## 2024-06-19 ENCOUNTER — HOSPITAL ENCOUNTER (OUTPATIENT)
Dept: CARDIAC REHAB | Age: 59
Setting detail: THERAPIES SERIES
Discharge: HOME | End: 2024-06-19
Payer: COMMERCIAL

## 2024-06-19 PROCEDURE — 93798 PHYS/QHP OP CAR RHAB W/ECG: CPT

## 2024-06-21 ENCOUNTER — APPOINTMENT (OUTPATIENT)
Dept: CARDIAC REHAB | Age: 59
End: 2024-06-21
Payer: COMMERCIAL

## 2024-06-21 ENCOUNTER — HOSPITAL ENCOUNTER (OUTPATIENT)
Dept: CARDIAC REHAB | Age: 59
Setting detail: THERAPIES SERIES
Discharge: HOME | End: 2024-06-21
Payer: COMMERCIAL

## 2024-06-21 PROCEDURE — 93798 PHYS/QHP OP CAR RHAB W/ECG: CPT

## 2024-06-24 ENCOUNTER — HOSPITAL ENCOUNTER (OUTPATIENT)
Dept: CARDIAC REHAB | Age: 59
Setting detail: THERAPIES SERIES
End: 2024-06-24
Payer: COMMERCIAL

## 2024-06-24 ENCOUNTER — APPOINTMENT (OUTPATIENT)
Dept: CARDIAC REHAB | Age: 59
End: 2024-06-24
Payer: COMMERCIAL

## 2024-06-26 ENCOUNTER — HOSPITAL ENCOUNTER (OUTPATIENT)
Dept: CARDIAC REHAB | Age: 59
Setting detail: THERAPIES SERIES
Discharge: HOME | End: 2024-06-26
Payer: COMMERCIAL

## 2024-06-26 ENCOUNTER — APPOINTMENT (OUTPATIENT)
Dept: CARDIAC REHAB | Age: 59
End: 2024-06-26
Payer: COMMERCIAL

## 2024-06-26 PROCEDURE — 93798 PHYS/QHP OP CAR RHAB W/ECG: CPT

## 2024-06-28 ENCOUNTER — HOSPITAL ENCOUNTER (OUTPATIENT)
Dept: CARDIAC REHAB | Age: 59
Setting detail: THERAPIES SERIES
Discharge: HOME | End: 2024-06-28
Payer: COMMERCIAL

## 2024-06-28 ENCOUNTER — APPOINTMENT (OUTPATIENT)
Dept: CARDIAC REHAB | Age: 59
End: 2024-06-28
Payer: COMMERCIAL

## 2024-06-28 PROCEDURE — 93798 PHYS/QHP OP CAR RHAB W/ECG: CPT

## 2024-07-01 ENCOUNTER — APPOINTMENT (OUTPATIENT)
Dept: CARDIAC REHAB | Age: 59
End: 2024-07-01
Payer: COMMERCIAL

## 2024-07-01 ENCOUNTER — HOSPITAL ENCOUNTER (OUTPATIENT)
Dept: CARDIAC REHAB | Age: 59
Setting detail: THERAPIES SERIES
Discharge: HOME | End: 2024-07-01
Payer: COMMERCIAL

## 2024-07-01 PROCEDURE — 93798 PHYS/QHP OP CAR RHAB W/ECG: CPT

## 2024-07-03 ENCOUNTER — APPOINTMENT (OUTPATIENT)
Dept: CARDIAC REHAB | Age: 59
End: 2024-07-03
Payer: COMMERCIAL

## 2024-07-03 ENCOUNTER — HOSPITAL ENCOUNTER (OUTPATIENT)
Dept: CARDIAC REHAB | Age: 59
Setting detail: THERAPIES SERIES
Discharge: HOME | End: 2024-07-03
Payer: COMMERCIAL

## 2024-07-03 PROCEDURE — 93798 PHYS/QHP OP CAR RHAB W/ECG: CPT

## 2024-07-05 ENCOUNTER — HOSPITAL ENCOUNTER (OUTPATIENT)
Dept: CARDIAC REHAB | Age: 59
Setting detail: THERAPIES SERIES
Discharge: HOME | End: 2024-07-05
Payer: COMMERCIAL

## 2024-07-05 ENCOUNTER — APPOINTMENT (OUTPATIENT)
Dept: CARDIAC REHAB | Age: 59
End: 2024-07-05
Payer: COMMERCIAL

## 2024-07-05 PROCEDURE — 93798 PHYS/QHP OP CAR RHAB W/ECG: CPT

## 2024-07-08 ENCOUNTER — APPOINTMENT (OUTPATIENT)
Dept: CARDIAC REHAB | Age: 59
End: 2024-07-08
Payer: COMMERCIAL

## 2024-07-08 ENCOUNTER — HOSPITAL ENCOUNTER (OUTPATIENT)
Dept: CARDIAC REHAB | Age: 59
Setting detail: THERAPIES SERIES
Discharge: HOME | End: 2024-07-08
Payer: COMMERCIAL

## 2024-07-08 PROCEDURE — 93798 PHYS/QHP OP CAR RHAB W/ECG: CPT

## 2024-07-10 ENCOUNTER — APPOINTMENT (OUTPATIENT)
Dept: CARDIAC REHAB | Age: 59
End: 2024-07-10
Payer: COMMERCIAL

## 2024-07-10 ENCOUNTER — OFFICE VISIT (OUTPATIENT)
Dept: FAMILY MEDICINE CLINIC | Age: 59
End: 2024-07-10
Payer: COMMERCIAL

## 2024-07-10 VITALS
HEIGHT: 71 IN | WEIGHT: 209 LBS | DIASTOLIC BLOOD PRESSURE: 70 MMHG | OXYGEN SATURATION: 98 % | SYSTOLIC BLOOD PRESSURE: 124 MMHG | BODY MASS INDEX: 29.26 KG/M2 | HEART RATE: 69 BPM

## 2024-07-10 DIAGNOSIS — I10 ESSENTIAL HYPERTENSION: ICD-10-CM

## 2024-07-10 DIAGNOSIS — R51.9 CHRONIC INTRACTABLE HEADACHE, UNSPECIFIED HEADACHE TYPE: ICD-10-CM

## 2024-07-10 DIAGNOSIS — G89.29 CHRONIC INTRACTABLE HEADACHE, UNSPECIFIED HEADACHE TYPE: ICD-10-CM

## 2024-07-10 DIAGNOSIS — Z95.1 S/P CABG X 3: ICD-10-CM

## 2024-07-10 DIAGNOSIS — E78.1 HYPERTRIGLYCERIDEMIA: ICD-10-CM

## 2024-07-10 DIAGNOSIS — E11.9 TYPE 2 DIABETES MELLITUS WITHOUT COMPLICATION, WITHOUT LONG-TERM CURRENT USE OF INSULIN (HCC): Primary | ICD-10-CM

## 2024-07-10 DIAGNOSIS — I25.10 CAD IN NATIVE ARTERY: ICD-10-CM

## 2024-07-10 PROBLEM — R06.09 DOE (DYSPNEA ON EXERTION): Status: RESOLVED | Noted: 2024-02-28 | Resolved: 2024-07-10

## 2024-07-10 PROCEDURE — 3044F HG A1C LEVEL LT 7.0%: CPT | Performed by: FAMILY MEDICINE

## 2024-07-10 PROCEDURE — 3074F SYST BP LT 130 MM HG: CPT | Performed by: FAMILY MEDICINE

## 2024-07-10 PROCEDURE — 3078F DIAST BP <80 MM HG: CPT | Performed by: FAMILY MEDICINE

## 2024-07-10 PROCEDURE — 99214 OFFICE O/P EST MOD 30 MIN: CPT | Performed by: FAMILY MEDICINE

## 2024-07-10 RX ORDER — DAPAGLIFLOZIN 10 MG/1
10 TABLET, FILM COATED ORAL EVERY MORNING
Qty: 90 TABLET | Refills: 3 | Status: SHIPPED | OUTPATIENT
Start: 2024-07-10

## 2024-07-10 NOTE — PROGRESS NOTES
(Non-Medical): No   Physical Activity: Not on file   Stress: Not on file   Social Connections: Not on file   Intimate Partner Violence: Not on file   Housing Stability: Low Risk  (3/22/2024)    Housing Stability Vital Sign     Unable to Pay for Housing in the Last Year: No     Number of Places Lived in the Last Year: 1     Unstable Housing in the Last Year: No         ROS:  Gen:  Denies fever, chills, headaches.  HEENT:  Denies cold symptoms, sore throat.  CV:  Denies chest pain or tightness, palpitations.  Pulm:  Denies shortness of breath, cough.  Abd:  Denies abdominal pain, change in bowel habits.    I have reviewed the patient's medical history in detail and updated the computerized patient record as appropriate.     OBJECTIVE:  /70   Pulse 69   Ht 1.803 m (5' 11\")   Wt 94.8 kg (209 lb)   SpO2 98%   BMI 29.15 kg/m²   GEN:  WDWN, NAD  HEENT:  NCAT,  PERRL, EOMI.  CV:  Regular rate and rhythm, S1 and S2 normal, no murmurs, clicks, gallops or rubs. No edema.  PULM:  Chest is clear, no wheezing or rales. Normal symmetric air entry throughout both lung fields.      ASSESSMENT/PLAN:  1. Type 2 diabetes mellitus without complication, without long-term current use of insulin (HCC)  Very well controlled  Continue metformin and farxiga  Stop glipizide to avoid hypoglycemia  Follow up in 3m for A1C  - dapagliflozin (FARXIGA) 10 MG tablet; Take 1 tablet by mouth every morning  Dispense: 90 tablet; Refill: 3    2. Essential hypertension  Stable.continue current medications     3. Hypertriglyceridemia  Stable.continue statin and triglide     4. Chronic intractable headache, unspecified headache type  Resolved  Ok to wean off topamax  25mg daily x 7 days, then 25mg Qod x 7 days then stop  Follow up if symptoms recur    5. CAD in native artery  Stable.continue aspirin and statin     6. S/P CABG x 3  Continue cardiac rehab

## 2024-07-12 ENCOUNTER — HOSPITAL ENCOUNTER (OUTPATIENT)
Dept: CARDIAC REHAB | Age: 59
Setting detail: THERAPIES SERIES
Discharge: HOME | End: 2024-07-12
Payer: COMMERCIAL

## 2024-07-12 ENCOUNTER — APPOINTMENT (OUTPATIENT)
Dept: CARDIAC REHAB | Age: 59
End: 2024-07-12
Payer: COMMERCIAL

## 2024-07-12 PROCEDURE — 93798 PHYS/QHP OP CAR RHAB W/ECG: CPT

## 2024-07-15 ENCOUNTER — APPOINTMENT (OUTPATIENT)
Dept: CARDIAC REHAB | Age: 59
End: 2024-07-15
Payer: COMMERCIAL

## 2024-07-15 ENCOUNTER — HOSPITAL ENCOUNTER (OUTPATIENT)
Dept: CARDIAC REHAB | Age: 59
Setting detail: THERAPIES SERIES
Discharge: HOME | End: 2024-07-15
Payer: COMMERCIAL

## 2024-07-15 PROCEDURE — 93798 PHYS/QHP OP CAR RHAB W/ECG: CPT

## 2024-07-17 ENCOUNTER — APPOINTMENT (OUTPATIENT)
Dept: CARDIAC REHAB | Age: 59
End: 2024-07-17
Payer: COMMERCIAL

## 2024-07-17 ENCOUNTER — HOSPITAL ENCOUNTER (OUTPATIENT)
Dept: CARDIAC REHAB | Age: 59
Setting detail: THERAPIES SERIES
Discharge: HOME | End: 2024-07-17
Payer: COMMERCIAL

## 2024-07-17 PROCEDURE — 93798 PHYS/QHP OP CAR RHAB W/ECG: CPT

## 2024-07-19 ENCOUNTER — APPOINTMENT (OUTPATIENT)
Dept: CARDIAC REHAB | Age: 59
End: 2024-07-19
Payer: COMMERCIAL

## 2024-07-19 ENCOUNTER — HOSPITAL ENCOUNTER (OUTPATIENT)
Dept: CARDIAC REHAB | Age: 59
Setting detail: THERAPIES SERIES
Discharge: HOME | End: 2024-07-19
Payer: COMMERCIAL

## 2024-07-19 PROCEDURE — 93798 PHYS/QHP OP CAR RHAB W/ECG: CPT

## 2024-07-22 ENCOUNTER — HOSPITAL ENCOUNTER (OUTPATIENT)
Dept: CARDIAC REHAB | Age: 59
Setting detail: THERAPIES SERIES
Discharge: HOME | End: 2024-07-22
Payer: COMMERCIAL

## 2024-07-22 ENCOUNTER — APPOINTMENT (OUTPATIENT)
Dept: CARDIAC REHAB | Age: 59
End: 2024-07-22
Payer: COMMERCIAL

## 2024-07-22 PROCEDURE — 93798 PHYS/QHP OP CAR RHAB W/ECG: CPT

## 2024-07-24 ENCOUNTER — APPOINTMENT (OUTPATIENT)
Dept: CARDIAC REHAB | Age: 59
End: 2024-07-24
Payer: COMMERCIAL

## 2024-07-24 ENCOUNTER — HOSPITAL ENCOUNTER (OUTPATIENT)
Dept: CARDIAC REHAB | Age: 59
Setting detail: THERAPIES SERIES
Discharge: HOME | End: 2024-07-24
Payer: COMMERCIAL

## 2024-07-24 PROCEDURE — 93798 PHYS/QHP OP CAR RHAB W/ECG: CPT

## 2024-07-26 ENCOUNTER — HOSPITAL ENCOUNTER (OUTPATIENT)
Dept: CARDIAC REHAB | Age: 59
Setting detail: THERAPIES SERIES
Discharge: HOME | End: 2024-07-26
Payer: COMMERCIAL

## 2024-07-26 PROCEDURE — 93798 PHYS/QHP OP CAR RHAB W/ECG: CPT

## 2024-08-05 ENCOUNTER — HOSPITAL ENCOUNTER (OUTPATIENT)
Dept: CARDIAC REHAB | Age: 59
Setting detail: THERAPIES SERIES
Discharge: HOME | End: 2024-08-05
Payer: COMMERCIAL

## 2024-08-05 PROCEDURE — 93798 PHYS/QHP OP CAR RHAB W/ECG: CPT

## 2024-08-07 ENCOUNTER — HOSPITAL ENCOUNTER (OUTPATIENT)
Dept: CARDIAC REHAB | Age: 59
Setting detail: THERAPIES SERIES
End: 2024-08-07
Payer: COMMERCIAL

## 2024-08-09 ENCOUNTER — HOSPITAL ENCOUNTER (OUTPATIENT)
Dept: CARDIAC REHAB | Age: 59
Setting detail: THERAPIES SERIES
Discharge: HOME | End: 2024-08-09
Payer: COMMERCIAL

## 2024-08-09 PROCEDURE — 93798 PHYS/QHP OP CAR RHAB W/ECG: CPT

## 2024-08-12 ENCOUNTER — HOSPITAL ENCOUNTER (OUTPATIENT)
Dept: CARDIAC REHAB | Age: 59
Setting detail: THERAPIES SERIES
Discharge: HOME | End: 2024-08-12
Payer: COMMERCIAL

## 2024-08-12 PROCEDURE — 93798 PHYS/QHP OP CAR RHAB W/ECG: CPT

## 2024-08-14 ENCOUNTER — HOSPITAL ENCOUNTER (OUTPATIENT)
Dept: CARDIAC REHAB | Age: 59
Setting detail: THERAPIES SERIES
Discharge: HOME | End: 2024-08-14
Payer: COMMERCIAL

## 2024-08-14 PROCEDURE — 93798 PHYS/QHP OP CAR RHAB W/ECG: CPT

## 2024-08-23 RX ORDER — ATORVASTATIN CALCIUM 40 MG/1
40 TABLET, FILM COATED ORAL NIGHTLY
Qty: 30 TABLET | Refills: 3 | Status: SHIPPED | OUTPATIENT
Start: 2024-08-23

## 2024-08-23 NOTE — TELEPHONE ENCOUNTER
Patient requesting refill of...atorvastatin (LIPITOR) 40 MG tablet [[id number]]    Order Details    Dose: 40 mg Route: Oral Frequency: NIGHTLY   Dispense Quantity: 30 tablet Refills: 3              Last visit date: 7/10/2024    [id number] [address] - P [phone] - F [phone]

## 2024-08-23 NOTE — TELEPHONE ENCOUNTER
Medication:   Requested Prescriptions     Pending Prescriptions Disp Refills    atorvastatin (LIPITOR) 40 MG tablet 30 tablet 3     Sig: Take 1 tablet by mouth nightly        Last Filled:      Patient Phone Number: [phone] (home)     Last appt: 7/10/2024   Next appt: 10/10/2024    Last OARRS:       7/28/2022     4:29 PM   RX Monitoring   Periodic Controlled Substance Monitoring No signs of potential drug abuse or diversion identified.

## 2024-09-05 ENCOUNTER — HOSPITAL ENCOUNTER (OUTPATIENT)
Age: 59
Discharge: HOME | End: 2024-09-05
Payer: COMMERCIAL

## 2024-09-05 DIAGNOSIS — E29.1 HYPOGONADISM MALE: ICD-10-CM

## 2024-09-05 LAB
ALBUMIN SERPL-MCNC: 4.4 G/DL (ref 3.4–5)
ALBUMIN/GLOB SERPL: 2 {RATIO} (ref 1.1–2.2)
ALP SERPL-CCNC: 74 U/L (ref 40–129)
ALT SERPL-CCNC: 32 U/L (ref 10–40)
ANION GAP SERPL CALCULATED.3IONS-SCNC: 13 MMOL/L (ref 3–16)
AST SERPL-CCNC: 32 U/L (ref 15–37)
BASOPHILS # BLD: 0 K/UL (ref 0–0.2)
BASOPHILS NFR BLD: 0.9 %
BILIRUB SERPL-MCNC: 0.7 MG/DL (ref 0–1)
BUN SERPL-MCNC: 18 MG/DL (ref 7–20)
CALCIUM SERPL-MCNC: 9.5 MG/DL (ref 8.3–10.6)
CHLORIDE SERPL-SCNC: 104 MMOL/L (ref 99–110)
CO2 SERPL-SCNC: 22 MMOL/L (ref 21–32)
CREAT SERPL-MCNC: 1 MG/DL (ref 0.8–1.3)
DEPRECATED RDW RBC AUTO: 15.6 % (ref 12.4–15.4)
EOSINOPHIL # BLD: 0.2 K/UL (ref 0–0.6)
EOSINOPHIL NFR BLD: 4 %
GFR SERPLBLD CREATININE-BSD FMLA CKD-EPI: 86 ML/MIN/{1.73_M2}
GLUCOSE SERPL-MCNC: 142 MG/DL (ref 70–99)
HCT VFR BLD AUTO: 43.1 % (ref 40.5–52.5)
HGB BLD-MCNC: 14.3 G/DL (ref 13.5–17.5)
LYMPHOCYTES # BLD: 1.2 K/UL (ref 1–5.1)
LYMPHOCYTES NFR BLD: 22.7 %
MCH RBC QN AUTO: 27.7 PG (ref 26–34)
MCHC RBC AUTO-ENTMCNC: 33.1 G/DL (ref 31–36)
MCV RBC AUTO: 83.8 FL (ref 80–100)
MONOCYTES # BLD: 0.5 K/UL (ref 0–1.3)
MONOCYTES NFR BLD: 10 %
NEUTROPHILS # BLD: 3.2 K/UL (ref 1.7–7.7)
NEUTROPHILS NFR BLD: 62.4 %
PLATELET # BLD AUTO: 179 K/UL (ref 135–450)
PMV BLD AUTO: 9.3 FL (ref 5–10.5)
POTASSIUM SERPL-SCNC: 3.9 MMOL/L (ref 3.5–5.1)
PROT SERPL-MCNC: 6.6 G/DL (ref 6.4–8.2)
RBC # BLD AUTO: 5.14 M/UL (ref 4.2–5.9)
SODIUM SERPL-SCNC: 139 MMOL/L (ref 136–145)
WBC # BLD AUTO: 5.1 K/UL (ref 4–11)

## 2024-09-05 PROCEDURE — 82671 ASSAY OF ESTROGENS: CPT

## 2024-09-05 PROCEDURE — 84403 ASSAY OF TOTAL TESTOSTERONE: CPT

## 2024-09-05 PROCEDURE — 36415 COLL VENOUS BLD VENIPUNCTURE: CPT

## 2024-09-05 PROCEDURE — 85025 COMPLETE CBC W/AUTO DIFF WBC: CPT

## 2024-09-05 PROCEDURE — 84270 ASSAY OF SEX HORMONE GLOBUL: CPT

## 2024-09-05 PROCEDURE — 80053 COMPREHEN METABOLIC PANEL: CPT

## 2024-09-06 LAB
SHBG SERPL-SCNC: 146 NMOL/L (ref 19–76)
TESTOST FREE SERPL-MCNC: 27.1 PG/ML (ref 47–244)
TESTOST SERPL-MCNC: 425 NG/DL (ref 193–740)

## 2024-09-09 LAB
ESTRADIOL SERPL HS-MCNC: 12.7 PG/ML (ref 10–42)
ESTROGEN SERPL CALC-MCNC: 31 PG/ML (ref 19–69)
ESTRONE SERPL-MCNC: 18.3 PG/ML (ref 9–36)

## 2024-09-11 ENCOUNTER — OFFICE VISIT (OUTPATIENT)
Dept: ENDOCRINOLOGY | Age: 59
End: 2024-09-11
Payer: COMMERCIAL

## 2024-09-11 VITALS
BODY MASS INDEX: 31.36 KG/M2 | HEIGHT: 71 IN | SYSTOLIC BLOOD PRESSURE: 132 MMHG | DIASTOLIC BLOOD PRESSURE: 76 MMHG | WEIGHT: 224 LBS | HEART RATE: 78 BPM | OXYGEN SATURATION: 97 %

## 2024-09-11 DIAGNOSIS — E29.1 HYPOGONADISM MALE: Primary | ICD-10-CM

## 2024-09-11 DIAGNOSIS — N52.9 ERECTILE DYSFUNCTION, UNSPECIFIED ERECTILE DYSFUNCTION TYPE: ICD-10-CM

## 2024-09-11 DIAGNOSIS — E55.9 VITAMIN D DEFICIENCY: ICD-10-CM

## 2024-09-11 DIAGNOSIS — N46.9 INFERTILITY MALE: ICD-10-CM

## 2024-09-11 PROCEDURE — 3075F SYST BP GE 130 - 139MM HG: CPT | Performed by: INTERNAL MEDICINE

## 2024-09-11 PROCEDURE — 99214 OFFICE O/P EST MOD 30 MIN: CPT | Performed by: INTERNAL MEDICINE

## 2024-09-11 PROCEDURE — 3078F DIAST BP <80 MM HG: CPT | Performed by: INTERNAL MEDICINE

## 2024-10-10 ENCOUNTER — OFFICE VISIT (OUTPATIENT)
Dept: FAMILY MEDICINE CLINIC | Age: 59
End: 2024-10-10

## 2024-10-10 VITALS
HEART RATE: 88 BPM | BODY MASS INDEX: 31.08 KG/M2 | OXYGEN SATURATION: 100 % | WEIGHT: 222 LBS | SYSTOLIC BLOOD PRESSURE: 128 MMHG | DIASTOLIC BLOOD PRESSURE: 80 MMHG | HEIGHT: 71 IN

## 2024-10-10 DIAGNOSIS — I25.118 CORONARY ARTERY DISEASE OF NATIVE ARTERY OF NATIVE HEART WITH STABLE ANGINA PECTORIS (HCC): ICD-10-CM

## 2024-10-10 DIAGNOSIS — E78.1 HYPERTRIGLYCERIDEMIA: ICD-10-CM

## 2024-10-10 DIAGNOSIS — E11.9 TYPE 2 DIABETES MELLITUS WITHOUT COMPLICATION, WITHOUT LONG-TERM CURRENT USE OF INSULIN (HCC): Primary | ICD-10-CM

## 2024-10-10 DIAGNOSIS — I10 ESSENTIAL HYPERTENSION: ICD-10-CM

## 2024-10-10 DIAGNOSIS — Z95.1 S/P CABG X 3: ICD-10-CM

## 2024-10-10 LAB — HBA1C MFR BLD: 6.8 %

## 2024-10-10 NOTE — PROGRESS NOTES
symptoms, sore throat.  CV:  Denies chest pain or tightness, palpitations.  Pulm:  Denies shortness of breath, cough.  Abd:  Denies abdominal pain, change in bowel habits.    I have reviewed the patient's medical history in detail and updated the computerized patient record as appropriate.     OBJECTIVE:  /80   Pulse 88   Ht 1.803 m (5' 11\")   Wt 100.7 kg (222 lb)   SpO2 100%   BMI 30.96 kg/m²   GEN:  WDWN, NAD  HEENT:  NCAT, TM/OP nl, PERRL, EOMI.  NECK:  Supple without adenopathy.  CV:  Regular rate and rhythm, S1 and S2 normal, no murmurs, clicks, gallops or rubs. No edema.  PULM:  Chest is clear, no wheezing or rales. Normal symmetric air entry throughout both lung fields.  ABD: soft, Non-tender, non-distended, normal bowel sounds. No organomegaly     ASSESSMENT/PLAN:  1. Type 2 diabetes mellitus without complication, without long-term current use of insulin (HCC)  A1c up from 4.8 to 6.8, likely due to lack of exercise and poor diet  Discussed lifestyle changes  Discussed switching farxiga to ozempic, he declines today but will think about it  Follow up in 6m   - POCT glycosylated hemoglobin (Hb A1C)    2. Essential hypertension  Stable. Continue current medications     3. Hypertriglyceridemia  Continue statin     4. Coronary artery disease of native artery of native heart with stable angina pectoris (HCC)  Stable. Follows with cardiology     5. S/P CABG x 3      Discussed the importance of a low carb diet with regular cardiovascular exercise.  Patient was given educational handouts regarding proper low carb/low calorie diet.

## 2024-10-30 DIAGNOSIS — E11.9 TYPE 2 DIABETES MELLITUS WITHOUT COMPLICATION, WITHOUT LONG-TERM CURRENT USE OF INSULIN (HCC): ICD-10-CM

## 2024-10-30 RX ORDER — METFORMIN HYDROCHLORIDE 500 MG/1
1000 TABLET, EXTENDED RELEASE ORAL
Qty: 180 TABLET | Refills: 1 | Status: SHIPPED | OUTPATIENT
Start: 2024-10-30

## 2024-10-30 NOTE — TELEPHONE ENCOUNTER
Medication:   Requested Prescriptions     Pending Prescriptions Disp Refills    metFORMIN (GLUCOPHAGE-XR) 500 MG extended release tablet [Pharmacy Med Name: METFORMIN HCL  MG TABLET] 180 tablet      Sig: TAKE 2 TABLETS BY MOUTH DAILY WITH BREAKFAST        Last Filled:  4/4/2024    Patient Phone Number: [phone] (home)     Last appt: 10/10/2024   Next appt: 4/10/2025    Last OARRS:       7/28/2022     4:29 PM   RX Monitoring   Periodic Controlled Substance Monitoring No signs of potential drug abuse or diversion identified.

## 2024-11-18 RX ORDER — ATORVASTATIN CALCIUM 40 MG/1
40 TABLET, FILM COATED ORAL NIGHTLY
Qty: 90 TABLET | Refills: 3 | Status: SHIPPED | OUTPATIENT
Start: 2024-11-18

## 2024-11-18 NOTE — TELEPHONE ENCOUNTER
Medication:   Requested Prescriptions     Pending Prescriptions Disp Refills    atorvastatin (LIPITOR) 40 MG tablet [Pharmacy Med Name: ATORVASTATIN 40 MG TABLET] 90 tablet      Sig: TAKE ONE TABLET BY MOUTH ONCE NIGHTLY        Last Filled:  8/23/2024    Patient Phone Number: [phone] (home)     Last appt: 10/10/2024   Next appt: 4/10/2025    Last OARRS:       7/28/2022     4:29 PM   RX Monitoring   Periodic Controlled Substance Monitoring No signs of potential drug abuse or diversion identified.

## 2024-11-19 DIAGNOSIS — E78.2 ELEVATED TRIGLYCERIDES WITH HIGH CHOLESTEROL: ICD-10-CM

## 2024-11-20 ENCOUNTER — HOSPITAL ENCOUNTER (OUTPATIENT)
Age: 59
Discharge: HOME | End: 2024-11-22
Payer: COMMERCIAL

## 2024-11-20 VITALS
DIASTOLIC BLOOD PRESSURE: 81 MMHG | SYSTOLIC BLOOD PRESSURE: 132 MMHG | HEIGHT: 71 IN | WEIGHT: 222 LBS | BODY MASS INDEX: 31.08 KG/M2

## 2024-11-20 DIAGNOSIS — Z95.1 HX OF CABG: ICD-10-CM

## 2024-11-20 DIAGNOSIS — I25.83 CORONARY ARTERY DISEASE DUE TO LIPID RICH PLAQUE: ICD-10-CM

## 2024-11-20 DIAGNOSIS — E78.00 HYPERCHOLESTEROLEMIA: ICD-10-CM

## 2024-11-20 DIAGNOSIS — I10 ESSENTIAL HYPERTENSION: ICD-10-CM

## 2024-11-20 DIAGNOSIS — I25.10 CORONARY ARTERY DISEASE DUE TO LIPID RICH PLAQUE: ICD-10-CM

## 2024-11-20 LAB
ECHO AO ASC DIAM: 3.4 CM
ECHO AO ASCENDING AORTA INDEX: 1.55 CM/M2
ECHO AO ROOT DIAM: 3.6 CM
ECHO AO ROOT INDEX: 1.64 CM/M2
ECHO AV AREA PEAK VELOCITY: 3.3 CM2
ECHO AV AREA VTI: 3.2 CM2
ECHO AV AREA/BSA PEAK VELOCITY: 1.5 CM2/M2
ECHO AV AREA/BSA VTI: 1.5 CM2/M2
ECHO AV MEAN GRADIENT: 4 MMHG
ECHO AV MEAN VELOCITY: 0.9 M/S
ECHO AV PEAK GRADIENT: 5 MMHG
ECHO AV PEAK VELOCITY: 1.1 M/S
ECHO AV VELOCITY RATIO: 1
ECHO AV VTI: 22 CM
ECHO BSA: 2.25 M2
ECHO LA AREA 2C: 19.5 CM2
ECHO LA AREA 4C: 15.4 CM2
ECHO LA DIAMETER INDEX: 1.64 CM/M2
ECHO LA DIAMETER: 3.6 CM
ECHO LA MAJOR AXIS: 5.6 CM
ECHO LA MINOR AXIS: 5.7 CM
ECHO LA TO AORTIC ROOT RATIO: 1
ECHO LA VOL BP: 43 ML (ref 18–58)
ECHO LA VOL MOD A2C: 54 ML (ref 18–58)
ECHO LA VOL MOD A4C: 34 ML (ref 18–58)
ECHO LA VOL/BSA BIPLANE: 20 ML/M2 (ref 16–34)
ECHO LA VOLUME INDEX MOD A2C: 25 ML/M2 (ref 16–34)
ECHO LA VOLUME INDEX MOD A4C: 15 ML/M2 (ref 16–34)
ECHO LV E' LATERAL VELOCITY: 14.4 CM/S
ECHO LV E' SEPTAL VELOCITY: 9.46 CM/S
ECHO LV EDV A2C: 70 ML
ECHO LV EDV A4C: 87 ML
ECHO LV EDV INDEX A4C: 40 ML/M2
ECHO LV EDV NDEX A2C: 32 ML/M2
ECHO LV EJECTION FRACTION A2C: 65 %
ECHO LV EJECTION FRACTION A4C: 63 %
ECHO LV EJECTION FRACTION BIPLANE: 64 % (ref 55–100)
ECHO LV ESV A2C: 24 ML
ECHO LV ESV A4C: 32 ML
ECHO LV ESV INDEX A2C: 11 ML/M2
ECHO LV ESV INDEX A4C: 15 ML/M2
ECHO LV FRACTIONAL SHORTENING: 29 % (ref 28–44)
ECHO LV INTERNAL DIMENSION DIASTOLE INDEX: 1.91 CM/M2
ECHO LV INTERNAL DIMENSION DIASTOLIC: 4.2 CM (ref 4.2–5.9)
ECHO LV INTERNAL DIMENSION SYSTOLIC INDEX: 1.36 CM/M2
ECHO LV INTERNAL DIMENSION SYSTOLIC: 3 CM
ECHO LV IVSD: 1.2 CM (ref 0.6–1)
ECHO LV MASS 2D: 178.2 G (ref 88–224)
ECHO LV MASS INDEX 2D: 81 G/M2 (ref 49–115)
ECHO LV POSTERIOR WALL DIASTOLIC: 1.2 CM (ref 0.6–1)
ECHO LV RELATIVE WALL THICKNESS RATIO: 0.57
ECHO LVOT AREA: 3.5 CM2
ECHO LVOT AV VTI INDEX: 0.91
ECHO LVOT DIAM: 2.1 CM
ECHO LVOT MEAN GRADIENT: 2 MMHG
ECHO LVOT PEAK GRADIENT: 5 MMHG
ECHO LVOT PEAK VELOCITY: 1.1 M/S
ECHO LVOT STROKE VOLUME INDEX: 31.6 ML/M2
ECHO LVOT SV: 69.6 ML
ECHO LVOT VTI: 20.1 CM
ECHO MV A VELOCITY: 0.63 M/S
ECHO MV E VELOCITY: 0.61 M/S
ECHO MV E/A RATIO: 0.97
ECHO MV E/E' LATERAL: 4.24
ECHO MV E/E' RATIO (AVERAGED): 5.34
ECHO MV E/E' SEPTAL: 6.45
ECHO PV MAX VELOCITY: 0.8 M/S
ECHO PV PEAK GRADIENT: 2 MMHG
ECHO RA AREA 4C: 12.8 CM2
ECHO RA END SYSTOLIC VOLUME APICAL 4 CHAMBER INDEX BSA: 13 ML/M2
ECHO RA VOLUME: 29 ML
ECHO RV BASAL DIMENSION: 3.5 CM
ECHO RV FREE WALL PEAK S': 9.6 CM/S
ECHO RV LONGITUDINAL DIMENSION: 7 CM
ECHO RV MID DIMENSION: 2.2 CM
ECHO RV TAPSE: 1.6 CM (ref 1.7–?)

## 2024-11-20 PROCEDURE — C8929 TTE W OR WO FOL WCON,DOPPLER: HCPCS

## 2024-11-20 PROCEDURE — 6360000004 HC RX CONTRAST MEDICATION: Performed by: INTERNAL MEDICINE

## 2024-11-20 RX ORDER — FENOFIBRATE 160 MG/1
160 TABLET ORAL DAILY
Qty: 30 TABLET | Refills: 5 | Status: SHIPPED | OUTPATIENT
Start: 2024-11-20

## 2024-11-20 RX ADMIN — SULFUR HEXAFLUORIDE 2 ML: 60.7; .19; .19 INJECTION, POWDER, LYOPHILIZED, FOR SUSPENSION INTRAVENOUS; INTRAVESICAL at 08:12

## 2024-11-20 NOTE — TELEPHONE ENCOUNTER
Medication:   Requested Prescriptions     Pending Prescriptions Disp Refills    fenofibrate 160 MG tablet 30 tablet 5     Sig: Take 1 tablet by mouth daily        Last Filled:  05/03/2024 #30 5rf     Patient Phone Number: [phone] (home)     Last appt: 10/10/2024   Next appt: 4/10/2025    Last OARRS:       7/28/2022     4:29 PM   RX Monitoring   Periodic Controlled Substance Monitoring No signs of potential drug abuse or diversion identified.

## 2024-11-22 ENCOUNTER — OFFICE VISIT (OUTPATIENT)
Age: 59
End: 2024-11-22
Payer: COMMERCIAL

## 2024-11-22 VITALS
HEART RATE: 90 BPM | HEIGHT: 71 IN | SYSTOLIC BLOOD PRESSURE: 120 MMHG | OXYGEN SATURATION: 98 % | WEIGHT: 214 LBS | BODY MASS INDEX: 29.96 KG/M2 | DIASTOLIC BLOOD PRESSURE: 70 MMHG

## 2024-11-22 DIAGNOSIS — Z95.1 HX OF CABG: ICD-10-CM

## 2024-11-22 DIAGNOSIS — I10 ESSENTIAL HYPERTENSION: ICD-10-CM

## 2024-11-22 DIAGNOSIS — I25.10 CORONARY ARTERY DISEASE DUE TO LIPID RICH PLAQUE: Primary | ICD-10-CM

## 2024-11-22 DIAGNOSIS — E78.00 HYPERCHOLESTEROLEMIA: ICD-10-CM

## 2024-11-22 DIAGNOSIS — I25.83 CORONARY ARTERY DISEASE DUE TO LIPID RICH PLAQUE: Primary | ICD-10-CM

## 2024-11-22 PROCEDURE — 3074F SYST BP LT 130 MM HG: CPT | Performed by: INTERNAL MEDICINE

## 2024-11-22 PROCEDURE — 3078F DIAST BP <80 MM HG: CPT | Performed by: INTERNAL MEDICINE

## 2024-11-22 PROCEDURE — 99214 OFFICE O/P EST MOD 30 MIN: CPT | Performed by: INTERNAL MEDICINE

## 2024-12-29 ENCOUNTER — PATIENT MESSAGE (OUTPATIENT)
Dept: FAMILY MEDICINE CLINIC | Age: 59
End: 2024-12-29

## 2024-12-29 DIAGNOSIS — E78.2 ELEVATED TRIGLYCERIDES WITH HIGH CHOLESTEROL: ICD-10-CM

## 2024-12-30 RX ORDER — FENOFIBRATE 160 MG/1
160 TABLET ORAL DAILY
Qty: 90 TABLET | Refills: 1 | Status: SHIPPED | OUTPATIENT
Start: 2024-12-30

## 2024-12-30 NOTE — TELEPHONE ENCOUNTER
Medication:   Requested Prescriptions     Pending Prescriptions Disp Refills    fenofibrate 160 MG tablet 90 tablet 1     Sig: Take 1 tablet by mouth daily        Last Filled:  11/20/2024 #30 5rf     Patient Phone Number: [phone] (home)     Last appt: 10/10/2024   Next appt: 4/10/2025    Last OARRS:       7/28/2022     4:29 PM   RX Monitoring   Periodic Controlled Substance Monitoring No signs of potential drug abuse or diversion identified.

## 2025-01-23 ENCOUNTER — HOSPITAL ENCOUNTER (OUTPATIENT)
Age: 60
Discharge: HOME | End: 2025-01-23
Payer: COMMERCIAL

## 2025-01-23 DIAGNOSIS — E29.1 HYPOGONADISM MALE: ICD-10-CM

## 2025-01-23 DIAGNOSIS — E55.9 VITAMIN D DEFICIENCY: ICD-10-CM

## 2025-01-23 DIAGNOSIS — N52.9 ERECTILE DYSFUNCTION, UNSPECIFIED ERECTILE DYSFUNCTION TYPE: ICD-10-CM

## 2025-01-23 DIAGNOSIS — N46.9 INFERTILITY MALE: ICD-10-CM

## 2025-01-23 LAB
ALBUMIN SERPL-MCNC: 4.7 G/DL (ref 3.4–5)
ALBUMIN/GLOB SERPL: 2 {RATIO} (ref 1.1–2.2)
ALP SERPL-CCNC: 74 U/L (ref 40–129)
ALT SERPL-CCNC: 38 U/L (ref 10–40)
ANION GAP SERPL CALCULATED.3IONS-SCNC: 11 MMOL/L (ref 3–16)
AST SERPL-CCNC: 28 U/L (ref 15–37)
BASOPHILS # BLD: 0 K/UL (ref 0–0.2)
BASOPHILS NFR BLD: 1.1 %
BILIRUB SERPL-MCNC: 0.7 MG/DL (ref 0–1)
BUN SERPL-MCNC: 20 MG/DL (ref 7–20)
CALCIUM SERPL-MCNC: 9.4 MG/DL (ref 8.3–10.6)
CHLORIDE SERPL-SCNC: 104 MMOL/L (ref 99–110)
CO2 SERPL-SCNC: 25 MMOL/L (ref 21–32)
CREAT SERPL-MCNC: 1 MG/DL (ref 0.8–1.3)
DEPRECATED RDW RBC AUTO: 13.8 % (ref 12.4–15.4)
EOSINOPHIL # BLD: 0.2 K/UL (ref 0–0.6)
EOSINOPHIL NFR BLD: 5.6 %
GFR SERPLBLD CREATININE-BSD FMLA CKD-EPI: 86 ML/MIN/{1.73_M2}
GLUCOSE SERPL-MCNC: 188 MG/DL (ref 70–99)
HCT VFR BLD AUTO: 43.9 % (ref 40.5–52.5)
HGB BLD-MCNC: 15.2 G/DL (ref 13.5–17.5)
LYMPHOCYTES # BLD: 1 K/UL (ref 1–5.1)
LYMPHOCYTES NFR BLD: 24.3 %
MCH RBC QN AUTO: 29.7 PG (ref 26–34)
MCHC RBC AUTO-ENTMCNC: 34.5 G/DL (ref 31–36)
MCV RBC AUTO: 85.9 FL (ref 80–100)
MONOCYTES # BLD: 0.4 K/UL (ref 0–1.3)
MONOCYTES NFR BLD: 10.3 %
NEUTROPHILS # BLD: 2.5 K/UL (ref 1.7–7.7)
NEUTROPHILS NFR BLD: 58.7 %
PLATELET # BLD AUTO: 149 K/UL (ref 135–450)
PMV BLD AUTO: 9.9 FL (ref 5–10.5)
POTASSIUM SERPL-SCNC: 4.7 MMOL/L (ref 3.5–5.1)
PROT SERPL-MCNC: 7.1 G/DL (ref 6.4–8.2)
RBC # BLD AUTO: 5.11 M/UL (ref 4.2–5.9)
SODIUM SERPL-SCNC: 140 MMOL/L (ref 136–145)
WBC # BLD AUTO: 4.2 K/UL (ref 4–11)

## 2025-01-23 PROCEDURE — 80053 COMPREHEN METABOLIC PANEL: CPT

## 2025-01-23 PROCEDURE — 85025 COMPLETE CBC W/AUTO DIFF WBC: CPT

## 2025-01-23 PROCEDURE — 84403 ASSAY OF TOTAL TESTOSTERONE: CPT

## 2025-01-23 PROCEDURE — 82671 ASSAY OF ESTROGENS: CPT

## 2025-01-23 PROCEDURE — 36415 COLL VENOUS BLD VENIPUNCTURE: CPT

## 2025-01-23 PROCEDURE — 84270 ASSAY OF SEX HORMONE GLOBUL: CPT

## 2025-01-23 RX ORDER — TESTOSTERONE 1.62 MG/G
4 GEL TRANSDERMAL DAILY
Qty: 2 EACH | Refills: 0 | Status: SHIPPED | OUTPATIENT
Start: 2025-01-23 | End: 2025-07-22

## 2025-01-23 NOTE — TELEPHONE ENCOUNTER
Requested Prescriptions     Pending Prescriptions Disp Refills    Testosterone (ANDROGEL) 20.25 MG/ACT (1.62%) GEL gel 2 each 3     Sig: Place 4 actuation onto the skin daily for 180 days. Max Daily Amount: 5,000 mg     Last refilled: 6/5/2024 # 2 with 3 refills    Lov: 9/11/2024  Nov:1/30/2025

## 2025-01-24 LAB
SHBG SERPL-SCNC: 137 NMOL/L (ref 19–76)
TESTOST FREE SERPL-MCNC: 51.9 PG/ML (ref 47–244)
TESTOST SERPL-MCNC: 724 NG/DL (ref 193–740)

## 2025-01-26 LAB
ESTRADIOL SERPL HS-MCNC: 15.6 PG/ML (ref 10–42)
ESTROGEN SERPL CALC-MCNC: 38.1 PG/ML (ref 19–69)
ESTRONE SERPL-MCNC: 22.5 PG/ML (ref 9–36)

## 2025-01-30 ENCOUNTER — OFFICE VISIT (OUTPATIENT)
Dept: ENDOCRINOLOGY | Age: 60
End: 2025-01-30
Payer: COMMERCIAL

## 2025-01-30 VITALS
WEIGHT: 228 LBS | HEART RATE: 88 BPM | DIASTOLIC BLOOD PRESSURE: 75 MMHG | HEIGHT: 71 IN | SYSTOLIC BLOOD PRESSURE: 119 MMHG | OXYGEN SATURATION: 96 % | BODY MASS INDEX: 31.92 KG/M2

## 2025-01-30 DIAGNOSIS — E55.9 VITAMIN D DEFICIENCY: ICD-10-CM

## 2025-01-30 DIAGNOSIS — E29.1 HYPOGONADISM MALE: Primary | ICD-10-CM

## 2025-01-30 DIAGNOSIS — N46.9 INFERTILITY MALE: ICD-10-CM

## 2025-01-30 PROCEDURE — 3078F DIAST BP <80 MM HG: CPT | Performed by: INTERNAL MEDICINE

## 2025-01-30 PROCEDURE — 3074F SYST BP LT 130 MM HG: CPT | Performed by: INTERNAL MEDICINE

## 2025-01-30 PROCEDURE — 99214 OFFICE O/P EST MOD 30 MIN: CPT | Performed by: INTERNAL MEDICINE

## 2025-01-30 RX ORDER — TESTOSTERONE 1.62 MG/G
4 GEL TRANSDERMAL DAILY
Qty: 2 EACH | Refills: 4 | Status: SHIPPED | OUTPATIENT
Start: 2025-01-30 | End: 2025-07-29

## 2025-01-30 NOTE — PROGRESS NOTES
Patient ID:   Arash Valdes is a 59 y.o. male    Chief Complaint:   Arash Valdes is seen for an evaluation of  hypogonadism.     Subjective:   Arash Valdes had MRI brain showing pituitary tumor in Oct 2021. There is a nonenhancing cystic lesion within the sella extending into the suprasellar cistern measuring approximately 11 x 9 x 14 mm (AP x TRANS x CC).  This appears to contact the optic chiasm without definitive elevation. 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      MRI also showed somewhat lobulated T2 hyperintense lesion within the left parietal lobe with associated susceptibility.  This is most compatible with a cavernoma.  There is no surrounding edema.      He saw Dr. Uriarte. He is been told to have small aneurysm.   VF normal - Dec 2021      TT 21, LH and FSH low normal - Oct 2021   IGF slightly low at 44, ACTH, prolactin, Am cortisol, TSH, T4, Free T3 - Oct 2021     Denies head traumas   Fathered two children, doing well   Does not smoke     Denies drug abuse, opioids, steroids      DM, HTN and metabolic diseases as per pcp   Clomid failed     Interval history:      Hear disease found on calcium score.   Had open heart surgery, had three vessel disease  - March 2024     Taking Androgel 1.6% , 4 actuations daily . Wife puts the cream on him. She takes precautions to avoid exposure.    Taking every day and does all precautions to avoid exposure to other      Headaches gone since CABG     Libido is good, 7/10    Performance is 6-7/10, not back to normal. He still has premature ejaculation, he feels its way too quick.   Energy levels are low recently. Done with rehab. Not doing any exercise    Has some morning erections, stable    No issues in concentration    Sleeping well , goes to rest room 2-3 times per night . Drinking 10 of 16 ounce bottles   Durand is not growing as fast   Denies muscle weakness.    Denies depression or anxiety   Denies nipple discharge or gynecomastia      The

## 2025-03-20 DIAGNOSIS — E78.2 ELEVATED TRIGLYCERIDES WITH HIGH CHOLESTEROL: ICD-10-CM

## 2025-03-20 RX ORDER — FENOFIBRATE 160 MG/1
160 TABLET ORAL DAILY
Qty: 90 TABLET | Refills: 1 | Status: SHIPPED | OUTPATIENT
Start: 2025-03-20

## 2025-03-20 NOTE — TELEPHONE ENCOUNTER
Medication:   Requested Prescriptions     Pending Prescriptions Disp Refills    fenofibrate 160 MG tablet [Pharmacy Med Name: FENOFIBRATE 160 MG TABLET] 90 tablet 1     Sig: TAKE 1 TABLET BY MOUTH DAILY        Last Filled:  12/30/2024 #90 1rf    Patient Phone Number: [phone] (home)     Last appt: 10/10/2024   Next appt: 4/10/2025    Last OARRS:       7/28/2022     4:29 PM   RX Monitoring   Periodic Controlled Substance Monitoring No signs of potential drug abuse or diversion identified.

## 2025-04-24 ASSESSMENT — PATIENT HEALTH QUESTIONNAIRE - PHQ9
2. FEELING DOWN, DEPRESSED OR HOPELESS: NOT AT ALL
SUM OF ALL RESPONSES TO PHQ QUESTIONS 1-9: 0
SUM OF ALL RESPONSES TO PHQ QUESTIONS 1-9: 0
1. LITTLE INTEREST OR PLEASURE IN DOING THINGS: NOT AT ALL
2. FEELING DOWN, DEPRESSED OR HOPELESS: NOT AT ALL
SUM OF ALL RESPONSES TO PHQ9 QUESTIONS 1 & 2: 0
1. LITTLE INTEREST OR PLEASURE IN DOING THINGS: NOT AT ALL
SUM OF ALL RESPONSES TO PHQ QUESTIONS 1-9: 0
SUM OF ALL RESPONSES TO PHQ QUESTIONS 1-9: 0

## 2025-04-25 SDOH — ECONOMIC STABILITY: TRANSPORTATION INSECURITY
IN THE PAST 12 MONTHS, HAS THE LACK OF TRANSPORTATION KEPT YOU FROM MEDICAL APPOINTMENTS OR FROM GETTING MEDICATIONS?: NO

## 2025-04-25 SDOH — ECONOMIC STABILITY: INCOME INSECURITY: IN THE LAST 12 MONTHS, WAS THERE A TIME WHEN YOU WERE NOT ABLE TO PAY THE MORTGAGE OR RENT ON TIME?: NO

## 2025-04-25 SDOH — ECONOMIC STABILITY: FOOD INSECURITY: WITHIN THE PAST 12 MONTHS, THE FOOD YOU BOUGHT JUST DIDN'T LAST AND YOU DIDN'T HAVE MONEY TO GET MORE.: NEVER TRUE

## 2025-04-25 SDOH — ECONOMIC STABILITY: FOOD INSECURITY: WITHIN THE PAST 12 MONTHS, YOU WORRIED THAT YOUR FOOD WOULD RUN OUT BEFORE YOU GOT MONEY TO BUY MORE.: NEVER TRUE

## 2025-04-28 ENCOUNTER — OFFICE VISIT (OUTPATIENT)
Dept: FAMILY MEDICINE CLINIC | Age: 60
End: 2025-04-28
Payer: COMMERCIAL

## 2025-04-28 VITALS
DIASTOLIC BLOOD PRESSURE: 84 MMHG | HEART RATE: 72 BPM | SYSTOLIC BLOOD PRESSURE: 128 MMHG | WEIGHT: 220 LBS | OXYGEN SATURATION: 97 % | BODY MASS INDEX: 30.8 KG/M2 | HEIGHT: 71 IN

## 2025-04-28 DIAGNOSIS — I10 ESSENTIAL HYPERTENSION: ICD-10-CM

## 2025-04-28 DIAGNOSIS — E66.9 OBESITY (BMI 30-39.9): ICD-10-CM

## 2025-04-28 DIAGNOSIS — E11.9 TYPE 2 DIABETES MELLITUS WITHOUT COMPLICATION, WITHOUT LONG-TERM CURRENT USE OF INSULIN (HCC): Primary | ICD-10-CM

## 2025-04-28 DIAGNOSIS — Z95.1 S/P CABG X 3: ICD-10-CM

## 2025-04-28 DIAGNOSIS — E78.1 HYPERTRIGLYCERIDEMIA: ICD-10-CM

## 2025-04-28 DIAGNOSIS — Z12.5 PROSTATE CANCER SCREENING: ICD-10-CM

## 2025-04-28 DIAGNOSIS — I25.118 CORONARY ARTERY DISEASE OF NATIVE ARTERY OF NATIVE HEART WITH STABLE ANGINA PECTORIS: ICD-10-CM

## 2025-04-28 LAB — HBA1C MFR BLD: 8 %

## 2025-04-28 PROCEDURE — G2211 COMPLEX E/M VISIT ADD ON: HCPCS | Performed by: FAMILY MEDICINE

## 2025-04-28 PROCEDURE — 83036 HEMOGLOBIN GLYCOSYLATED A1C: CPT | Performed by: FAMILY MEDICINE

## 2025-04-28 PROCEDURE — 99214 OFFICE O/P EST MOD 30 MIN: CPT | Performed by: FAMILY MEDICINE

## 2025-04-28 PROCEDURE — 3079F DIAST BP 80-89 MM HG: CPT | Performed by: FAMILY MEDICINE

## 2025-04-28 PROCEDURE — 3052F HG A1C>EQUAL 8.0%<EQUAL 9.0%: CPT | Performed by: FAMILY MEDICINE

## 2025-04-28 PROCEDURE — 3074F SYST BP LT 130 MM HG: CPT | Performed by: FAMILY MEDICINE

## 2025-04-28 RX ORDER — METFORMIN HYDROCHLORIDE 500 MG/1
1000 TABLET, EXTENDED RELEASE ORAL
Qty: 360 TABLET | Refills: 1 | Status: SHIPPED | OUTPATIENT
Start: 2025-04-28 | End: 2025-05-01

## 2025-04-28 NOTE — PROGRESS NOTES
APPLICATION OF PLATELET GEL, STERNAL PLATING performed by Debbie Mccoy MD at [facility]    LUMBAR DISC SURGERY  08/2004, 02/2007    L4-L5 (twice), [place], hardware in place     Family History   Problem Relation Age of Onset    Heart Attack Father 55    Diabetes Brother     Heart Attack Brother 50 - 59    Cancer Paternal Grandmother     Heart Attack Brother      Social History     Socioeconomic History    Marital status:      Spouse name: Not on file    Number of children: Not on file    Years of education: Not on file    Highest education level: Not on file   Occupational History    Not on file   Tobacco Use    Smoking status: Never    Smokeless tobacco: Never   Vaping Use    Vaping status: Never Used   Substance and Sexual Activity    Alcohol use: Not Currently    Drug use: No    Sexual activity: Yes     Partners: Female   Other Topics Concern    Not on file   Social History Narrative    Not on file     Social Drivers of Health     Financial Resource Strain: Low Risk  (8/15/2022)    Overall Financial Resource Strain (CARDIA)     Difficulty of Paying Living Expenses: Not hard at all   Food Insecurity: No Food Insecurity (4/25/2025)    Hunger Vital Sign     Worried About Running Out of Food in the Last Year: Never true     Ran Out of Food in the Last Year: Never true   Transportation Needs: No Transportation Needs (4/25/2025)    PRAPARE - Transportation     Lack of Transportation (Medical): No     Lack of Transportation (Non-Medical): No   Physical Activity: Not on file   Stress: Not on file   Social Connections: Not on file   Intimate Partner Violence: Not on file   Housing Stability: Low Risk  (4/25/2025)    Housing Stability Vital Sign     Unable to Pay for Housing in the Last Year: No     Number of Times Moved in the Last Year: 0     Homeless in the Last Year: No         ROS:  Gen:  Denies fever, chills, headaches.  HEENT:  Denies cold symptoms, sore throat.  CV:  Denies chest pain or tightness,

## 2025-05-01 DIAGNOSIS — E11.9 TYPE 2 DIABETES MELLITUS WITHOUT COMPLICATION, WITHOUT LONG-TERM CURRENT USE OF INSULIN (HCC): ICD-10-CM

## 2025-05-01 RX ORDER — METFORMIN HYDROCHLORIDE 500 MG/1
TABLET, EXTENDED RELEASE ORAL
Qty: 360 TABLET | Refills: 0 | Status: SHIPPED | OUTPATIENT
Start: 2025-05-01

## 2025-05-01 NOTE — TELEPHONE ENCOUNTER
Medication:   Requested Prescriptions     Pending Prescriptions Disp Refills    metFORMIN (GLUCOPHAGE-XR) 500 MG extended release tablet [Pharmacy Med Name: METFORMIN HCL  MG TABLET] 180 tablet 0     Sig: TAKE 2 TABLETS BY MOUTH DAILY WITH BREAFAST       Last Filled:  04/28/25 #360 1rf     Patient Phone Number: [phone] (home)     Last appt: 4/28/2025   Next appt: 7/31/2025    Last Labs DM:   Lab Results   Component Value Date/Time    LABA1C 8.0 04/28/2025 03:57 PM

## 2025-06-02 NOTE — PROGRESS NOTES
(uACR) test  02/20/2021    Colorectal Cancer Screen  01/29/2023    Flu vaccine (1) Never done    Diabetic foot exam  08/15/2023    Lipids  10/27/2023    DTaP/Tdap/Td vaccine (2 - Td or Tdap) 12/17/2023    Hepatitis B vaccine (1 of 3 - 3-dose series) 05/22/2024 (Originally 1965)    Shingles vaccine (1 of 2) 05/22/2024 (Originally 6/6/2014)    GFR test (Diabetes, CKD 3-4, OR last GFR 15-59)  11/27/2024    Depression Screen  01/10/2025    A1C test (Diabetic or Prediabetic)  01/11/2025    Hepatitis C screen  Completed    Hepatitis A vaccine  Aged Out    Hib vaccine  Aged Out    Polio vaccine  Aged Out    Meningococcal (ACWY) vaccine  Aged Out    HIV screen  Discontinued    Prostate Specific Antigen (PSA) Screening or Monitoring  Discontinued       ROS:    Skin: no changing moles, abnormal pigmentation, rash, scaling, itching, masses, hair or nail changes  Eyes: no change in vision  Ears/Nose/Throat: no hearing loss, tinnitus, vertigo, nosebleed, nasal congestion, rhinorrhea, sore throat  Respiratory: no cough or shortness of breath  Cardiovascular: no chest pain, BO, orthopnea, PND, palpitations, or claudication  Gastrointestinal: no nausea, vomiting, abdominal pain or change in stools.  No blood in stools.   Genitourinary: no urinary symptoms or incontinence. No erectile dysfunction.  Musculoskeletal: no arthritis, arthralgia, myalgia or weakness  Neurologic: no weakness/numbness, seizures, or headaches  Hematologic/Lymphatic/Immunologic: no abnormal bleeding/bruising, fever, chills, night sweats, swollen glands, or unexplained weight loss  Endocrine: no heat or cold intolerance and no polyphagia, polydipsia, or polyuria    OBJECTIVE:   /78   Pulse 73   Ht 1.803 m (5' 11\")   Wt 101.2 kg (223 lb)   SpO2 98%   BMI 31.10 kg/m²   General appearance: healthy, alert, no distress  Skin: Skin color, texture, turgor normal. No rashes or suspicious lesions.  No induration or tightening palpated.  Head: 
7 (severe pain)

## 2025-06-23 ENCOUNTER — HOSPITAL ENCOUNTER (OUTPATIENT)
Age: 60
Discharge: HOME | End: 2025-06-23
Payer: COMMERCIAL

## 2025-06-23 DIAGNOSIS — E11.9 TYPE 2 DIABETES MELLITUS WITHOUT COMPLICATION, WITHOUT LONG-TERM CURRENT USE OF INSULIN (HCC): ICD-10-CM

## 2025-06-23 DIAGNOSIS — E78.1 HYPERTRIGLYCERIDEMIA: ICD-10-CM

## 2025-06-23 DIAGNOSIS — N46.9 INFERTILITY MALE: ICD-10-CM

## 2025-06-23 DIAGNOSIS — Z12.5 PROSTATE CANCER SCREENING: ICD-10-CM

## 2025-06-23 DIAGNOSIS — E29.1 HYPOGONADISM MALE: ICD-10-CM

## 2025-06-23 DIAGNOSIS — E55.9 VITAMIN D DEFICIENCY: ICD-10-CM

## 2025-06-23 DIAGNOSIS — I10 ESSENTIAL HYPERTENSION: ICD-10-CM

## 2025-06-23 LAB
ALBUMIN SERPL-MCNC: 4.5 G/DL (ref 3.4–5)
ALBUMIN/GLOB SERPL: 2 {RATIO} (ref 1.1–2.2)
ALP SERPL-CCNC: 70 U/L (ref 40–129)
ALT SERPL-CCNC: 40 U/L (ref 10–40)
ANION GAP SERPL CALCULATED.3IONS-SCNC: 10 MMOL/L (ref 3–16)
AST SERPL-CCNC: 32 U/L (ref 15–37)
BASOPHILS # BLD: 0 K/UL (ref 0–0.2)
BASOPHILS NFR BLD: 0.9 %
BILIRUB SERPL-MCNC: 0.7 MG/DL (ref 0–1)
BUN SERPL-MCNC: 20 MG/DL (ref 7–20)
CALCIUM SERPL-MCNC: 9.5 MG/DL (ref 8.3–10.6)
CHLORIDE SERPL-SCNC: 108 MMOL/L (ref 99–110)
CHOLEST SERPL-MCNC: 168 MG/DL (ref 0–199)
CO2 SERPL-SCNC: 23 MMOL/L (ref 21–32)
CREAT SERPL-MCNC: 1 MG/DL (ref 0.8–1.3)
DEPRECATED RDW RBC AUTO: 14 % (ref 12.4–15.4)
EOSINOPHIL # BLD: 0.2 K/UL (ref 0–0.6)
EOSINOPHIL NFR BLD: 4.2 %
EST. AVERAGE GLUCOSE BLD GHB EST-MCNC: 145.6 MG/DL
GFR SERPLBLD CREATININE-BSD FMLA CKD-EPI: 86 ML/MIN/{1.73_M2}
GLUCOSE SERPL-MCNC: 156 MG/DL (ref 70–99)
HBA1C MFR BLD: 6.7 %
HCT VFR BLD AUTO: 42.4 % (ref 40.5–52.5)
HDLC SERPL-MCNC: 29 MG/DL (ref 40–60)
HGB BLD-MCNC: 14.3 G/DL (ref 13.5–17.5)
LDLC SERPL CALC-MCNC: ABNORMAL MG/DL
LDLC SERPL-MCNC: 89 MG/DL
LYMPHOCYTES # BLD: 1.1 K/UL (ref 1–5.1)
LYMPHOCYTES NFR BLD: 28.4 %
MCH RBC QN AUTO: 29.3 PG (ref 26–34)
MCHC RBC AUTO-ENTMCNC: 33.8 G/DL (ref 31–36)
MCV RBC AUTO: 86.7 FL (ref 80–100)
MONOCYTES # BLD: 0.4 K/UL (ref 0–1.3)
MONOCYTES NFR BLD: 11.6 %
NEUTROPHILS # BLD: 2.1 K/UL (ref 1.7–7.7)
NEUTROPHILS NFR BLD: 54.9 %
PLATELET # BLD AUTO: 149 K/UL (ref 135–450)
PMV BLD AUTO: 10.1 FL (ref 5–10.5)
POTASSIUM SERPL-SCNC: 4.5 MMOL/L (ref 3.5–5.1)
PROT SERPL-MCNC: 6.7 G/DL (ref 6.4–8.2)
PSA SERPL DL<=0.01 NG/ML-MCNC: 0.47 NG/ML (ref 0–4)
RBC # BLD AUTO: 4.89 M/UL (ref 4.2–5.9)
SODIUM SERPL-SCNC: 141 MMOL/L (ref 136–145)
TRIGL SERPL-MCNC: 301 MG/DL (ref 0–150)
TSH SERPL DL<=0.005 MIU/L-ACNC: 1.72 UIU/ML (ref 0.27–4.2)
VLDLC SERPL CALC-MCNC: ABNORMAL MG/DL
WBC # BLD AUTO: 3.8 K/UL (ref 4–11)

## 2025-06-23 PROCEDURE — 84153 ASSAY OF PSA TOTAL: CPT

## 2025-06-23 PROCEDURE — 80061 LIPID PANEL: CPT

## 2025-06-23 PROCEDURE — 80053 COMPREHEN METABOLIC PANEL: CPT

## 2025-06-23 PROCEDURE — 83036 HEMOGLOBIN GLYCOSYLATED A1C: CPT

## 2025-06-23 PROCEDURE — 82671 ASSAY OF ESTROGENS: CPT

## 2025-06-23 PROCEDURE — 84403 ASSAY OF TOTAL TESTOSTERONE: CPT

## 2025-06-23 PROCEDURE — 84270 ASSAY OF SEX HORMONE GLOBUL: CPT

## 2025-06-23 PROCEDURE — 84443 ASSAY THYROID STIM HORMONE: CPT

## 2025-06-23 PROCEDURE — 36415 COLL VENOUS BLD VENIPUNCTURE: CPT

## 2025-06-23 PROCEDURE — 85025 COMPLETE CBC W/AUTO DIFF WBC: CPT

## 2025-06-24 ENCOUNTER — RESULTS FOLLOW-UP (OUTPATIENT)
Dept: FAMILY MEDICINE CLINIC | Age: 60
End: 2025-06-24

## 2025-06-26 ENCOUNTER — OFFICE VISIT (OUTPATIENT)
Dept: ENDOCRINOLOGY | Age: 60
End: 2025-06-26
Payer: COMMERCIAL

## 2025-06-26 VITALS
BODY MASS INDEX: 31.3 KG/M2 | HEIGHT: 71 IN | OXYGEN SATURATION: 96 % | DIASTOLIC BLOOD PRESSURE: 87 MMHG | WEIGHT: 223.6 LBS | SYSTOLIC BLOOD PRESSURE: 127 MMHG | HEART RATE: 90 BPM

## 2025-06-26 DIAGNOSIS — N46.9 INFERTILITY MALE: ICD-10-CM

## 2025-06-26 DIAGNOSIS — E29.1 HYPOGONADISM MALE: Primary | ICD-10-CM

## 2025-06-26 DIAGNOSIS — F52.4 PREMATURE EJACULATION: ICD-10-CM

## 2025-06-26 DIAGNOSIS — N52.9 ERECTILE DYSFUNCTION, UNSPECIFIED ERECTILE DYSFUNCTION TYPE: ICD-10-CM

## 2025-06-26 DIAGNOSIS — E55.9 VITAMIN D DEFICIENCY: ICD-10-CM

## 2025-06-26 LAB
ESTRADIOL SERPL HS-MCNC: 11.9 PG/ML (ref 10–42)
ESTROGEN SERPL CALC-MCNC: 31 PG/ML (ref 19–69)
ESTRONE SERPL-MCNC: 19.1 PG/ML (ref 9–36)
SHBG SERPL-SCNC: 116 NMOL/L (ref 19–76)
TESTOST FREE SERPL-MCNC: 80.8 PG/ML (ref 47–244)
TESTOST SERPL-MCNC: 921 NG/DL (ref 193–740)

## 2025-06-26 PROCEDURE — 3079F DIAST BP 80-89 MM HG: CPT | Performed by: INTERNAL MEDICINE

## 2025-06-26 PROCEDURE — 3074F SYST BP LT 130 MM HG: CPT | Performed by: INTERNAL MEDICINE

## 2025-06-26 PROCEDURE — 99214 OFFICE O/P EST MOD 30 MIN: CPT | Performed by: INTERNAL MEDICINE

## 2025-06-26 RX ORDER — TESTOSTERONE 1.62 MG/G
4 GEL TRANSDERMAL DAILY
Qty: 2 EACH | Refills: 4 | Status: SHIPPED | OUTPATIENT
Start: 2025-06-26 | End: 2025-12-23

## 2025-06-26 NOTE — PROGRESS NOTES
Patient ID:   Arash Valdes is a 60 y.o. male    Chief Complaint:   Arash Valdes is seen for an evaluation of  hypogonadism.     Subjective:   Arash Valdes had MRI brain showing pituitary tumor in Oct 2021. There is a nonenhancing cystic lesion within the sella extending into the suprasellar cistern measuring approximately 11 x 9 x 14 mm (AP x TRANS x CC).  This appears to contact the optic chiasm without definitive elevation. 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      MRI also showed somewhat lobulated T2 hyperintense lesion within the left parietal lobe with associated susceptibility.  This is most compatible with a cavernoma.  There is no surrounding edema.      He saw Dr. Uriarte. He is been told to have small aneurysm.   VF normal - Dec 2021      TT 21, LH and FSH low normal - Oct 2021   IGF slightly low at 44, ACTH, prolactin, Am cortisol, TSH, T4, Free T3 - Oct 2021     Denies head traumas   Fathered two children, doing well   Does not smoke     Denies drug abuse, opioids, steroids      DM, HTN and metabolic diseases as per pcp   Clomid failed     Interval history:      Heart disease found on calcium score.   Had open heart surgery, had three vessel disease  - March 2024     Just came back from [place], [place]     Taking Androgel 1.6% , 4 actuations daily . Wife puts the cream on him. She takes precautions to avoid exposure.    Taking every day and does all precautions to avoid exposure to other      Headaches gone since CABG     Libido is good, 7/10    Performance is 6-7/10, not back to normal. He still has premature ejaculation, he feels its way too quick.   Energy levels are low recently. Done with rehab. Not doing any exercise    Has some morning erections, stable    No issues in concentration    Sleeping well , goes to rest room 2-3 times per night . Drinking 10 of 16 ounce bottles   Durand is not growing as fast   Denies muscle weakness.    Denies depression or anxiety   Denies nipple

## 2025-06-27 ENCOUNTER — TELEPHONE (OUTPATIENT)
Dept: ENDOCRINOLOGY | Age: 60
End: 2025-06-27

## 2025-06-27 NOTE — TELEPHONE ENCOUNTER
Submitted PA for Testosterone Gel  Via [facility] Key: [id number] STATUS: PENDING.    Follow up done daily; if no decision with in three days we will refax.  If another three days goes by with no decision will call the insurance for status.

## 2025-07-01 NOTE — TELEPHONE ENCOUNTER
Approved on June 30 by [employer] 2017  PA Case: [id number], Status: Approved, Coverage Starts on: 6/27/2025 12:00 AM, Coverage Ends on: 6/27/2026 12:00 AM. Questions? Contact [phone].  Effective Date: 6/27/2025  Authorization Expiration Date: 6/27/2026    If this requires a response please respond to the pool ( P MHCX PSC MEDICATION PRE-AUTH).      Thank you please advise patient.

## 2025-07-08 DIAGNOSIS — Z95.1 S/P CABG X 3: ICD-10-CM

## 2025-07-08 RX ORDER — METOPROLOL SUCCINATE 25 MG/1
12.5 TABLET, EXTENDED RELEASE ORAL DAILY
Qty: 45 TABLET | Refills: 3 | Status: SHIPPED | OUTPATIENT
Start: 2025-07-08

## 2025-07-08 NOTE — TELEPHONE ENCOUNTER
Last OV: 11/22/24 DCE  Next OV: 11/14/25 DCE  Last Labs: 06/23/25  Last Fill:   metoprolol succinate (TOPROL XL) 25 MG extended release tablet 90 tablet 3 5/17/2024 --    Sig - Route: Take 0.5 tablets by mouth daily - Oral    Sent to pharmacy as: Metoprolol Succinate ER 25 MG Oral Tablet Extended Release 24 Hour (TOPROL XL)    Cosign for Ordering: Accepted by Jessee Terry MD on 5/17/2024  3:37 PM    E-Prescribing Status: Receipt confirmed by pharmacy (5/17/2024  3:18 PM EDT)

## 2025-07-10 DIAGNOSIS — Z95.1 S/P CABG X 3: ICD-10-CM

## 2025-07-10 RX ORDER — METOPROLOL SUCCINATE 25 MG/1
12.5 TABLET, EXTENDED RELEASE ORAL DAILY
Qty: 45 TABLET | Refills: 3 | OUTPATIENT
Start: 2025-07-10

## 2025-07-16 DIAGNOSIS — E11.9 TYPE 2 DIABETES MELLITUS WITHOUT COMPLICATION, WITHOUT LONG-TERM CURRENT USE OF INSULIN (HCC): ICD-10-CM

## 2025-07-16 RX ORDER — DAPAGLIFLOZIN 10 MG/1
10 TABLET, FILM COATED ORAL EVERY MORNING
Qty: 30 TABLET | Refills: 0 | Status: SHIPPED | OUTPATIENT
Start: 2025-07-16

## 2025-07-16 NOTE — TELEPHONE ENCOUNTER
This script was written for 30 days and insurance will not cover it for less than 90. Please resubmit for 90.

## 2025-07-16 NOTE — TELEPHONE ENCOUNTER
Medication:   Requested Prescriptions     Pending Prescriptions Disp Refills    FARXIGA 10 MG tablet [Pharmacy Med Name: FARXIGA 10 MG TABLET] 90 tablet 3     Sig: TAKE 1 TABLET BY MOUTH EVERY MORNING        Last Filled:  07/10/2024 #90 3rf     Patient Phone Number: [phone] (home)     Last appt: 4/28/2025   Next appt: 7/31/2025    Last OARRS:       7/28/2022     4:29 PM   RX Monitoring   Periodic Controlled Substance Monitoring No signs of potential drug abuse or diversion identified.

## 2025-07-21 ENCOUNTER — PATIENT MESSAGE (OUTPATIENT)
Dept: FAMILY MEDICINE CLINIC | Age: 60
End: 2025-07-21

## 2025-07-21 ENCOUNTER — TELEPHONE (OUTPATIENT)
Dept: FAMILY MEDICINE CLINIC | Age: 60
End: 2025-07-21

## 2025-07-21 DIAGNOSIS — E11.9 TYPE 2 DIABETES MELLITUS WITHOUT COMPLICATION, WITHOUT LONG-TERM CURRENT USE OF INSULIN (HCC): ICD-10-CM

## 2025-07-21 RX ORDER — DAPAGLIFLOZIN 10 MG/1
10 TABLET, FILM COATED ORAL EVERY MORNING
Qty: 90 TABLET | Refills: 0 | Status: SHIPPED | OUTPATIENT
Start: 2025-07-21

## 2025-07-21 NOTE — TELEPHONE ENCOUNTER
Medication:   Requested Prescriptions     Pending Prescriptions Disp Refills    dapagliflozin (FARXIGA) 10 MG tablet 90 tablet 0     Sig: Take 1 tablet by mouth every morning        Last Filled:  07/16/2025 #30 0rf: Requesting #90     Patient Phone Number: [phone] (home)     Last appt: 4/28/2025   Next appt: 7/31/2025    Last OARRS:       7/28/2022     4:29 PM   RX Monitoring   Periodic Controlled Substance Monitoring No signs of potential drug abuse or diversion identified.

## 2025-07-21 NOTE — TELEPHONE ENCOUNTER
Pharmacy called requesting clarity on script....  dapagliflozin (FARXIGA) 10 MG tablet [[id number]]    Order Details  Dose: 10 mg Route: Oral Frequency: EVERY MORNING   Dispense Quantity: 30 tablet Refills: 0          Sig: TAKE 1 TABLET BY MOUTH EVERY MORNING         Stated that...Script is for 30 day supply but insurance will only pay for a 90 day supply. Needs new script sent in for 90 days      Please advise.     Pharmacy...  [facility] [id number] - [address] - P [phone] - F [phone]

## 2025-07-31 ENCOUNTER — OFFICE VISIT (OUTPATIENT)
Dept: FAMILY MEDICINE CLINIC | Age: 60
End: 2025-07-31
Payer: COMMERCIAL

## 2025-07-31 VITALS
DIASTOLIC BLOOD PRESSURE: 78 MMHG | BODY MASS INDEX: 30.8 KG/M2 | HEIGHT: 71 IN | SYSTOLIC BLOOD PRESSURE: 124 MMHG | HEART RATE: 84 BPM | OXYGEN SATURATION: 98 % | WEIGHT: 220 LBS

## 2025-07-31 DIAGNOSIS — Z00.00 ANNUAL PHYSICAL EXAM: Primary | ICD-10-CM

## 2025-07-31 DIAGNOSIS — I10 ESSENTIAL HYPERTENSION: ICD-10-CM

## 2025-07-31 DIAGNOSIS — E78.1 HYPERTRIGLYCERIDEMIA: ICD-10-CM

## 2025-07-31 DIAGNOSIS — E29.1 HYPOGONADISM MALE: ICD-10-CM

## 2025-07-31 DIAGNOSIS — Z95.1 S/P CABG X 3: ICD-10-CM

## 2025-07-31 DIAGNOSIS — E11.9 TYPE 2 DIABETES MELLITUS WITHOUT COMPLICATION, WITHOUT LONG-TERM CURRENT USE OF INSULIN (HCC): ICD-10-CM

## 2025-07-31 DIAGNOSIS — L98.9 SKIN LESION: ICD-10-CM

## 2025-07-31 PROCEDURE — 3074F SYST BP LT 130 MM HG: CPT | Performed by: FAMILY MEDICINE

## 2025-07-31 PROCEDURE — 3078F DIAST BP <80 MM HG: CPT | Performed by: FAMILY MEDICINE

## 2025-07-31 PROCEDURE — 99396 PREV VISIT EST AGE 40-64: CPT | Performed by: FAMILY MEDICINE

## 2025-07-31 NOTE — PROGRESS NOTES
SUBJECTIVE:   Arash Valdes is a 60 y.o. male presenting for his annual checkup.      HPI:   has a few skin lesions on legs he has noticed. 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    Hypogonadism male    Infertility male    Benign neoplasm of spinal cord (HCC)    Erectile dysfunction    Coronary artery disease    S/P CABG x 3       Past Medical History:   Diagnosis Date    Chronic intractable headache 02/14/2022    HTN (hypertension)     Lumbar disc herniation 2004, 2007    open worker's comp claim    Other and unspecified hyperlipidemia     Type II or unspecified type diabetes mellitus without mention of complication, not stated as uncontrolled 11/2013       Past Surgical History:   Procedure Laterality Date    COLONOSCOPY      CORONARY ARTERY BYPASS GRAFT N/A 03/20/2024    CABG X 3  with LIMA and SVG, TCPB, ROSA, VERIFICATION OF BYPASS GRAFTS FLOW WITH MEDISTEM, EVH OF _RIGHT SAPHENOUS VEIN, APPLICATION OF PLATELET GEL, STERNAL PLATING performed by Debbie Mccoy MD at [facility]    LUMBAR DISC SURGERY  08/2004, 02/2007    L4-L5 (twice), [place], hardware in place       Social History     Socioeconomic History    Marital status:    Tobacco Use    Smoking status: Never    Smokeless tobacco: Never   Vaping Use    Vaping status: Never Used   Substance and Sexual Activity    Alcohol use: Not Currently    Drug use: No    Sexual activity: Yes     Partners: Female     Social Drivers of Health     Financial Resource Strain: Low Risk  (8/15/2022)    Overall Financial Resource Strain (CARDIA)     Difficulty of Paying Living Expenses: Not

## (undated) DEVICE — 3M™ STERI-DRAPE™ INSTRUMENT POUCH 1018: Brand: STERI-DRAPE™

## (undated) DEVICE — 3 ML SYRINGE LUER-LOCK TIP: Brand: MONOJECT

## (undated) DEVICE — Device

## (undated) DEVICE — SYRINGE, LUER LOCK, 5ML: Brand: MEDLINE

## (undated) DEVICE — DRAIN SURG SGL COLL PT TB FOR ATS BG OASIS

## (undated) DEVICE — BASIC SINGLE BASIN 1-LF: Brand: MEDLINE INDUSTRIES, INC.

## (undated) DEVICE — GAUZE,SPONGE,4"X4",8PLY,STRL,LF,10/TRAY: Brand: MEDLINE

## (undated) DEVICE — DRAIN SURG 19FR SIL RND HUBLESS RADPQ W/O TRCR BLAK

## (undated) DEVICE — BLOWER COR ART L16.5CM PLAS SHFT MAL W/ MIST IV SET AXIUS

## (undated) DEVICE — CANNULA PERF L125IN OD15FR POLYVI CHL VEN RG AUTO INFL SMOOT

## (undated) DEVICE — APPLICATOR PREP 26ML 0.7% IOD POVACRYLEX 74% ISO ALC ST

## (undated) DEVICE — CONNECTOR PERF W0.25XH3/8IN BASE Y SHP REDUC W/O LUERLOCK

## (undated) DEVICE — DRAIN SURG 24FR BLAK SIL HUBLESS 4 CHANNELED RADPQ EXTN TB

## (undated) DEVICE — BANDAGE COMPR W6INXL10YD ST M E WHITE/BEIGE

## (undated) DEVICE — DRAPE THER FLUID WARMING 66X44 IN FLAT SLUSH DBL DISC ORS

## (undated) DEVICE — BLANKET WRM CARD FOR MISTRAL AIR WRM SYS

## (undated) DEVICE — SET PERF L15IN BLU CLMP MULT FEM LUER ON SGL INLET LEG DLP

## (undated) DEVICE — TIP SUCT FRAZ 8FR ST W VENT

## (undated) DEVICE — ELECTRODE PT RET AD L9FT HI MOIST COND ADH HYDRGEL CORDED

## (undated) DEVICE — SUTURE NONABSORBABLE MONOFILAMENT 4-0 RB-1 36 IN BLU PROLENE 8557H

## (undated) DEVICE — ROTATING SURGICAL PUNCHES, 1 PER POUCH: Brand: A&E MEDICAL / ROTATING SURGICAL PUNCHES

## (undated) DEVICE — STERNUM BLADE, OFFSET (31.7 X 0.64 X 6.3MM)

## (undated) DEVICE — EVERGRIP INSERT SET 61MM: Brand: FOGARTY EVERGRIP

## (undated) DEVICE — LIQUIBAND RAPID ADHESIVE 36/CS 0.8ML: Brand: MEDLINE

## (undated) DEVICE — SET TRNQT W/ STD 7IN 12FR 5 TB 1 SNR DLP

## (undated) DEVICE — SUTURE VCRL SZ 4-0 L18IN ABSRB UD L19MM PS-2 3/8 CIR PRIM J496H

## (undated) DEVICE — TUBING, SUCTION, 1/4" X 10', STRAIGHT: Brand: MEDLINE

## (undated) DEVICE — BLOOD TRANSFUSION FILTER: Brand: HAEMONETICS

## (undated) DEVICE — ADAPTER PERF L7.5IN INLET LEG 3IN Y TYP VENT CLR CODE CLMP

## (undated) DEVICE — RESERVOIR AUTOTRANSFUSION 225/120 CC GS FILTERED XTRA

## (undated) DEVICE — SUTURE PROL SZ 3-0 L36IN NONABSORBABLE BLU L26MM SH 1/2 CIR 8522H

## (undated) DEVICE — COVER,MAYO STAND,XL,STERILE: Brand: MEDLINE

## (undated) DEVICE — BAG BLD TRNSF 1 CPLR IV ST 600ML TERUFLEX

## (undated) DEVICE — TTL1LYR 16FR10ML 100%SIL TMPST TR: Brand: MEDLINE

## (undated) DEVICE — KIT ANGEL PRP

## (undated) DEVICE — SET ADMIN PRIMING 67ML L105IN NVENT 180UM FLTR 3 RLER CLMP

## (undated) DEVICE — APPLICATOR MEDICATED 3 CC SOLUTION CLR STRL CHLORAPREP 930400

## (undated) DEVICE — DRESSING GERM DIA1IN CNTR H DIA7MM BLU CHG ANTIMIC PROTCT

## (undated) DEVICE — Z DISC USE 2220190 SUTURE VCRL SZ 3-0 L27IN ABSRB UD L26MM SH 1/2 CIR J416H

## (undated) DEVICE — DRESSING WND SM W2.5XL4IN BRTH W/ CATH SECUREMENT AND

## (undated) DEVICE — Z DISCONTINUED USE 2881860 SUTURE VCRL + SZ 4-0 L18IN ABSRB UD L19MM PS-2 3/8 CIR PRIM VCP496H

## (undated) DEVICE — SYRINGE ONLY,20ML LUER LOCK: Brand: MEDLINE INDUSTRIES, INC.

## (undated) DEVICE — ACUMEN IQ SENSOR: Brand: ACUMEN IQ

## (undated) DEVICE — PAD THRM M SPL TORSO

## (undated) DEVICE — TRAY VEN ACCS DIA9FR 3 LUMN ADV HF DEV

## (undated) DEVICE — CONTAINER STOR SURG SLUSH

## (undated) DEVICE — HYPODERMIC SAFETY NEEDLE: Brand: MAGELLAN

## (undated) DEVICE — OPTIFOAM GENTLE LIQUITRAP, SACRUM, 7"X7": Brand: MEDLINE

## (undated) DEVICE — BOWL MED L 32OZ PLAS W/ MOLD GRAD EZ OPN PEEL PCH

## (undated) DEVICE — LABEL MED CUST CVR

## (undated) DEVICE — SUTURE PROL SZ 3-0 L36IN NONABSORBABLE BLU L17MM RB-1 1/2 8558H

## (undated) DEVICE — DECANTER FLD 9IN ST BG FOR ASEP TRNSF OF FLD

## (undated) DEVICE — CANNULA PERFUSION 5.5IN 9FR AORTIC ROOT

## (undated) DEVICE — TEMP PACING WIRE: Brand: MYO/WIRE

## (undated) DEVICE — Device: Brand: MEDEX

## (undated) DEVICE — NEEDLE HYPO 30GA L1IN BGE POLYPR HUB S STL REG BVL STR W/O

## (undated) DEVICE — SUTURE PROL SZ 7-0 L24IN NONABSORBABLE BLU L8MM BV175-6 3/8 8735H

## (undated) DEVICE — SUTURE PROL SZ 4-0 L36IN NONABSORBABLE BLU L26MM SH 1/2 CIR 8521H

## (undated) DEVICE — SUTURE VCRL + SZ 2-0 L36IN ABSRB UD L36MM CT-1 1/2 CIR VCP945H

## (undated) DEVICE — OPEN HRT BASIC PK

## (undated) DEVICE — SUTURE VCRL + SZ 0 L27IN ABSRB UD CT-1 L36MM 1/2 CIR TAPR VCP260H

## (undated) DEVICE — MERCY FAIRFIELD TURNOVER KIT: Brand: MEDLINE INDUSTRIES, INC.

## (undated) DEVICE — KIT COMPL CK0289R4  BB9L99R8

## (undated) DEVICE — GLOVE SURG SZ 65 THK91MIL LTX FREE SYN POLYISOPRENE

## (undated) DEVICE — SYRINGE, LUER LOCK, 10ML: Brand: MEDLINE

## (undated) DEVICE — Device: Brand: ZDRIVE™

## (undated) DEVICE — SUTURE PERMAHAND SZ 0 L30IN NONABSORBABLE BLK L26MM SH 1/2 K834H

## (undated) DEVICE — FAIRFIELD CABG ACCESSARY PK

## (undated) DEVICE — PRESSURE MONITORING SET: Brand: TRUWAVE, VAMP PLUS

## (undated) DEVICE — EVERGRIP INSERT SET 86MM: Brand: FOGARTY EVERGRIP

## (undated) DEVICE — CANNULA ART 21FR L145IN VENT CONN SFT FLOW EXT

## (undated) DEVICE — KIT PROC 2 SPRY APPL 11:1

## (undated) DEVICE — SUTURE NONABSORBABLE MONOFILAMENT 5-0 C-1 1X24 IN PROLENE 8725H

## (undated) DEVICE — DRESSING CATHETER POS W BTRFLY TAPE PTCH ULT I SYS SORBAVIEW

## (undated) DEVICE — DUAL STAGE VENOUS RETURN CANNULA: Brand: THIN-FLEX DUAL STAGE VENOUS DRAINAGE CANNULA

## (undated) DEVICE — GOWN SIRUS NONREIN XL W/TWL: Brand: MEDLINE INDUSTRIES, INC.

## (undated) DEVICE — SUTURE NONABSORBABLE MONOFILAMENT 6-0 C-1 1X30 IN PROLENE 8706H

## (undated) DEVICE — EZE-BAND LF ST 4X5.5 36/CS: Brand: EZE-BAND LF ST 4X5.5 36/CS

## (undated) DEVICE — DRESSING NEG PRSS 20CM PREVENA

## (undated) DEVICE — AORTIC PNCH 4.0MM 8IN BX 10 DISP

## (undated) DEVICE — SUTURE PERMAHAND SZ 2-0 L30IN NONABSORBABLE BLK SILK W/O A305H

## (undated) DEVICE — SYSTEM PROX SEAL DEL DEV AND LOADER HEARTSTRING II

## (undated) DEVICE — SUTURE PERMA-HAND SZ 4-0 L144IN NONABSORBABLE BLK LIGAPAK LA53G

## (undated) DEVICE — KIT ART LN 20GA L12CM FEP RADPQ 0.025X13.75IN SPR GWIRE

## (undated) DEVICE — BLADE ES L6IN ELASTOMERIC COAT EXT DURABLE BEND UPTO 90DEG

## (undated) DEVICE — PAD, DEFIB, ADULT, RADIOTRANS, PHYSIO: Brand: MEDLINE

## (undated) DEVICE — SUTURE NONABSORBABLE MONOFILAMENT 7-0 BV-1 1X24 IN PROLENE 8702H

## (undated) DEVICE — SYSTEM ENDOSCP VES HARV W/ TOOL CANN SEAL SHT PRT BLNT TIP

## (undated) DEVICE — [HIGH FLOW INSUFFLATOR,  DO NOT USE IF PACKAGE IS DAMAGED,  KEEP DRY,  KEEP AWAY FROM SUNLIGHT,  PROTECT FROM HEAT AND RADIOACTIVE SOURCES.]: Brand: PNEUMOSURE